# Patient Record
Sex: FEMALE | Race: WHITE | Employment: OTHER | URBAN - NONMETROPOLITAN AREA
[De-identification: names, ages, dates, MRNs, and addresses within clinical notes are randomized per-mention and may not be internally consistent; named-entity substitution may affect disease eponyms.]

---

## 2017-11-07 ENCOUNTER — HOSPITAL ENCOUNTER (OUTPATIENT)
Age: 67
Discharge: HOME | End: 2017-11-07
Payer: MEDICARE

## 2017-11-07 LAB
ALBUMIN SERPL-MCNC: 4.2 G/DL (ref 3.5–5.1)
ALP BLD-CCNC: 64 U/L (ref 38–126)
ALT SERPL-CCNC: 29 U/L (ref 11–66)
ANION GAP SERPL CALCULATED.3IONS-SCNC: 13 MEQ/L (ref 8–16)
AST SERPL-CCNC: 21 U/L (ref 5–40)
BILIRUB SERPL-MCNC: 0.9 MG/DL (ref 0.3–1.2)
BILIRUBIN DIRECT: < 0.2 MG/DL (ref 0–0.3)
BUN BLDV-MCNC: 21 MG/DL (ref 7–22)
CALCIUM SERPL-MCNC: 10.3 MG/DL (ref 8.5–10.5)
CHLORIDE BLD-SCNC: 101 MEQ/L (ref 98–111)
CHOLESTEROL, TOTAL: 203 MG/DL (ref 100–199)
CO2: 28 MEQ/L (ref 23–33)
CREAT SERPL-MCNC: 0.7 MG/DL (ref 0.4–1.2)
GFR SERPL CREATININE-BSD FRML MDRD: 83 ML/MIN/1.73M2
GLUCOSE BLD-MCNC: 119 MG/DL (ref 70–108)
HDLC SERPL-MCNC: 52 MG/DL
LDL CHOLESTEROL CALCULATED: 119 MG/DL
POTASSIUM SERPL-SCNC: 4.5 MEQ/L (ref 3.5–5.2)
SODIUM BLD-SCNC: 142 MEQ/L (ref 135–145)
TOTAL PROTEIN: 7 G/DL (ref 6.1–8)
TRIGL SERPL-MCNC: 159 MG/DL (ref 0–199)
TSH SERPL DL<=0.05 MIU/L-ACNC: 2.21 UIU/ML (ref 0.4–4.2)

## 2017-11-07 PROCEDURE — 80061 LIPID PANEL: CPT

## 2017-11-07 PROCEDURE — 36415 COLL VENOUS BLD VENIPUNCTURE: CPT

## 2017-11-07 PROCEDURE — 82248 BILIRUBIN DIRECT: CPT

## 2017-11-07 PROCEDURE — 80053 COMPREHEN METABOLIC PANEL: CPT

## 2017-11-07 PROCEDURE — 84443 ASSAY THYROID STIM HORMONE: CPT

## 2017-12-01 ENCOUNTER — HOSPITAL ENCOUNTER (OUTPATIENT)
Dept: PULMONOLOGY | Age: 67
Discharge: HOME | End: 2017-12-01
Payer: MEDICARE

## 2017-12-01 ENCOUNTER — HOSPITAL ENCOUNTER (OUTPATIENT)
Dept: GENERAL RADIOLOGY | Age: 67
Discharge: HOME | End: 2017-12-01
Payer: MEDICARE

## 2017-12-01 ENCOUNTER — HOSPITAL ENCOUNTER (OUTPATIENT)
Age: 67
Discharge: HOME | End: 2017-12-01
Payer: COMMERCIAL

## 2017-12-01 DIAGNOSIS — R06.02 BREATH SHORTNESS: ICD-10-CM

## 2017-12-01 PROCEDURE — 71020 XR CHEST STANDARD TWO VW: CPT

## 2017-12-01 PROCEDURE — 94060 EVALUATION OF WHEEZING: CPT

## 2017-12-01 PROCEDURE — 94726 PLETHYSMOGRAPHY LUNG VOLUMES: CPT

## 2017-12-01 PROCEDURE — 94729 DIFFUSING CAPACITY: CPT

## 2017-12-08 ENCOUNTER — HOSPITAL ENCOUNTER (OUTPATIENT)
Dept: INPATIENT UNIT | Age: 67
Setting detail: OBSERVATION
Discharge: HOME | End: 2017-12-09
Attending: INTERNAL MEDICINE | Admitting: INTERNAL MEDICINE
Payer: MEDICARE

## 2017-12-08 PROBLEM — I25.10 CAD IN NATIVE ARTERY: Status: ACTIVE | Noted: 2017-12-08

## 2017-12-08 LAB
ABO: NORMAL
ALBUMIN SERPL-MCNC: 4.3 G/DL (ref 3.5–5.1)
ALP BLD-CCNC: 54 U/L (ref 38–126)
ALT SERPL-CCNC: 26 U/L (ref 11–66)
ANION GAP SERPL CALCULATED.3IONS-SCNC: 14 MEQ/L (ref 8–16)
ANTIBODY SCREEN: NORMAL
AST SERPL-CCNC: 23 U/L (ref 5–40)
BILIRUB SERPL-MCNC: 0.9 MG/DL (ref 0.3–1.2)
BUN BLDV-MCNC: 24 MG/DL (ref 7–22)
CALCIUM SERPL-MCNC: 10.3 MG/DL (ref 8.5–10.5)
CHLORIDE BLD-SCNC: 99 MEQ/L (ref 98–111)
CHOLESTEROL, TOTAL: 193 MG/DL (ref 100–199)
CO2: 28 MEQ/L (ref 23–33)
CREAT SERPL-MCNC: 0.7 MG/DL (ref 0.4–1.2)
EKG ATRIAL RATE: 86 BPM
EKG P AXIS: 53 DEGREES
EKG P-R INTERVAL: 186 MS
EKG Q-T INTERVAL: 372 MS
EKG QRS DURATION: 72 MS
EKG QTC CALCULATION (BAZETT): 445 MS
EKG R AXIS: 25 DEGREES
EKG T AXIS: 42 DEGREES
EKG VENTRICULAR RATE: 86 BPM
GFR SERPL CREATININE-BSD FRML MDRD: 83 ML/MIN/1.73M2
GLUCOSE BLD-MCNC: 105 MG/DL (ref 70–108)
HCT VFR BLD CALC: 45.6 % (ref 37–47)
HDLC SERPL-MCNC: 57 MG/DL
HEMOGLOBIN: 15.6 GM/DL (ref 12–16)
INR BLD: 1.08 (ref 0.85–1.13)
LDL CHOLESTEROL CALCULATED: 102 MG/DL
MCH RBC QN AUTO: 30.9 PG (ref 27–31)
MCHC RBC AUTO-ENTMCNC: 34.3 GM/DL (ref 33–37)
MCV RBC AUTO: 90 FL (ref 81–99)
PDW BLD-RTO: 13.5 % (ref 11.5–14.5)
PLATELET # BLD: 231 THOU/MM3 (ref 130–400)
PMV BLD AUTO: 8.3 MCM (ref 7.4–10.4)
POTASSIUM SERPL-SCNC: 4 MEQ/L (ref 3.5–5.2)
RBC # BLD: 5.06 MILL/MM3 (ref 4.2–5.4)
RH FACTOR: NORMAL
SODIUM BLD-SCNC: 141 MEQ/L (ref 135–145)
TOTAL PROTEIN: 7.2 G/DL (ref 6.1–8)
TRIGL SERPL-MCNC: 171 MG/DL (ref 0–199)
WBC # BLD: 6.3 THOU/MM3 (ref 4.8–10.8)

## 2017-12-08 PROCEDURE — 93005 ELECTROCARDIOGRAM TRACING: CPT

## 2017-12-08 PROCEDURE — 36415 COLL VENOUS BLD VENIPUNCTURE: CPT

## 2017-12-08 PROCEDURE — 6370000000 HC RX 637 (ALT 250 FOR IP): Performed by: INTERNAL MEDICINE

## 2017-12-08 PROCEDURE — G0378 HOSPITAL OBSERVATION PER HR: HCPCS

## 2017-12-08 PROCEDURE — 85027 COMPLETE CBC AUTOMATED: CPT

## 2017-12-08 PROCEDURE — 86901 BLOOD TYPING SEROLOGIC RH(D): CPT

## 2017-12-08 PROCEDURE — 85610 PROTHROMBIN TIME: CPT

## 2017-12-08 PROCEDURE — 80053 COMPREHEN METABOLIC PANEL: CPT

## 2017-12-08 PROCEDURE — 2580000003 HC RX 258: Performed by: INTERNAL MEDICINE

## 2017-12-08 PROCEDURE — 86850 RBC ANTIBODY SCREEN: CPT

## 2017-12-08 PROCEDURE — 86900 BLOOD TYPING SEROLOGIC ABO: CPT

## 2017-12-08 PROCEDURE — 80061 LIPID PANEL: CPT

## 2017-12-08 RX ORDER — PRAVASTATIN SODIUM 80 MG/1
80 TABLET ORAL NIGHTLY
Status: DISCONTINUED | OUTPATIENT
Start: 2017-12-08 | End: 2017-12-09 | Stop reason: HOSPADM

## 2017-12-08 RX ORDER — MELOXICAM 7.5 MG/1
15 TABLET ORAL DAILY
Status: DISCONTINUED | OUTPATIENT
Start: 2017-12-09 | End: 2017-12-09 | Stop reason: HOSPADM

## 2017-12-08 RX ORDER — HYDROCHLOROTHIAZIDE 25 MG/1
25 TABLET ORAL DAILY
COMMUNITY
End: 2022-06-16 | Stop reason: ALTCHOICE

## 2017-12-08 RX ORDER — RAMIPRIL 10 MG/1
10 CAPSULE ORAL DAILY
Status: DISCONTINUED | OUTPATIENT
Start: 2017-12-09 | End: 2017-12-09 | Stop reason: HOSPADM

## 2017-12-08 RX ORDER — SODIUM CHLORIDE 9 MG/ML
INJECTION, SOLUTION INTRAVENOUS CONTINUOUS
Status: DISCONTINUED | OUTPATIENT
Start: 2017-12-08 | End: 2017-12-08 | Stop reason: SDUPTHER

## 2017-12-08 RX ORDER — ALENDRONATE SODIUM 35 MG/1
35 TABLET ORAL
Status: DISCONTINUED | OUTPATIENT
Start: 2017-12-08 | End: 2017-12-08 | Stop reason: SDUPTHER

## 2017-12-08 RX ORDER — ASPIRIN 325 MG
325 TABLET ORAL ONCE
Status: DISCONTINUED | OUTPATIENT
Start: 2017-12-08 | End: 2017-12-09 | Stop reason: HOSPADM

## 2017-12-08 RX ORDER — SODIUM CHLORIDE 0.9 % (FLUSH) 0.9 %
10 SYRINGE (ML) INJECTION PRN
Status: DISCONTINUED | OUTPATIENT
Start: 2017-12-08 | End: 2017-12-09

## 2017-12-08 RX ORDER — ASPIRIN 81 MG/1
81 TABLET ORAL DAILY
Status: DISCONTINUED | OUTPATIENT
Start: 2017-12-09 | End: 2017-12-09 | Stop reason: HOSPADM

## 2017-12-08 RX ORDER — METOPROLOL TARTRATE 50 MG/1
50 TABLET, FILM COATED ORAL 2 TIMES DAILY
Status: DISCONTINUED | OUTPATIENT
Start: 2017-12-08 | End: 2017-12-09 | Stop reason: HOSPADM

## 2017-12-08 RX ORDER — SODIUM CHLORIDE 9 MG/ML
INJECTION, SOLUTION INTRAVENOUS CONTINUOUS
Status: DISCONTINUED | OUTPATIENT
Start: 2017-12-09 | End: 2017-12-09

## 2017-12-08 RX ORDER — MELOXICAM 15 MG/1
15 TABLET ORAL DAILY
COMMUNITY
End: 2018-04-05

## 2017-12-08 RX ORDER — HYDROCHLOROTHIAZIDE 25 MG/1
25 TABLET ORAL DAILY
Status: DISCONTINUED | OUTPATIENT
Start: 2017-12-09 | End: 2017-12-09 | Stop reason: HOSPADM

## 2017-12-08 RX ORDER — ASPIRIN 81 MG/1
81 TABLET ORAL DAILY
COMMUNITY
End: 2018-04-05

## 2017-12-08 RX ORDER — METOPROLOL TARTRATE 50 MG/1
50 TABLET, FILM COATED ORAL 2 TIMES DAILY
COMMUNITY

## 2017-12-08 RX ADMIN — PRAVASTATIN SODIUM 80 MG: 80 TABLET ORAL at 23:47

## 2017-12-08 RX ADMIN — METOPROLOL TARTRATE 50 MG: 50 TABLET, FILM COATED ORAL at 23:47

## 2017-12-08 RX ADMIN — SODIUM CHLORIDE: 9 INJECTION, SOLUTION INTRAVENOUS at 15:08

## 2017-12-08 NOTE — FLOWSHEET NOTE
Pt admitted to  [address] [place] [address] for cardiac cath. Pt NPO. Vital signs obtained. Assessment and data collection initiated. Oriented to room. Policies and procedures for 2E explained   All questions answered with no further questions at this time. Fall prevention and safety precautions discussed with patient.

## 2017-12-09 ENCOUNTER — APPOINTMENT (OUTPATIENT)
Dept: CARDIAC CATH/INVASIVE PROCEDURES | Age: 67
End: 2017-12-09
Attending: INTERNAL MEDICINE
Payer: MEDICARE

## 2017-12-09 VITALS
RESPIRATION RATE: 18 BRPM | HEART RATE: 62 BPM | OXYGEN SATURATION: 92 % | TEMPERATURE: 97.6 F | WEIGHT: 275 LBS | HEIGHT: 65 IN | BODY MASS INDEX: 45.82 KG/M2 | DIASTOLIC BLOOD PRESSURE: 73 MMHG | SYSTOLIC BLOOD PRESSURE: 132 MMHG

## 2017-12-09 PROCEDURE — 2580000003 HC RX 258: Performed by: INTERNAL MEDICINE

## 2017-12-09 PROCEDURE — 2500000003 HC RX 250 WO HCPCS

## 2017-12-09 PROCEDURE — A6258 TRANSPARENT FILM >16<=48 IN: HCPCS

## 2017-12-09 PROCEDURE — 93458 L HRT ARTERY/VENTRICLE ANGIO: CPT | Performed by: INTERNAL MEDICINE

## 2017-12-09 PROCEDURE — C1894 INTRO/SHEATH, NON-LASER: HCPCS

## 2017-12-09 PROCEDURE — G0378 HOSPITAL OBSERVATION PER HR: HCPCS

## 2017-12-09 PROCEDURE — C1769 GUIDE WIRE: HCPCS

## 2017-12-09 PROCEDURE — 6370000000 HC RX 637 (ALT 250 FOR IP): Performed by: INTERNAL MEDICINE

## 2017-12-09 PROCEDURE — 2780000010 HC IMPLANT OTHER

## 2017-12-09 PROCEDURE — 6360000002 HC RX W HCPCS

## 2017-12-09 RX ORDER — ACETAMINOPHEN 325 MG/1
650 TABLET ORAL EVERY 4 HOURS PRN
Status: DISCONTINUED | OUTPATIENT
Start: 2017-12-09 | End: 2017-12-09 | Stop reason: HOSPADM

## 2017-12-09 RX ORDER — SODIUM CHLORIDE 0.9 % (FLUSH) 0.9 %
10 SYRINGE (ML) INJECTION PRN
Status: DISCONTINUED | OUTPATIENT
Start: 2017-12-09 | End: 2017-12-09 | Stop reason: HOSPADM

## 2017-12-09 RX ORDER — ATROPINE SULFATE 0.4 MG/ML
0.5 AMPUL (ML) INJECTION
Status: DISCONTINUED | OUTPATIENT
Start: 2017-12-09 | End: 2017-12-09 | Stop reason: HOSPADM

## 2017-12-09 RX ORDER — ONDANSETRON 2 MG/ML
4 INJECTION INTRAMUSCULAR; INTRAVENOUS EVERY 6 HOURS PRN
Status: DISCONTINUED | OUTPATIENT
Start: 2017-12-09 | End: 2017-12-09 | Stop reason: HOSPADM

## 2017-12-09 RX ORDER — SODIUM CHLORIDE 9 MG/ML
100 INJECTION, SOLUTION INTRAVENOUS CONTINUOUS
Status: DISCONTINUED | OUTPATIENT
Start: 2017-12-09 | End: 2017-12-09 | Stop reason: HOSPADM

## 2017-12-09 RX ORDER — SODIUM CHLORIDE 0.9 % (FLUSH) 0.9 %
10 SYRINGE (ML) INJECTION EVERY 12 HOURS SCHEDULED
Status: DISCONTINUED | OUTPATIENT
Start: 2017-12-09 | End: 2017-12-09 | Stop reason: HOSPADM

## 2017-12-09 RX ADMIN — RAMIPRIL 10 MG: 10 CAPSULE ORAL at 09:25

## 2017-12-09 RX ADMIN — METOPROLOL TARTRATE 50 MG: 50 TABLET, FILM COATED ORAL at 09:25

## 2017-12-09 RX ADMIN — HYDROCHLOROTHIAZIDE 25 MG: 25 TABLET ORAL at 09:24

## 2017-12-09 RX ADMIN — SODIUM CHLORIDE: 9 INJECTION, SOLUTION INTRAVENOUS at 02:00

## 2017-12-09 RX ADMIN — MELOXICAM 15 MG: 7.5 TABLET ORAL at 09:24

## 2017-12-09 RX ADMIN — ASPIRIN 81 MG: 81 TABLET, COATED ORAL at 07:42

## 2017-12-09 ASSESSMENT — PAIN SCALES - GENERAL: PAINLEVEL_OUTOF10: 0

## 2017-12-09 NOTE — H&P
[facility]   Sedation/Analgesia History and Physical    Pt Name: Jennifer Mi  MRN: [id number]  YOB: 1950  Provider Performing Procedure: Robbie Kelly MD  Primary Care Physician: Morena Arroyo MD      Pre-Procedure: Chest pain, possible coronary artery disease, abnormal stress test    Consent: I have discussed with the patient and/or the patient representative the indication, alternatives, and the possible risks and/or complications of the planned procedure and the anesthesia methods. The patient and/or representative appear to understand and agree to proceed. Medical History:   has a past medical history of Arthritis; History of kidney stones; Hyperlipidemia; and Hypertension. .    Surgical History:     has a past surgical history that includes Colonoscopy (1-2012); Dilation and curettage of uterus (1996); Lithotripsy (3-9-11); Foot surgery (Left, 2011); Cystoscopy (2011); and Throat surgery (1-2014). Allergies: Allergies as of 12/08/2017 - Review Complete 12/08/2017   Allergen Reaction Noted    Dilaudid [hydromorphone hcl] Nausea And Vomiting 08/24/2011    Toradol [ketorolac tromethamine] Other (See Comments) 08/14/2015       Medications:   Coumadin use last 5 days:  No  Antiplatelet drug therapy use last 5 days:  Yes  Other anticoagulant use last 5 days:  No   aspirin  325 mg Oral Once    aspirin  81 mg Oral Daily    hydrochlorothiazide  25 mg Oral Daily    meloxicam  15 mg Oral Daily    metoprolol tartrate  50 mg Oral BID    ramipril  10 mg Oral Daily    pravastatin  80 mg Oral Nightly     Prior to Admission medications    Medication Sig Start Date End Date Taking?  Authorizing Provider   hydrochlorothiazide (HYDRODIURIL) 25 MG tablet Take 25 mg by mouth daily   Yes Historical Provider, MD   aspirin 81 MG EC tablet Take 81 mg by mouth daily   Yes Historical Provider, MD   metoprolol tartrate (LOPRESSOR) 50 MG tablet Take 50 mg by mouth 2 times daily   Yes

## 2017-12-09 NOTE — FLOWSHEET NOTE
Pt and visitors updated on cancelling of heart cath for today. Pt aggreeable to spend the night and have procedure done in am. Pt taking food and fluids.

## 2017-12-09 NOTE — PROGRESS NOTES
Patient arrived per cart to 3B. Heart monitor applied and vitals taken. Admission paperwork completed. Explained to patient that [facility] is not responsible for any lost or stolen items. Patient verbalized understanding. Oriented to room and use of call light and bed controls. Patient denies pain or needs. No signs of distress noted. Bed locked & in low position, side-rails up x2. Call light in reach. Reminded patient to call nurse if any needs arise.

## 2017-12-09 NOTE — PROGRESS NOTES
PHARMACY NOTE  Dara Sanz was ordered Fosamax. Per the [address], this medication is non-formulary and not stocked by pharmacy. The medication can be reordered at discharge.     Roe Bills, [facility] 12/8/2017 11:13 PM

## 2017-12-09 NOTE — PROGRESS NOTES
Patient and daughter given discharge instructions via teach back method. Patient verbalizes understanding of care of her cath site. Patient taken to the discharge lobby in stable condition.

## 2017-12-09 NOTE — PROCEDURES
[address] [place] [address]                              CARDIAC CATHETERIZATION    PATIENT NAME: Swapna Gerardo                    :        1950  MED REC NO:   [id number]                           ROOM:       7654  ACCOUNT NO:   [de-identified]                           ADMIT DATE: 2017  PROVIDER:     Jarrell Wells. STEPHANIE Kiran [place]:  2017    INDICATIONS:  This is a patient who is a 69-year-old patient with a history  of chest pain and shortness of breath. The patient is morbidly obese,  hypertension, hypercholesteremia, possible sleep apnea, dyspnea on  exertion. She is having arthritis. She has a history of chest pain. She  did have a stress Cardiolite test, was positive. She was treated with  beta-blockers and nitroglycerin. She continued to have the chest pain,  admitted for a heart cath, possible intervention. The patient understands  the procedure, the benefits, the risks, the alternative methods of  treatment, possible complications, and she wants it to be done. PROCEDURES PERFORMED:  1.  IV conscious sedation. The patient was given IV conscious sedation in  increment dosages by the circulating cath lab RN, monitored by the cath lab  monitor tech under my supervision. Hemodynamically, oxygen saturations  were all monitored and were normal.    This patient has no acute complication from the procedure. Procedure  started at 08:10 and finished at 08:20 a.m. No acute complications. 2.  Left heart cath. The right radial artery was cannulated in the usual  fashion utilizing radial sheath. The patient did receive the  nitroglycerin, verapamil, and heparin through the sheath per protocol. The coronary angiogram showed the RCA is a dominant artery. RCA has mild  atheromatous, diffuse, nonobstructive disease about 20% to 30% in its mid  course. Distally, it was patent.     The left main was

## 2017-12-28 ENCOUNTER — HOSPITAL ENCOUNTER (OUTPATIENT)
Dept: WOMENS IMAGING | Age: 67
Discharge: HOME | End: 2017-12-28
Payer: MEDICARE

## 2017-12-28 DIAGNOSIS — Z13.9 VISIT FOR SCREENING: ICD-10-CM

## 2017-12-28 PROCEDURE — 77063 BREAST TOMOSYNTHESIS BI: CPT

## 2018-01-16 NOTE — DISCHARGE SUMMARY
[address] [place] [address]                                 DISCHARGE SUMMARY    PATIENT NAME: Kamran Kenny                    :        1950  MED REC NO:   [id number]                           ROOM:       1016  ACCOUNT NO:   [de-identified]                           ADMIT DATE: 2017  PROVIDER:     Kassandra Stanley. Timoteo Villanueva M.D.               [place]: 2017    FINAL DIAGNOSES:  1. Atypical chest pain. 2.  Hypertensive cardiovascular disease. 3.  Dyslipidemia. 4.  Hypertension. 5.  Morbid obesity. 6.  Abnormal stress test.    PROCEDURE PERFORMED DURING THIS HOSPITALIZATION:  Left heart cath, left  ventriculogram, selective coronary angiogram.    HOSPITAL COURSE:  This is a patient who is a 50-year-old lady, admitted  initially for a heart cath; however, there was some delay in the cath lab  procedures. On emergency case, the procedure had to be postponed. The  next day, the patient underwent the procedure. She had nonobstructive  coronary artery disease. She tolerated the procedure. Continue home  medications. Discharged in a stable condition to be seen periodically. Seek medical attention if she has any change in clinical condition.         Xochitl Saldana M.D.    D: 01/15/2018 4:39:54       T: 01/15/2018 9:17:25     [email or web address]  Job#: [id number]     Doc#: [id number]    CC:

## 2018-02-05 ENCOUNTER — OFFICE VISIT (OUTPATIENT)
Dept: BARIATRICS/WEIGHT MGMT | Age: 68
End: 2018-02-05
Payer: MEDICARE

## 2018-02-05 VITALS
HEART RATE: 72 BPM | BODY MASS INDEX: 45.9 KG/M2 | SYSTOLIC BLOOD PRESSURE: 124 MMHG | DIASTOLIC BLOOD PRESSURE: 84 MMHG | HEIGHT: 65 IN | RESPIRATION RATE: 18 BRPM | TEMPERATURE: 97.4 F | WEIGHT: 275.5 LBS

## 2018-02-05 DIAGNOSIS — E66.01 MORBID OBESITY WITH BMI OF 45.0-49.9, ADULT (HCC): Primary | ICD-10-CM

## 2018-02-05 DIAGNOSIS — E78.5 HYPERLIPIDEMIA, UNSPECIFIED HYPERLIPIDEMIA TYPE: ICD-10-CM

## 2018-02-05 DIAGNOSIS — I10 ESSENTIAL HYPERTENSION: ICD-10-CM

## 2018-02-05 DIAGNOSIS — Z91.89 AT RISK FOR SLEEP APNEA: ICD-10-CM

## 2018-02-05 PROCEDURE — G8400 PT W/DXA NO RESULTS DOC: HCPCS | Performed by: PHYSICIAN ASSISTANT

## 2018-02-05 PROCEDURE — 3014F SCREEN MAMMO DOC REV: CPT | Performed by: PHYSICIAN ASSISTANT

## 2018-02-05 PROCEDURE — G8417 CALC BMI ABV UP PARAM F/U: HCPCS | Performed by: PHYSICIAN ASSISTANT

## 2018-02-05 PROCEDURE — 1090F PRES/ABSN URINE INCON ASSESS: CPT | Performed by: PHYSICIAN ASSISTANT

## 2018-02-05 PROCEDURE — G8427 DOCREV CUR MEDS BY ELIG CLIN: HCPCS | Performed by: PHYSICIAN ASSISTANT

## 2018-02-05 PROCEDURE — 1036F TOBACCO NON-USER: CPT | Performed by: PHYSICIAN ASSISTANT

## 2018-02-05 PROCEDURE — 1123F ACP DISCUSS/DSCN MKR DOCD: CPT | Performed by: PHYSICIAN ASSISTANT

## 2018-02-05 PROCEDURE — G8484 FLU IMMUNIZE NO ADMIN: HCPCS | Performed by: PHYSICIAN ASSISTANT

## 2018-02-05 PROCEDURE — 4040F PNEUMOC VAC/ADMIN/RCVD: CPT | Performed by: PHYSICIAN ASSISTANT

## 2018-02-05 PROCEDURE — 3017F COLORECTAL CA SCREEN DOC REV: CPT | Performed by: PHYSICIAN ASSISTANT

## 2018-02-05 PROCEDURE — G8598 ASA/ANTIPLAT THER USED: HCPCS | Performed by: PHYSICIAN ASSISTANT

## 2018-02-05 PROCEDURE — 99204 OFFICE O/P NEW MOD 45 MIN: CPT | Performed by: PHYSICIAN ASSISTANT

## 2018-02-05 NOTE — PROGRESS NOTES
my energy and to allow me to live a more active and social lifestyle      Scheduled for Sleep study on February 20th, 2018. Weight Related Comorbid Conditions:   Significant weight related diseases affecting this patient are Hypertension, Hyperlipidemia, at risk for JENNY, Osteoarthritis, nephrolithiasis and anxiety     Allergies: Allergies   Allergen Reactions    Dilaudid [Hydromorphone Hcl] Nausea And Vomiting    Toradol [Ketorolac Tromethamine] Other (See Comments)     Lethargic, felt \"weird\"       Past Medical History:     Past Medical History:   Diagnosis Date    Anxiety     Arthritis     History of kidney stones     Hyperlipidemia     Hypertension     Osteoarthritis     Sleep apnea    . Past Surgical History:  Past Surgical History:   Procedure Laterality Date    CARDIAC CATHETERIZATION      COLONOSCOPY  1-2012    CYSTOSCOPY  2011    kidney stone retrieval    DILATION AND CURETTAGE OF UTERUS  1996    FOOT SURGERY Left 2011    realigned toes, and fused ankle    LITHOTRIPSY  3-9-11    THROAT SURGERY  1-2014    Stretched throat       Family History:  Family History   Problem Relation Age of Onset    Heart Attack Mother     Depression Mother     Obesity Mother     Arthritis Mother     Cancer Mother     Heart Disease Father     High Blood Pressure Father     High Cholesterol Father     Obesity Father     Depression Sister     Obesity Sister     Depression Brother     Obesity Brother        Social History:  Social History     Social History    Marital status:      Spouse name: N/A    Number of children: N/A    Years of education: N/A     Occupational History    Not on file.      Social History Main Topics    Smoking status: Never Smoker    Smokeless tobacco: Never Used    Alcohol use No    Drug use: No    Sexual activity: Not on file     Other Topics Concern    Not on file     Social History Narrative    No narrative on file       Current Medications:  Outpatient Breath sounds clear and equal bilaterally. Prescriptions Marked as Taking for the 2/5/18 encounter (Office Visit) with KAMI Martinez   Medication Sig Dispense Refill    hydrochlorothiazide (HYDRODIURIL) 25 MG tablet Take 25 mg by mouth daily      aspirin 81 MG EC tablet Take 81 mg by mouth daily      metoprolol tartrate (LOPRESSOR) 50 MG tablet Take 50 mg by mouth 2 times daily      meloxicam (MOBIC) 15 MG tablet Take 15 mg by mouth daily      alendronate (FOSAMAX) 35 MG tablet Take 35 mg by mouth every 7 days      NONFORMULARY Take 1 tablet by mouth daily Indications: [employer] Restore      RAMIPRIL PO Take 10 mg by mouth daily       PRAVASTATIN SODIUM PO Take 80 mg by mouth nightly       MULTIPLE VITAMIN PO Take  by mouth. 24 hour diet recall and physical activity reviewed. Review of Systems:   Constitutional: Denies any fever, chills (+)fatigue. Wound: Denies any rash, skin color changes or wound problems. Resp: Denies any cough, (+) shortness of breath with exertion. CV: Denies any chest pain, orthopnea or syncope. Endocrine: Denies heat intolerance, cold intolerance, polydipsia, polyuria  MS: (+) myalgias,(+) arthralgias  GI: Denies any nausea, vomiting, diarrhea, constipation. (+) reflux  : Denies any hematuria, hesitancy or dysuria. Neuro: Denies dizziness, syncope, headaches. Objective:    /84 (Site: Left Arm, Position: Sitting, Cuff Size: Large Adult)   Pulse 72   Temp 97.4 °F (36.3 °C) (Oral)   Resp 18   Ht 5' 5\" (1.651 m)   Wt 275 lb 8 oz (125 kg)   BMI 45.85 kg/m²   Body mass index is 45.85 kg/m². Physical Exam:  CONSTITUTIONAL: alert, cooperative, no apparent distress. Alert and oriented x 3. SKIN:  No visible rashes or lesions. HEENT: Head is normocephalic, atraumatic. EOMI b/l  NECK: Supple  CHEST/LUNGS: respirations unlabored. CARDIOVASCULAR:Regular rate and rhythm. NEUROLOGIC: There are no focalizing motor or sensory deficits. CN II-XII are grossly intact. Breeding Rolling    EXTREMITIES: no

## 2018-02-15 ENCOUNTER — OFFICE VISIT (OUTPATIENT)
Dept: BARIATRICS/WEIGHT MGMT | Age: 68
End: 2018-02-15

## 2018-02-15 DIAGNOSIS — E66.01 MORBID OBESITY WITH BMI OF 45.0-49.9, ADULT (HCC): Primary | ICD-10-CM

## 2018-02-15 NOTE — PROGRESS NOTES
Patient attended week two of Rev It Up class. Weekly content was reviewed in class setting. The following challenges were set in class:  1. Foundation Challenge: Eat breakfast within 1- 1.5 hours of getting out of bed and do not go longer than 4 hours between each meal or snack. 2. Food Challenge: Eat 3-4 fuel groups at meals and 1-2 fuels groups at snacks, using portion guidelines. 3. Fluid Challenge: Drink EXTRA fluid for exercise along with the 64 oz currently drinking. 4. Fitness Challenge: Check your heart rate once before, during and after exercise on at least 2 of the 4 days of exercise.

## 2018-03-01 ENCOUNTER — TELEPHONE (OUTPATIENT)
Dept: BARIATRICS/WEIGHT MGMT | Age: 68
End: 2018-03-01

## 2018-03-01 NOTE — TELEPHONE ENCOUNTER
Pt called office stating unable to come to Week 4 Rev It Up Class this evening after throwing back out and immobility. Pt given instructions to read pages 119-146 in book. Updated weekly class scheduled relayed to patient for the next four weeks over phone. Pt voiced understanding.

## 2018-04-05 ENCOUNTER — HOSPITAL ENCOUNTER (INPATIENT)
Age: 68
LOS: 4 days | Discharge: NURSING, REHAB OR LONG-TERM CARE | DRG: 193 | End: 2018-04-09
Attending: EMERGENCY MEDICINE | Admitting: INTERNAL MEDICINE
Payer: MEDICARE

## 2018-04-05 ENCOUNTER — APPOINTMENT (OUTPATIENT)
Dept: GENERAL RADIOLOGY | Age: 68
DRG: 193 | End: 2018-04-05
Payer: MEDICARE

## 2018-04-05 ENCOUNTER — APPOINTMENT (OUTPATIENT)
Dept: MRI IMAGING | Age: 68
DRG: 193 | End: 2018-04-05
Payer: MEDICARE

## 2018-04-05 ENCOUNTER — APPOINTMENT (OUTPATIENT)
Dept: CT IMAGING | Age: 68
DRG: 193 | End: 2018-04-05
Payer: MEDICARE

## 2018-04-05 DIAGNOSIS — R09.02 HYPOXIA: ICD-10-CM

## 2018-04-05 DIAGNOSIS — S39.012A LUMBOSACRAL STRAIN, INITIAL ENCOUNTER: Primary | ICD-10-CM

## 2018-04-05 LAB
ALLEN TEST: POSITIVE
ANION GAP SERPL CALCULATED.3IONS-SCNC: 9 MEQ/L (ref 8–16)
BASE EXCESS (CALCULATED): 4 MMOL/L (ref -2.5–2.5)
BASOPHILS # BLD: 0.3 %
BASOPHILS ABSOLUTE: 0 THOU/MM3 (ref 0–0.1)
BUN BLDV-MCNC: 14 MG/DL (ref 7–22)
CALCIUM SERPL-MCNC: 9.7 MG/DL (ref 8.5–10.5)
CHLORIDE BLD-SCNC: 97 MEQ/L (ref 98–111)
CO2: 29 MEQ/L (ref 23–33)
COLLECTED BY:: ABNORMAL
CREAT SERPL-MCNC: 0.5 MG/DL (ref 0.4–1.2)
DEVICE: ABNORMAL
EOSINOPHIL # BLD: 0.2 %
EOSINOPHILS ABSOLUTE: 0 THOU/MM3 (ref 0–0.4)
GFR SERPL CREATININE-BSD FRML MDRD: > 90 ML/MIN/1.73M2
GLUCOSE BLD-MCNC: 141 MG/DL (ref 70–108)
HCO3: 31 MMOL/L (ref 23–28)
HCT VFR BLD CALC: 35.1 % (ref 37–47)
HEMOGLOBIN: 12 GM/DL (ref 12–16)
IFIO2: 3
LACTIC ACID: 0.8 MMOL/L (ref 0.5–2.2)
LYMPHOCYTES # BLD: 11.7 %
LYMPHOCYTES ABSOLUTE: 0.7 THOU/MM3 (ref 1–4.8)
MCH RBC QN AUTO: 31.4 PG (ref 27–31)
MCHC RBC AUTO-ENTMCNC: 34.1 GM/DL (ref 33–37)
MCV RBC AUTO: 92.2 FL (ref 81–99)
MONOCYTES # BLD: 11.6 %
MONOCYTES ABSOLUTE: 0.7 THOU/MM3 (ref 0.4–1.3)
NUCLEATED RED BLOOD CELLS: 0 /100 WBC
O2 SATURATION: 93 %
OSMOLALITY CALCULATION: 272.9 MOSMOL/KG (ref 275–300)
PCO2: 55 MMHG (ref 35–45)
PDW BLD-RTO: 13.9 % (ref 11.5–14.5)
PH BLOOD GAS: 7.36 (ref 7.35–7.45)
PLATELET # BLD: 161 THOU/MM3 (ref 130–400)
PMV BLD AUTO: 8 FL (ref 7.4–10.4)
PO2: 71 MMHG (ref 71–104)
POTASSIUM SERPL-SCNC: 4.1 MEQ/L (ref 3.5–5.2)
PROCALCITONIN: 0.07 NG/ML (ref 0.01–0.09)
RBC # BLD: 3.81 MILL/MM3 (ref 4.2–5.4)
SEG NEUTROPHILS: 76.2 %
SEGMENTED NEUTROPHILS ABSOLUTE COUNT: 4.5 THOU/MM3 (ref 1.8–7.7)
SODIUM BLD-SCNC: 135 MEQ/L (ref 135–145)
SOURCE, BLOOD GAS: ABNORMAL
WBC # BLD: 5.9 THOU/MM3 (ref 4.8–10.8)

## 2018-04-05 PROCEDURE — 6370000000 HC RX 637 (ALT 250 FOR IP): Performed by: INTERNAL MEDICINE

## 2018-04-05 PROCEDURE — 94640 AIRWAY INHALATION TREATMENT: CPT

## 2018-04-05 PROCEDURE — 96375 TX/PRO/DX INJ NEW DRUG ADDON: CPT

## 2018-04-05 PROCEDURE — 6370000000 HC RX 637 (ALT 250 FOR IP): Performed by: EMERGENCY MEDICINE

## 2018-04-05 PROCEDURE — 96374 THER/PROPH/DIAG INJ IV PUSH: CPT

## 2018-04-05 PROCEDURE — 85025 COMPLETE CBC W/AUTO DIFF WBC: CPT

## 2018-04-05 PROCEDURE — B32T1ZZ COMPUTERIZED TOMOGRAPHY (CT SCAN) OF LEFT PULMONARY ARTERY USING LOW OSMOLAR CONTRAST: ICD-10-PCS | Performed by: RADIOLOGY

## 2018-04-05 PROCEDURE — 6360000002 HC RX W HCPCS: Performed by: INTERNAL MEDICINE

## 2018-04-05 PROCEDURE — B32S1ZZ COMPUTERIZED TOMOGRAPHY (CT SCAN) OF RIGHT PULMONARY ARTERY USING LOW OSMOLAR CONTRAST: ICD-10-PCS | Performed by: RADIOLOGY

## 2018-04-05 PROCEDURE — APPSS60 APP SPLIT SHARED TIME 46-60 MINUTES: Performed by: NURSE PRACTITIONER

## 2018-04-05 PROCEDURE — 72158 MRI LUMBAR SPINE W/O & W/DYE: CPT

## 2018-04-05 PROCEDURE — 2580000003 HC RX 258: Performed by: INTERNAL MEDICINE

## 2018-04-05 PROCEDURE — A9579 GAD-BASE MR CONTRAST NOS,1ML: HCPCS | Performed by: EMERGENCY MEDICINE

## 2018-04-05 PROCEDURE — 71275 CT ANGIOGRAPHY CHEST: CPT

## 2018-04-05 PROCEDURE — 84145 PROCALCITONIN (PCT): CPT

## 2018-04-05 PROCEDURE — 72100 X-RAY EXAM L-S SPINE 2/3 VWS: CPT

## 2018-04-05 PROCEDURE — 36415 COLL VENOUS BLD VENIPUNCTURE: CPT

## 2018-04-05 PROCEDURE — 6360000004 HC RX CONTRAST MEDICATION: Performed by: INTERNAL MEDICINE

## 2018-04-05 PROCEDURE — 1200000000 HC SEMI PRIVATE

## 2018-04-05 PROCEDURE — 83605 ASSAY OF LACTIC ACID: CPT

## 2018-04-05 PROCEDURE — 99285 EMERGENCY DEPT VISIT HI MDM: CPT

## 2018-04-05 PROCEDURE — 99223 1ST HOSP IP/OBS HIGH 75: CPT | Performed by: INTERNAL MEDICINE

## 2018-04-05 PROCEDURE — 82803 BLOOD GASES ANY COMBINATION: CPT

## 2018-04-05 PROCEDURE — 6360000002 HC RX W HCPCS: Performed by: EMERGENCY MEDICINE

## 2018-04-05 PROCEDURE — 6360000002 HC RX W HCPCS

## 2018-04-05 PROCEDURE — 6360000004 HC RX CONTRAST MEDICATION: Performed by: EMERGENCY MEDICINE

## 2018-04-05 PROCEDURE — 36600 WITHDRAWAL OF ARTERIAL BLOOD: CPT

## 2018-04-05 PROCEDURE — 87040 BLOOD CULTURE FOR BACTERIA: CPT

## 2018-04-05 PROCEDURE — 80048 BASIC METABOLIC PNL TOTAL CA: CPT

## 2018-04-05 PROCEDURE — 71045 X-RAY EXAM CHEST 1 VIEW: CPT

## 2018-04-05 RX ORDER — ONDANSETRON 2 MG/ML
INJECTION INTRAMUSCULAR; INTRAVENOUS
Status: COMPLETED
Start: 2018-04-05 | End: 2018-04-05

## 2018-04-05 RX ORDER — DOCUSATE SODIUM 100 MG/1
100 CAPSULE, LIQUID FILLED ORAL DAILY
Status: DISCONTINUED | OUTPATIENT
Start: 2018-04-05 | End: 2018-04-09 | Stop reason: HOSPADM

## 2018-04-05 RX ORDER — GABAPENTIN 300 MG/1
300 CAPSULE ORAL 3 TIMES DAILY
COMMUNITY
End: 2020-07-13 | Stop reason: ALTCHOICE

## 2018-04-05 RX ORDER — IPRATROPIUM BROMIDE AND ALBUTEROL SULFATE 2.5; .5 MG/3ML; MG/3ML
1 SOLUTION RESPIRATORY (INHALATION)
Status: DISCONTINUED | OUTPATIENT
Start: 2018-04-05 | End: 2018-04-05

## 2018-04-05 RX ORDER — SODIUM CHLORIDE 0.9 % (FLUSH) 0.9 %
10 SYRINGE (ML) INJECTION PRN
Status: DISCONTINUED | OUTPATIENT
Start: 2018-04-05 | End: 2018-04-09 | Stop reason: HOSPADM

## 2018-04-05 RX ORDER — MORPHINE SULFATE 4 MG/ML
4 INJECTION, SOLUTION INTRAMUSCULAR; INTRAVENOUS ONCE
Status: COMPLETED | OUTPATIENT
Start: 2018-04-05 | End: 2018-04-05

## 2018-04-05 RX ORDER — ACETAMINOPHEN 325 MG/1
650 TABLET ORAL EVERY 4 HOURS PRN
Status: DISCONTINUED | OUTPATIENT
Start: 2018-04-05 | End: 2018-04-09 | Stop reason: HOSPADM

## 2018-04-05 RX ORDER — OXYCODONE HYDROCHLORIDE AND ACETAMINOPHEN 5; 325 MG/1; MG/1
1 TABLET ORAL EVERY 6 HOURS PRN
COMMUNITY
End: 2020-07-13 | Stop reason: ALTCHOICE

## 2018-04-05 RX ORDER — LEVOFLOXACIN 5 MG/ML
500 INJECTION, SOLUTION INTRAVENOUS EVERY 24 HOURS
Status: DISCONTINUED | OUTPATIENT
Start: 2018-04-06 | End: 2018-04-06

## 2018-04-05 RX ORDER — LEVOFLOXACIN 5 MG/ML
500 INJECTION, SOLUTION INTRAVENOUS ONCE
Status: COMPLETED | OUTPATIENT
Start: 2018-04-05 | End: 2018-04-05

## 2018-04-05 RX ORDER — METOPROLOL TARTRATE 50 MG/1
50 TABLET, FILM COATED ORAL 2 TIMES DAILY
Status: DISCONTINUED | OUTPATIENT
Start: 2018-04-05 | End: 2018-04-09 | Stop reason: HOSPADM

## 2018-04-05 RX ORDER — ONDANSETRON 2 MG/ML
4 INJECTION INTRAMUSCULAR; INTRAVENOUS EVERY 6 HOURS PRN
Status: DISCONTINUED | OUTPATIENT
Start: 2018-04-05 | End: 2018-04-09 | Stop reason: HOSPADM

## 2018-04-05 RX ORDER — PRAVASTATIN SODIUM 10 MG
10 TABLET ORAL NIGHTLY
Status: DISCONTINUED | OUTPATIENT
Start: 2018-04-05 | End: 2018-04-09 | Stop reason: HOSPADM

## 2018-04-05 RX ORDER — OXYCODONE HYDROCHLORIDE AND ACETAMINOPHEN 5; 325 MG/1; MG/1
1 TABLET ORAL EVERY 6 HOURS PRN
Status: DISCONTINUED | OUTPATIENT
Start: 2018-04-05 | End: 2018-04-09 | Stop reason: HOSPADM

## 2018-04-05 RX ORDER — GABAPENTIN 300 MG/1
300 CAPSULE ORAL 3 TIMES DAILY
Status: DISCONTINUED | OUTPATIENT
Start: 2018-04-05 | End: 2018-04-09 | Stop reason: HOSPADM

## 2018-04-05 RX ORDER — POLYETHYLENE GLYCOL 3350 17 G/17G
17 POWDER, FOR SOLUTION ORAL DAILY
Status: DISCONTINUED | OUTPATIENT
Start: 2018-04-05 | End: 2018-04-09 | Stop reason: HOSPADM

## 2018-04-05 RX ORDER — MORPHINE SULFATE 4 MG/ML
INJECTION, SOLUTION INTRAMUSCULAR; INTRAVENOUS
Status: COMPLETED
Start: 2018-04-05 | End: 2018-04-05

## 2018-04-05 RX ORDER — IPRATROPIUM BROMIDE AND ALBUTEROL SULFATE 2.5; .5 MG/3ML; MG/3ML
1 SOLUTION RESPIRATORY (INHALATION) 4 TIMES DAILY
Status: DISCONTINUED | OUTPATIENT
Start: 2018-04-05 | End: 2018-04-09 | Stop reason: HOSPADM

## 2018-04-05 RX ORDER — SODIUM CHLORIDE 0.9 % (FLUSH) 0.9 %
10 SYRINGE (ML) INJECTION EVERY 12 HOURS SCHEDULED
Status: DISCONTINUED | OUTPATIENT
Start: 2018-04-05 | End: 2018-04-09 | Stop reason: HOSPADM

## 2018-04-05 RX ORDER — ONDANSETRON 2 MG/ML
4 INJECTION INTRAMUSCULAR; INTRAVENOUS ONCE
Status: COMPLETED | OUTPATIENT
Start: 2018-04-05 | End: 2018-04-05

## 2018-04-05 RX ORDER — IPRATROPIUM BROMIDE AND ALBUTEROL SULFATE 2.5; .5 MG/3ML; MG/3ML
1 SOLUTION RESPIRATORY (INHALATION) ONCE
Status: COMPLETED | OUTPATIENT
Start: 2018-04-05 | End: 2018-04-05

## 2018-04-05 RX ORDER — HYDROCHLOROTHIAZIDE 25 MG/1
25 TABLET ORAL DAILY
Status: DISCONTINUED | OUTPATIENT
Start: 2018-04-05 | End: 2018-04-09 | Stop reason: HOSPADM

## 2018-04-05 RX ORDER — RAMIPRIL 10 MG/1
10 CAPSULE ORAL DAILY
Status: DISCONTINUED | OUTPATIENT
Start: 2018-04-05 | End: 2018-04-09 | Stop reason: HOSPADM

## 2018-04-05 RX ORDER — LEVOFLOXACIN 5 MG/ML
500 INJECTION, SOLUTION INTRAVENOUS EVERY 24 HOURS
Status: DISCONTINUED | OUTPATIENT
Start: 2018-04-05 | End: 2018-04-05

## 2018-04-05 RX ADMIN — RAMIPRIL 10 MG: 10 CAPSULE ORAL at 20:22

## 2018-04-05 RX ADMIN — GABAPENTIN 300 MG: 300 CAPSULE ORAL at 20:19

## 2018-04-05 RX ADMIN — PRAVASTATIN SODIUM 10 MG: 10 TABLET ORAL at 20:19

## 2018-04-05 RX ADMIN — ONDANSETRON 4 MG: 2 INJECTION INTRAMUSCULAR; INTRAVENOUS at 14:35

## 2018-04-05 RX ADMIN — POLYETHYLENE GLYCOL 3350 17 G: 17 POWDER, FOR SOLUTION ORAL at 20:18

## 2018-04-05 RX ADMIN — MORPHINE SULFATE 4 MG: 4 INJECTION, SOLUTION INTRAMUSCULAR; INTRAVENOUS at 14:27

## 2018-04-05 RX ADMIN — LEVOFLOXACIN 500 MG: 5 INJECTION, SOLUTION INTRAVENOUS at 14:36

## 2018-04-05 RX ADMIN — METOPROLOL TARTRATE 50 MG: 50 TABLET, FILM COATED ORAL at 20:19

## 2018-04-05 RX ADMIN — IOPAMIDOL 80 ML: 755 INJECTION, SOLUTION INTRAVENOUS at 18:43

## 2018-04-05 RX ADMIN — GADOTERIDOL 20 ML: 279.3 INJECTION, SOLUTION INTRAVENOUS at 10:41

## 2018-04-05 RX ADMIN — Medication 10 ML: at 20:19

## 2018-04-05 RX ADMIN — IPRATROPIUM BROMIDE AND ALBUTEROL SULFATE 1 AMPULE: .5; 3 SOLUTION RESPIRATORY (INHALATION) at 12:41

## 2018-04-05 RX ADMIN — ONDANSETRON 4 MG: 2 INJECTION INTRAMUSCULAR; INTRAVENOUS at 18:57

## 2018-04-05 RX ADMIN — HYDROMORPHONE HYDROCHLORIDE 1 MG: 1 INJECTION, SOLUTION INTRAMUSCULAR; INTRAVENOUS; SUBCUTANEOUS at 07:43

## 2018-04-05 RX ADMIN — Medication 10 ML: at 18:57

## 2018-04-05 RX ADMIN — ENOXAPARIN SODIUM 40 MG: 40 INJECTION SUBCUTANEOUS at 20:19

## 2018-04-05 RX ADMIN — DOCUSATE SODIUM 100 MG: 100 CAPSULE ORAL at 20:19

## 2018-04-05 RX ADMIN — ONDANSETRON 4 MG: 2 INJECTION INTRAMUSCULAR; INTRAVENOUS at 07:44

## 2018-04-05 RX ADMIN — HYDROCHLOROTHIAZIDE 25 MG: 25 TABLET ORAL at 20:18

## 2018-04-05 ASSESSMENT — PAIN DESCRIPTION - ONSET
ONSET: ON-GOING
ONSET: ON-GOING

## 2018-04-05 ASSESSMENT — PAIN SCALES - GENERAL
PAINLEVEL_OUTOF10: 5
PAINLEVEL_OUTOF10: 8
PAINLEVEL_OUTOF10: 0
PAINLEVEL_OUTOF10: 4
PAINLEVEL_OUTOF10: 4

## 2018-04-05 ASSESSMENT — PAIN DESCRIPTION - LOCATION
LOCATION: BACK;LEG
LOCATION: BACK;LEG

## 2018-04-05 ASSESSMENT — PAIN DESCRIPTION - ORIENTATION
ORIENTATION: LOWER;RIGHT
ORIENTATION: LOWER;RIGHT

## 2018-04-05 ASSESSMENT — PAIN DESCRIPTION - DESCRIPTORS
DESCRIPTORS: SHARP;SHOOTING
DESCRIPTORS: SPASM

## 2018-04-05 ASSESSMENT — PAIN DESCRIPTION - FREQUENCY
FREQUENCY: CONTINUOUS
FREQUENCY: INTERMITTENT

## 2018-04-05 ASSESSMENT — PAIN DESCRIPTION - PAIN TYPE
TYPE: ACUTE PAIN
TYPE: ACUTE PAIN

## 2018-04-06 LAB
ALLEN TEST: POSITIVE
ANION GAP SERPL CALCULATED.3IONS-SCNC: 9 MEQ/L (ref 8–16)
BASE EXCESS (CALCULATED): 4.7 MMOL/L (ref -2.5–2.5)
BASOPHILS # BLD: 0.4 %
BASOPHILS ABSOLUTE: 0 THOU/MM3 (ref 0–0.1)
BILIRUBIN URINE: NEGATIVE
BLOOD, URINE: NEGATIVE
BUN BLDV-MCNC: 13 MG/DL (ref 7–22)
CALCIUM SERPL-MCNC: 10 MG/DL (ref 8.5–10.5)
CHARACTER, URINE: CLEAR
CHLORIDE BLD-SCNC: 99 MEQ/L (ref 98–111)
CO2: 27 MEQ/L (ref 23–33)
COLLECTED BY:: ABNORMAL
COLOR: YELLOW
CREAT SERPL-MCNC: 0.4 MG/DL (ref 0.4–1.2)
DEVICE: ABNORMAL
EOSINOPHIL # BLD: 3.9 %
EOSINOPHILS ABSOLUTE: 0.2 THOU/MM3 (ref 0–0.4)
GFR SERPL CREATININE-BSD FRML MDRD: > 90 ML/MIN/1.73M2
GLUCOSE BLD-MCNC: 119 MG/DL (ref 70–108)
GLUCOSE URINE: NEGATIVE MG/DL
HCO3: 31 MMOL/L (ref 23–28)
HCT VFR BLD CALC: 36 % (ref 37–47)
HEMOGLOBIN: 12.3 GM/DL (ref 12–16)
IFIO2: 3
KETONES, URINE: NEGATIVE
LEUKOCYTE ESTERASE, URINE: NEGATIVE
LYMPHOCYTES # BLD: 15 %
LYMPHOCYTES ABSOLUTE: 0.8 THOU/MM3 (ref 1–4.8)
MCH RBC QN AUTO: 31.8 PG (ref 27–31)
MCHC RBC AUTO-ENTMCNC: 34.1 GM/DL (ref 33–37)
MCV RBC AUTO: 93.2 FL (ref 81–99)
MONOCYTES # BLD: 13.1 %
MONOCYTES ABSOLUTE: 0.7 THOU/MM3 (ref 0.4–1.3)
NITRITE, URINE: NEGATIVE
NUCLEATED RED BLOOD CELLS: 0 /100 WBC
O2 SATURATION: 96 %
PCO2: 50 MMHG (ref 35–45)
PDW BLD-RTO: 13.5 % (ref 11.5–14.5)
PH BLOOD GAS: 7.4 (ref 7.35–7.45)
PH UA: 7
PLATELET # BLD: 167 THOU/MM3 (ref 130–400)
PMV BLD AUTO: 7.9 FL (ref 7.4–10.4)
PO2: 83 MMHG (ref 71–104)
POTASSIUM SERPL-SCNC: 4.1 MEQ/L (ref 3.5–5.2)
PROTEIN UA: NEGATIVE
RBC # BLD: 3.86 MILL/MM3 (ref 4.2–5.4)
SEG NEUTROPHILS: 67.6 %
SEGMENTED NEUTROPHILS ABSOLUTE COUNT: 3.4 THOU/MM3 (ref 1.8–7.7)
SODIUM BLD-SCNC: 135 MEQ/L (ref 135–145)
SOURCE, BLOOD GAS: ABNORMAL
SPECIFIC GRAVITY, URINE: > 1.03 (ref 1–1.03)
UROBILINOGEN, URINE: 1 EU/DL
WBC # BLD: 5.1 THOU/MM3 (ref 4.8–10.8)

## 2018-04-06 PROCEDURE — G8987 SELF CARE CURRENT STATUS: HCPCS

## 2018-04-06 PROCEDURE — 97110 THERAPEUTIC EXERCISES: CPT

## 2018-04-06 PROCEDURE — 96361 HYDRATE IV INFUSION ADD-ON: CPT

## 2018-04-06 PROCEDURE — G8988 SELF CARE GOAL STATUS: HCPCS

## 2018-04-06 PROCEDURE — G8979 MOBILITY GOAL STATUS: HCPCS

## 2018-04-06 PROCEDURE — 85025 COMPLETE CBC W/AUTO DIFF WBC: CPT

## 2018-04-06 PROCEDURE — G8978 MOBILITY CURRENT STATUS: HCPCS

## 2018-04-06 PROCEDURE — 6370000000 HC RX 637 (ALT 250 FOR IP): Performed by: INTERNAL MEDICINE

## 2018-04-06 PROCEDURE — 36600 WITHDRAWAL OF ARTERIAL BLOOD: CPT

## 2018-04-06 PROCEDURE — 80048 BASIC METABOLIC PNL TOTAL CA: CPT

## 2018-04-06 PROCEDURE — 99232 SBSQ HOSP IP/OBS MODERATE 35: CPT | Performed by: INTERNAL MEDICINE

## 2018-04-06 PROCEDURE — 2580000003 HC RX 258: Performed by: INTERNAL MEDICINE

## 2018-04-06 PROCEDURE — 94660 CPAP INITIATION&MGMT: CPT

## 2018-04-06 PROCEDURE — 2700000000 HC OXYGEN THERAPY PER DAY

## 2018-04-06 PROCEDURE — 97161 PT EVAL LOW COMPLEX 20 MIN: CPT

## 2018-04-06 PROCEDURE — 36415 COLL VENOUS BLD VENIPUNCTURE: CPT

## 2018-04-06 PROCEDURE — 94640 AIRWAY INHALATION TREATMENT: CPT

## 2018-04-06 PROCEDURE — 82803 BLOOD GASES ANY COMBINATION: CPT

## 2018-04-06 PROCEDURE — 97166 OT EVAL MOD COMPLEX 45 MIN: CPT

## 2018-04-06 PROCEDURE — 6360000002 HC RX W HCPCS: Performed by: INTERNAL MEDICINE

## 2018-04-06 PROCEDURE — 81003 URINALYSIS AUTO W/O SCOPE: CPT

## 2018-04-06 PROCEDURE — 97535 SELF CARE MNGMENT TRAINING: CPT

## 2018-04-06 PROCEDURE — 1200000000 HC SEMI PRIVATE

## 2018-04-06 PROCEDURE — APPSS45 APP SPLIT SHARED TIME 31-45 MINUTES: Performed by: NURSE PRACTITIONER

## 2018-04-06 RX ADMIN — GABAPENTIN 300 MG: 300 CAPSULE ORAL at 10:20

## 2018-04-06 RX ADMIN — Medication 10 ML: at 21:33

## 2018-04-06 RX ADMIN — ONDANSETRON 4 MG: 2 INJECTION INTRAMUSCULAR; INTRAVENOUS at 21:27

## 2018-04-06 RX ADMIN — IPRATROPIUM BROMIDE AND ALBUTEROL SULFATE 1 AMPULE: .5; 3 SOLUTION RESPIRATORY (INHALATION) at 08:14

## 2018-04-06 RX ADMIN — RAMIPRIL 10 MG: 10 CAPSULE ORAL at 10:20

## 2018-04-06 RX ADMIN — METOPROLOL TARTRATE 50 MG: 50 TABLET, FILM COATED ORAL at 10:20

## 2018-04-06 RX ADMIN — HYDROCHLOROTHIAZIDE 25 MG: 25 TABLET ORAL at 10:20

## 2018-04-06 RX ADMIN — POLYETHYLENE GLYCOL 3350 17 G: 17 POWDER, FOR SOLUTION ORAL at 10:20

## 2018-04-06 RX ADMIN — Medication 10 ML: at 08:36

## 2018-04-06 RX ADMIN — GABAPENTIN 300 MG: 300 CAPSULE ORAL at 15:56

## 2018-04-06 RX ADMIN — ACETAMINOPHEN 650 MG: 325 TABLET ORAL at 21:44

## 2018-04-06 RX ADMIN — PRAVASTATIN SODIUM 10 MG: 10 TABLET ORAL at 21:44

## 2018-04-06 RX ADMIN — IPRATROPIUM BROMIDE AND ALBUTEROL SULFATE 1 AMPULE: .5; 3 SOLUTION RESPIRATORY (INHALATION) at 21:04

## 2018-04-06 RX ADMIN — GABAPENTIN 300 MG: 300 CAPSULE ORAL at 21:44

## 2018-04-06 RX ADMIN — METOPROLOL TARTRATE 50 MG: 50 TABLET, FILM COATED ORAL at 21:44

## 2018-04-06 RX ADMIN — ONDANSETRON 4 MG: 2 INJECTION INTRAMUSCULAR; INTRAVENOUS at 08:36

## 2018-04-06 RX ADMIN — ENOXAPARIN SODIUM 40 MG: 40 INJECTION SUBCUTANEOUS at 21:36

## 2018-04-06 RX ADMIN — IPRATROPIUM BROMIDE AND ALBUTEROL SULFATE 1 AMPULE: .5; 3 SOLUTION RESPIRATORY (INHALATION) at 16:23

## 2018-04-06 RX ADMIN — ACETAMINOPHEN 650 MG: 325 TABLET ORAL at 07:03

## 2018-04-06 RX ADMIN — DOCUSATE SODIUM 100 MG: 100 CAPSULE ORAL at 10:20

## 2018-04-06 RX ADMIN — IPRATROPIUM BROMIDE AND ALBUTEROL SULFATE 1 AMPULE: .5; 3 SOLUTION RESPIRATORY (INHALATION) at 11:35

## 2018-04-06 ASSESSMENT — PAIN DESCRIPTION - PAIN TYPE
TYPE: ACUTE PAIN

## 2018-04-06 ASSESSMENT — PAIN DESCRIPTION - LOCATION
LOCATION: HEAD

## 2018-04-06 ASSESSMENT — PAIN DESCRIPTION - DESCRIPTORS
DESCRIPTORS: HEADACHE

## 2018-04-06 ASSESSMENT — PAIN DESCRIPTION - ONSET
ONSET: ON-GOING

## 2018-04-06 ASSESSMENT — PAIN DESCRIPTION - FREQUENCY
FREQUENCY: INTERMITTENT

## 2018-04-06 ASSESSMENT — PAIN SCALES - GENERAL
PAINLEVEL_OUTOF10: 0
PAINLEVEL_OUTOF10: 0
PAINLEVEL_OUTOF10: 9
PAINLEVEL_OUTOF10: 2
PAINLEVEL_OUTOF10: 0
PAINLEVEL_OUTOF10: 6
PAINLEVEL_OUTOF10: 3
PAINLEVEL_OUTOF10: 0

## 2018-04-07 PROCEDURE — 6370000000 HC RX 637 (ALT 250 FOR IP): Performed by: INTERNAL MEDICINE

## 2018-04-07 PROCEDURE — 94660 CPAP INITIATION&MGMT: CPT

## 2018-04-07 PROCEDURE — 2580000003 HC RX 258: Performed by: INTERNAL MEDICINE

## 2018-04-07 PROCEDURE — 6360000002 HC RX W HCPCS: Performed by: INTERNAL MEDICINE

## 2018-04-07 PROCEDURE — 2700000000 HC OXYGEN THERAPY PER DAY

## 2018-04-07 PROCEDURE — 1200000003 HC TELEMETRY R&B

## 2018-04-07 PROCEDURE — 94640 AIRWAY INHALATION TREATMENT: CPT

## 2018-04-07 RX ADMIN — ENOXAPARIN SODIUM 40 MG: 40 INJECTION SUBCUTANEOUS at 20:03

## 2018-04-07 RX ADMIN — METOPROLOL TARTRATE 50 MG: 50 TABLET, FILM COATED ORAL at 21:27

## 2018-04-07 RX ADMIN — PRAVASTATIN SODIUM 10 MG: 10 TABLET ORAL at 21:27

## 2018-04-07 RX ADMIN — METOPROLOL TARTRATE 50 MG: 50 TABLET, FILM COATED ORAL at 08:14

## 2018-04-07 RX ADMIN — Medication 10 ML: at 08:19

## 2018-04-07 RX ADMIN — GABAPENTIN 300 MG: 300 CAPSULE ORAL at 13:50

## 2018-04-07 RX ADMIN — RAMIPRIL 10 MG: 10 CAPSULE ORAL at 08:14

## 2018-04-07 RX ADMIN — IPRATROPIUM BROMIDE AND ALBUTEROL SULFATE 1 AMPULE: .5; 3 SOLUTION RESPIRATORY (INHALATION) at 21:09

## 2018-04-07 RX ADMIN — IPRATROPIUM BROMIDE AND ALBUTEROL SULFATE 1 AMPULE: .5; 3 SOLUTION RESPIRATORY (INHALATION) at 09:07

## 2018-04-07 RX ADMIN — POLYETHYLENE GLYCOL 3350 17 G: 17 POWDER, FOR SOLUTION ORAL at 08:14

## 2018-04-07 RX ADMIN — ACETAMINOPHEN 650 MG: 325 TABLET ORAL at 12:12

## 2018-04-07 RX ADMIN — HYDROCHLOROTHIAZIDE 25 MG: 25 TABLET ORAL at 08:14

## 2018-04-07 RX ADMIN — ACETAMINOPHEN 650 MG: 325 TABLET ORAL at 08:14

## 2018-04-07 RX ADMIN — Medication 10 ML: at 21:00

## 2018-04-07 RX ADMIN — GABAPENTIN 300 MG: 300 CAPSULE ORAL at 21:27

## 2018-04-07 RX ADMIN — IPRATROPIUM BROMIDE AND ALBUTEROL SULFATE 1 AMPULE: .5; 3 SOLUTION RESPIRATORY (INHALATION) at 13:29

## 2018-04-07 RX ADMIN — IPRATROPIUM BROMIDE AND ALBUTEROL SULFATE 1 AMPULE: .5; 3 SOLUTION RESPIRATORY (INHALATION) at 17:11

## 2018-04-07 RX ADMIN — GABAPENTIN 300 MG: 300 CAPSULE ORAL at 08:14

## 2018-04-07 RX ADMIN — DOCUSATE SODIUM 100 MG: 100 CAPSULE ORAL at 08:14

## 2018-04-07 ASSESSMENT — PAIN SCALES - GENERAL
PAINLEVEL_OUTOF10: 9
PAINLEVEL_OUTOF10: 0
PAINLEVEL_OUTOF10: 3
PAINLEVEL_OUTOF10: 6
PAINLEVEL_OUTOF10: 0
PAINLEVEL_OUTOF10: 10
PAINLEVEL_OUTOF10: 0
PAINLEVEL_OUTOF10: 10
PAINLEVEL_OUTOF10: 6

## 2018-04-07 ASSESSMENT — PAIN DESCRIPTION - DESCRIPTORS
DESCRIPTORS: ACHING
DESCRIPTORS: ACHING

## 2018-04-07 ASSESSMENT — PAIN DESCRIPTION - PAIN TYPE
TYPE: ACUTE PAIN
TYPE: ACUTE PAIN

## 2018-04-07 ASSESSMENT — PAIN DESCRIPTION - ORIENTATION: ORIENTATION: LOWER

## 2018-04-07 ASSESSMENT — PAIN DESCRIPTION - LOCATION
LOCATION: BACK;HEAD
LOCATION: BACK

## 2018-04-08 PROCEDURE — 2580000003 HC RX 258: Performed by: INTERNAL MEDICINE

## 2018-04-08 PROCEDURE — 6360000002 HC RX W HCPCS: Performed by: INTERNAL MEDICINE

## 2018-04-08 PROCEDURE — 6370000000 HC RX 637 (ALT 250 FOR IP): Performed by: INTERNAL MEDICINE

## 2018-04-08 PROCEDURE — 94640 AIRWAY INHALATION TREATMENT: CPT

## 2018-04-08 PROCEDURE — 2700000000 HC OXYGEN THERAPY PER DAY

## 2018-04-08 PROCEDURE — 1200000003 HC TELEMETRY R&B

## 2018-04-08 PROCEDURE — 94660 CPAP INITIATION&MGMT: CPT

## 2018-04-08 RX ADMIN — IPRATROPIUM BROMIDE AND ALBUTEROL SULFATE 1 AMPULE: .5; 3 SOLUTION RESPIRATORY (INHALATION) at 08:36

## 2018-04-08 RX ADMIN — GABAPENTIN 300 MG: 300 CAPSULE ORAL at 08:14

## 2018-04-08 RX ADMIN — ACETAMINOPHEN 650 MG: 325 TABLET ORAL at 11:59

## 2018-04-08 RX ADMIN — GABAPENTIN 300 MG: 300 CAPSULE ORAL at 13:39

## 2018-04-08 RX ADMIN — ENOXAPARIN SODIUM 40 MG: 40 INJECTION SUBCUTANEOUS at 21:12

## 2018-04-08 RX ADMIN — IPRATROPIUM BROMIDE AND ALBUTEROL SULFATE 1 AMPULE: .5; 3 SOLUTION RESPIRATORY (INHALATION) at 11:48

## 2018-04-08 RX ADMIN — HYDROCHLOROTHIAZIDE 25 MG: 25 TABLET ORAL at 08:14

## 2018-04-08 RX ADMIN — METOPROLOL TARTRATE 50 MG: 50 TABLET, FILM COATED ORAL at 21:00

## 2018-04-08 RX ADMIN — IPRATROPIUM BROMIDE AND ALBUTEROL SULFATE 1 AMPULE: .5; 3 SOLUTION RESPIRATORY (INHALATION) at 16:06

## 2018-04-08 RX ADMIN — POLYETHYLENE GLYCOL 3350 17 G: 17 POWDER, FOR SOLUTION ORAL at 08:14

## 2018-04-08 RX ADMIN — GABAPENTIN 300 MG: 300 CAPSULE ORAL at 21:00

## 2018-04-08 RX ADMIN — DOCUSATE SODIUM 100 MG: 100 CAPSULE ORAL at 08:14

## 2018-04-08 RX ADMIN — METOPROLOL TARTRATE 50 MG: 50 TABLET, FILM COATED ORAL at 08:14

## 2018-04-08 RX ADMIN — Medication 10 ML: at 21:01

## 2018-04-08 RX ADMIN — IPRATROPIUM BROMIDE AND ALBUTEROL SULFATE 1 AMPULE: .5; 3 SOLUTION RESPIRATORY (INHALATION) at 21:16

## 2018-04-08 RX ADMIN — PRAVASTATIN SODIUM 10 MG: 10 TABLET ORAL at 21:00

## 2018-04-08 RX ADMIN — Medication 10 ML: at 08:15

## 2018-04-08 RX ADMIN — RAMIPRIL 10 MG: 10 CAPSULE ORAL at 08:14

## 2018-04-08 ASSESSMENT — PAIN SCALES - GENERAL: PAINLEVEL_OUTOF10: 3

## 2018-04-09 ENCOUNTER — HOSPITAL ENCOUNTER (INPATIENT)
Age: 68
LOS: 4 days | Discharge: HOME | DRG: 560 | End: 2018-04-13
Attending: FAMILY MEDICINE | Admitting: FAMILY MEDICINE
Payer: MEDICARE

## 2018-04-09 VITALS
HEART RATE: 75 BPM | WEIGHT: 274.4 LBS | SYSTOLIC BLOOD PRESSURE: 118 MMHG | DIASTOLIC BLOOD PRESSURE: 68 MMHG | TEMPERATURE: 97.9 F | HEIGHT: 65 IN | OXYGEN SATURATION: 94 % | RESPIRATION RATE: 18 BRPM | BODY MASS INDEX: 45.72 KG/M2

## 2018-04-09 PROBLEM — J98.11 ATELECTASIS OF BOTH LUNGS: Status: ACTIVE | Noted: 2018-04-09

## 2018-04-09 PROBLEM — I27.20 PULMONARY HYPERTENSION (HCC): Status: ACTIVE | Noted: 2018-04-09

## 2018-04-09 PROBLEM — J84.10 PULMONARY INTERSTITIAL FIBROSIS (HCC): Status: ACTIVE | Noted: 2018-04-09

## 2018-04-09 PROBLEM — R53.81 PHYSICAL DECONDITIONING: Status: ACTIVE | Noted: 2018-04-09

## 2018-04-09 PROBLEM — J98.4 RESTRICTIVE LUNG DISEASE: Status: ACTIVE | Noted: 2018-04-09

## 2018-04-09 PROCEDURE — 97110 THERAPEUTIC EXERCISES: CPT

## 2018-04-09 PROCEDURE — 94640 AIRWAY INHALATION TREATMENT: CPT

## 2018-04-09 PROCEDURE — 0220000000 HC SKILLED NURSING FACILITY

## 2018-04-09 PROCEDURE — 94660 CPAP INITIATION&MGMT: CPT

## 2018-04-09 PROCEDURE — 2700000000 HC OXYGEN THERAPY PER DAY

## 2018-04-09 PROCEDURE — 2500000003 HC RX 250 WO HCPCS: Performed by: INTERNAL MEDICINE

## 2018-04-09 PROCEDURE — 1290000000 HC SEMI PRIVATE OTHER R&B

## 2018-04-09 PROCEDURE — 6360000002 HC RX W HCPCS: Performed by: INTERNAL MEDICINE

## 2018-04-09 PROCEDURE — 6370000000 HC RX 637 (ALT 250 FOR IP): Performed by: INTERNAL MEDICINE

## 2018-04-09 PROCEDURE — 2580000003 HC RX 258: Performed by: INTERNAL MEDICINE

## 2018-04-09 PROCEDURE — 97116 GAIT TRAINING THERAPY: CPT

## 2018-04-09 PROCEDURE — 99232 SBSQ HOSP IP/OBS MODERATE 35: CPT | Performed by: INTERNAL MEDICINE

## 2018-04-09 PROCEDURE — APPSS30 APP SPLIT SHARED TIME 16-30 MINUTES: Performed by: NURSE PRACTITIONER

## 2018-04-09 RX ORDER — ACETAMINOPHEN 325 MG/1
650 TABLET ORAL EVERY 4 HOURS PRN
Status: CANCELLED | OUTPATIENT
Start: 2018-04-09

## 2018-04-09 RX ORDER — METOPROLOL TARTRATE 50 MG/1
50 TABLET, FILM COATED ORAL 2 TIMES DAILY
Status: DISCONTINUED | OUTPATIENT
Start: 2018-04-09 | End: 2018-04-13 | Stop reason: HOSPADM

## 2018-04-09 RX ORDER — HYDROCHLOROTHIAZIDE 25 MG/1
25 TABLET ORAL DAILY
Status: CANCELLED | OUTPATIENT
Start: 2018-04-10

## 2018-04-09 RX ORDER — GABAPENTIN 300 MG/1
300 CAPSULE ORAL 3 TIMES DAILY
Status: DISCONTINUED | OUTPATIENT
Start: 2018-04-09 | End: 2018-04-13 | Stop reason: HOSPADM

## 2018-04-09 RX ORDER — SODIUM CHLORIDE 0.9 % (FLUSH) 0.9 %
10 SYRINGE (ML) INJECTION EVERY 12 HOURS SCHEDULED
Status: CANCELLED | OUTPATIENT
Start: 2018-04-09

## 2018-04-09 RX ORDER — ONDANSETRON 2 MG/ML
4 INJECTION INTRAMUSCULAR; INTRAVENOUS EVERY 6 HOURS PRN
Status: DISCONTINUED | OUTPATIENT
Start: 2018-04-09 | End: 2018-04-10

## 2018-04-09 RX ORDER — OXYCODONE HYDROCHLORIDE AND ACETAMINOPHEN 5; 325 MG/1; MG/1
1 TABLET ORAL EVERY 6 HOURS PRN
Status: DISCONTINUED | OUTPATIENT
Start: 2018-04-09 | End: 2018-04-13 | Stop reason: HOSPADM

## 2018-04-09 RX ORDER — DOCUSATE SODIUM 100 MG/1
100 CAPSULE, LIQUID FILLED ORAL DAILY
Status: DISCONTINUED | OUTPATIENT
Start: 2018-04-10 | End: 2018-04-13 | Stop reason: HOSPADM

## 2018-04-09 RX ORDER — IPRATROPIUM BROMIDE AND ALBUTEROL SULFATE 2.5; .5 MG/3ML; MG/3ML
1 SOLUTION RESPIRATORY (INHALATION) 4 TIMES DAILY
Status: DISCONTINUED | OUTPATIENT
Start: 2018-04-09 | End: 2018-04-13 | Stop reason: HOSPADM

## 2018-04-09 RX ORDER — GABAPENTIN 300 MG/1
300 CAPSULE ORAL 3 TIMES DAILY
Status: CANCELLED | OUTPATIENT
Start: 2018-04-09

## 2018-04-09 RX ORDER — PRAVASTATIN SODIUM 10 MG
10 TABLET ORAL NIGHTLY
Status: CANCELLED | OUTPATIENT
Start: 2018-04-09

## 2018-04-09 RX ORDER — RAMIPRIL 10 MG/1
10 CAPSULE ORAL DAILY
Status: DISCONTINUED | OUTPATIENT
Start: 2018-04-10 | End: 2018-04-13 | Stop reason: HOSPADM

## 2018-04-09 RX ORDER — ONDANSETRON 2 MG/ML
4 INJECTION INTRAMUSCULAR; INTRAVENOUS EVERY 6 HOURS PRN
Status: CANCELLED | OUTPATIENT
Start: 2018-04-09

## 2018-04-09 RX ORDER — HYDROCHLOROTHIAZIDE 25 MG/1
25 TABLET ORAL DAILY
Status: DISCONTINUED | OUTPATIENT
Start: 2018-04-10 | End: 2018-04-13 | Stop reason: HOSPADM

## 2018-04-09 RX ORDER — SODIUM CHLORIDE 0.9 % (FLUSH) 0.9 %
10 SYRINGE (ML) INJECTION EVERY 12 HOURS SCHEDULED
Status: DISCONTINUED | OUTPATIENT
Start: 2018-04-09 | End: 2018-04-13 | Stop reason: HOSPADM

## 2018-04-09 RX ORDER — SODIUM CHLORIDE 0.9 % (FLUSH) 0.9 %
10 SYRINGE (ML) INJECTION PRN
Status: DISCONTINUED | OUTPATIENT
Start: 2018-04-09 | End: 2018-04-13 | Stop reason: HOSPADM

## 2018-04-09 RX ORDER — IPRATROPIUM BROMIDE AND ALBUTEROL SULFATE 2.5; .5 MG/3ML; MG/3ML
1 SOLUTION RESPIRATORY (INHALATION) 4 TIMES DAILY
Status: CANCELLED | OUTPATIENT
Start: 2018-04-09

## 2018-04-09 RX ORDER — OXYCODONE HYDROCHLORIDE AND ACETAMINOPHEN 5; 325 MG/1; MG/1
1 TABLET ORAL EVERY 6 HOURS PRN
Status: CANCELLED | OUTPATIENT
Start: 2018-04-09

## 2018-04-09 RX ORDER — POLYETHYLENE GLYCOL 3350 17 G/17G
17 POWDER, FOR SOLUTION ORAL DAILY
Status: DISCONTINUED | OUTPATIENT
Start: 2018-04-10 | End: 2018-04-13 | Stop reason: HOSPADM

## 2018-04-09 RX ORDER — RAMIPRIL 10 MG/1
10 CAPSULE ORAL DAILY
Status: CANCELLED | OUTPATIENT
Start: 2018-04-10

## 2018-04-09 RX ORDER — SODIUM CHLORIDE 0.9 % (FLUSH) 0.9 %
10 SYRINGE (ML) INJECTION PRN
Status: CANCELLED | OUTPATIENT
Start: 2018-04-09

## 2018-04-09 RX ORDER — POLYETHYLENE GLYCOL 3350 17 G/17G
17 POWDER, FOR SOLUTION ORAL DAILY
Status: CANCELLED | OUTPATIENT
Start: 2018-04-10

## 2018-04-09 RX ORDER — ACETAMINOPHEN 325 MG/1
650 TABLET ORAL EVERY 4 HOURS PRN
Status: DISCONTINUED | OUTPATIENT
Start: 2018-04-09 | End: 2018-04-13 | Stop reason: HOSPADM

## 2018-04-09 RX ORDER — PRAVASTATIN SODIUM 10 MG
10 TABLET ORAL NIGHTLY
Status: DISCONTINUED | OUTPATIENT
Start: 2018-04-09 | End: 2018-04-13 | Stop reason: HOSPADM

## 2018-04-09 RX ORDER — METOPROLOL TARTRATE 50 MG/1
50 TABLET, FILM COATED ORAL 2 TIMES DAILY
Status: CANCELLED | OUTPATIENT
Start: 2018-04-09

## 2018-04-09 RX ORDER — DOCUSATE SODIUM 100 MG/1
100 CAPSULE, LIQUID FILLED ORAL DAILY
Status: CANCELLED | OUTPATIENT
Start: 2018-04-10

## 2018-04-09 RX ADMIN — IPRATROPIUM BROMIDE AND ALBUTEROL SULFATE 1 AMPULE: .5; 3 SOLUTION RESPIRATORY (INHALATION) at 07:54

## 2018-04-09 RX ADMIN — RAMIPRIL 10 MG: 10 CAPSULE ORAL at 09:05

## 2018-04-09 RX ADMIN — GABAPENTIN 300 MG: 300 CAPSULE ORAL at 14:03

## 2018-04-09 RX ADMIN — DOCUSATE SODIUM 100 MG: 100 CAPSULE ORAL at 09:05

## 2018-04-09 RX ADMIN — Medication 5 UNITS: at 17:32

## 2018-04-09 RX ADMIN — ACETAMINOPHEN 650 MG: 325 TABLET ORAL at 20:20

## 2018-04-09 RX ADMIN — Medication 10 ML: at 09:05

## 2018-04-09 RX ADMIN — IPRATROPIUM BROMIDE AND ALBUTEROL SULFATE 1 AMPULE: .5; 3 SOLUTION RESPIRATORY (INHALATION) at 12:40

## 2018-04-09 RX ADMIN — ACETAMINOPHEN 650 MG: 325 TABLET ORAL at 09:05

## 2018-04-09 RX ADMIN — POLYETHYLENE GLYCOL 3350 17 G: 17 POWDER, FOR SOLUTION ORAL at 09:04

## 2018-04-09 RX ADMIN — GABAPENTIN 300 MG: 300 CAPSULE ORAL at 09:05

## 2018-04-09 RX ADMIN — PRAVASTATIN SODIUM 10 MG: 10 TABLET ORAL at 20:19

## 2018-04-09 RX ADMIN — HYDROCHLOROTHIAZIDE 25 MG: 25 TABLET ORAL at 09:05

## 2018-04-09 RX ADMIN — ENOXAPARIN SODIUM 40 MG: 40 INJECTION SUBCUTANEOUS at 20:19

## 2018-04-09 RX ADMIN — GABAPENTIN 300 MG: 300 CAPSULE ORAL at 20:20

## 2018-04-09 RX ADMIN — IPRATROPIUM BROMIDE AND ALBUTEROL SULFATE 1 AMPULE: .5; 3 SOLUTION RESPIRATORY (INHALATION) at 21:31

## 2018-04-09 RX ADMIN — METOPROLOL TARTRATE 50 MG: 50 TABLET, FILM COATED ORAL at 20:20

## 2018-04-09 RX ADMIN — METOPROLOL TARTRATE 50 MG: 50 TABLET, FILM COATED ORAL at 09:05

## 2018-04-09 ASSESSMENT — PAIN SCALES - GENERAL
PAINLEVEL_OUTOF10: 5
PAINLEVEL_OUTOF10: 6
PAINLEVEL_OUTOF10: 4

## 2018-04-09 ASSESSMENT — PAIN DESCRIPTION - LOCATION
LOCATION: BACK
LOCATION: BACK

## 2018-04-09 ASSESSMENT — PAIN DESCRIPTION - PAIN TYPE
TYPE: ACUTE PAIN
TYPE: ACUTE PAIN

## 2018-04-09 ASSESSMENT — PAIN DESCRIPTION - ORIENTATION
ORIENTATION: LOWER
ORIENTATION: LOWER

## 2018-04-09 ASSESSMENT — PAIN DESCRIPTION - DESCRIPTORS: DESCRIPTORS: ACHING

## 2018-04-09 ASSESSMENT — PAIN DESCRIPTION - FREQUENCY: FREQUENCY: CONTINUOUS

## 2018-04-09 ASSESSMENT — PAIN DESCRIPTION - ONSET: ONSET: ON-GOING

## 2018-04-10 PROCEDURE — 97162 PT EVAL MOD COMPLEX 30 MIN: CPT

## 2018-04-10 PROCEDURE — 6360000002 HC RX W HCPCS: Performed by: INTERNAL MEDICINE

## 2018-04-10 PROCEDURE — 94640 AIRWAY INHALATION TREATMENT: CPT

## 2018-04-10 PROCEDURE — 1290000000 HC SEMI PRIVATE OTHER R&B

## 2018-04-10 PROCEDURE — 97110 THERAPEUTIC EXERCISES: CPT

## 2018-04-10 PROCEDURE — 97535 SELF CARE MNGMENT TRAINING: CPT

## 2018-04-10 PROCEDURE — 97530 THERAPEUTIC ACTIVITIES: CPT

## 2018-04-10 PROCEDURE — 6370000000 HC RX 637 (ALT 250 FOR IP): Performed by: INTERNAL MEDICINE

## 2018-04-10 PROCEDURE — 97166 OT EVAL MOD COMPLEX 45 MIN: CPT

## 2018-04-10 RX ORDER — ONDANSETRON 4 MG/1
4 TABLET, FILM COATED ORAL EVERY 8 HOURS PRN
Status: DISCONTINUED | OUTPATIENT
Start: 2018-04-10 | End: 2018-04-13 | Stop reason: HOSPADM

## 2018-04-10 RX ORDER — SENNA PLUS 8.6 MG/1
1 TABLET ORAL NIGHTLY PRN
Status: DISCONTINUED | OUTPATIENT
Start: 2018-04-10 | End: 2018-04-13 | Stop reason: HOSPADM

## 2018-04-10 RX ADMIN — METOPROLOL TARTRATE 50 MG: 50 TABLET, FILM COATED ORAL at 20:31

## 2018-04-10 RX ADMIN — ACETAMINOPHEN 650 MG: 325 TABLET ORAL at 11:54

## 2018-04-10 RX ADMIN — IPRATROPIUM BROMIDE AND ALBUTEROL SULFATE 1 AMPULE: .5; 3 SOLUTION RESPIRATORY (INHALATION) at 08:27

## 2018-04-10 RX ADMIN — IPRATROPIUM BROMIDE AND ALBUTEROL SULFATE 1 AMPULE: .5; 3 SOLUTION RESPIRATORY (INHALATION) at 13:11

## 2018-04-10 RX ADMIN — ACETAMINOPHEN 650 MG: 325 TABLET ORAL at 15:45

## 2018-04-10 RX ADMIN — GABAPENTIN 300 MG: 300 CAPSULE ORAL at 14:40

## 2018-04-10 RX ADMIN — ENOXAPARIN SODIUM 40 MG: 40 INJECTION SUBCUTANEOUS at 20:32

## 2018-04-10 RX ADMIN — ACETAMINOPHEN 650 MG: 325 TABLET ORAL at 06:46

## 2018-04-10 RX ADMIN — IPRATROPIUM BROMIDE AND ALBUTEROL SULFATE 1 AMPULE: .5; 3 SOLUTION RESPIRATORY (INHALATION) at 16:49

## 2018-04-10 RX ADMIN — GABAPENTIN 300 MG: 300 CAPSULE ORAL at 20:31

## 2018-04-10 RX ADMIN — PRAVASTATIN SODIUM 10 MG: 10 TABLET ORAL at 20:31

## 2018-04-10 RX ADMIN — GABAPENTIN 300 MG: 300 CAPSULE ORAL at 09:58

## 2018-04-10 RX ADMIN — METOPROLOL TARTRATE 50 MG: 50 TABLET, FILM COATED ORAL at 09:58

## 2018-04-10 RX ADMIN — RAMIPRIL 10 MG: 10 CAPSULE ORAL at 09:58

## 2018-04-10 RX ADMIN — HYDROCHLOROTHIAZIDE 25 MG: 25 TABLET ORAL at 09:58

## 2018-04-10 RX ADMIN — ACETAMINOPHEN 650 MG: 325 TABLET ORAL at 20:32

## 2018-04-10 ASSESSMENT — PAIN SCALES - GENERAL
PAINLEVEL_OUTOF10: 0
PAINLEVEL_OUTOF10: 5
PAINLEVEL_OUTOF10: 3
PAINLEVEL_OUTOF10: 4
PAINLEVEL_OUTOF10: 0
PAINLEVEL_OUTOF10: 6
PAINLEVEL_OUTOF10: 5
PAINLEVEL_OUTOF10: 7
PAINLEVEL_OUTOF10: 5

## 2018-04-10 ASSESSMENT — PAIN DESCRIPTION - PAIN TYPE: TYPE: SURGICAL PAIN

## 2018-04-10 ASSESSMENT — PAIN DESCRIPTION - LOCATION: LOCATION: BACK

## 2018-04-11 LAB
BLOOD CULTURE, ROUTINE: NORMAL
BLOOD CULTURE, ROUTINE: NORMAL

## 2018-04-11 PROCEDURE — 6370000000 HC RX 637 (ALT 250 FOR IP): Performed by: INTERNAL MEDICINE

## 2018-04-11 PROCEDURE — 97530 THERAPEUTIC ACTIVITIES: CPT

## 2018-04-11 PROCEDURE — 1290000000 HC SEMI PRIVATE OTHER R&B

## 2018-04-11 PROCEDURE — 97116 GAIT TRAINING THERAPY: CPT

## 2018-04-11 PROCEDURE — 94640 AIRWAY INHALATION TREATMENT: CPT

## 2018-04-11 PROCEDURE — 97110 THERAPEUTIC EXERCISES: CPT

## 2018-04-11 PROCEDURE — 97535 SELF CARE MNGMENT TRAINING: CPT

## 2018-04-11 PROCEDURE — 6360000002 HC RX W HCPCS: Performed by: INTERNAL MEDICINE

## 2018-04-11 RX ADMIN — IPRATROPIUM BROMIDE AND ALBUTEROL SULFATE 1 AMPULE: .5; 3 SOLUTION RESPIRATORY (INHALATION) at 22:16

## 2018-04-11 RX ADMIN — IPRATROPIUM BROMIDE AND ALBUTEROL SULFATE 1 AMPULE: .5; 3 SOLUTION RESPIRATORY (INHALATION) at 12:52

## 2018-04-11 RX ADMIN — PRAVASTATIN SODIUM 10 MG: 10 TABLET ORAL at 20:09

## 2018-04-11 RX ADMIN — GABAPENTIN 300 MG: 300 CAPSULE ORAL at 11:00

## 2018-04-11 RX ADMIN — RAMIPRIL 10 MG: 10 CAPSULE ORAL at 11:02

## 2018-04-11 RX ADMIN — ACETAMINOPHEN 650 MG: 325 TABLET ORAL at 10:14

## 2018-04-11 RX ADMIN — GABAPENTIN 300 MG: 300 CAPSULE ORAL at 16:04

## 2018-04-11 RX ADMIN — METOPROLOL TARTRATE 50 MG: 50 TABLET, FILM COATED ORAL at 11:00

## 2018-04-11 RX ADMIN — ACETAMINOPHEN 650 MG: 325 TABLET ORAL at 20:09

## 2018-04-11 RX ADMIN — IPRATROPIUM BROMIDE AND ALBUTEROL SULFATE 1 AMPULE: .5; 3 SOLUTION RESPIRATORY (INHALATION) at 08:04

## 2018-04-11 RX ADMIN — METOPROLOL TARTRATE 50 MG: 50 TABLET, FILM COATED ORAL at 21:21

## 2018-04-11 RX ADMIN — HYDROCHLOROTHIAZIDE 25 MG: 25 TABLET ORAL at 11:00

## 2018-04-11 RX ADMIN — IPRATROPIUM BROMIDE AND ALBUTEROL SULFATE 1 AMPULE: .5; 3 SOLUTION RESPIRATORY (INHALATION) at 17:30

## 2018-04-11 RX ADMIN — ACETAMINOPHEN 650 MG: 325 TABLET ORAL at 16:04

## 2018-04-11 RX ADMIN — ACETAMINOPHEN 650 MG: 325 TABLET ORAL at 04:30

## 2018-04-11 RX ADMIN — GABAPENTIN 300 MG: 300 CAPSULE ORAL at 20:09

## 2018-04-11 RX ADMIN — ENOXAPARIN SODIUM 40 MG: 40 INJECTION SUBCUTANEOUS at 20:11

## 2018-04-11 ASSESSMENT — PAIN SCALES - GENERAL
PAINLEVEL_OUTOF10: 3
PAINLEVEL_OUTOF10: 4
PAINLEVEL_OUTOF10: 3
PAINLEVEL_OUTOF10: 5
PAINLEVEL_OUTOF10: 3
PAINLEVEL_OUTOF10: 5
PAINLEVEL_OUTOF10: 5
PAINLEVEL_OUTOF10: 3
PAINLEVEL_OUTOF10: 3

## 2018-04-11 ASSESSMENT — PAIN DESCRIPTION - ORIENTATION: ORIENTATION: LOWER

## 2018-04-11 ASSESSMENT — PAIN DESCRIPTION - PAIN TYPE: TYPE: SURGICAL PAIN

## 2018-04-11 ASSESSMENT — PAIN DESCRIPTION - LOCATION: LOCATION: BACK

## 2018-04-12 PROCEDURE — 6360000002 HC RX W HCPCS: Performed by: INTERNAL MEDICINE

## 2018-04-12 PROCEDURE — 97110 THERAPEUTIC EXERCISES: CPT

## 2018-04-12 PROCEDURE — 6370000000 HC RX 637 (ALT 250 FOR IP): Performed by: INTERNAL MEDICINE

## 2018-04-12 PROCEDURE — 97116 GAIT TRAINING THERAPY: CPT

## 2018-04-12 PROCEDURE — 97530 THERAPEUTIC ACTIVITIES: CPT

## 2018-04-12 PROCEDURE — 1290000000 HC SEMI PRIVATE OTHER R&B

## 2018-04-12 PROCEDURE — 94640 AIRWAY INHALATION TREATMENT: CPT

## 2018-04-12 RX ORDER — ACETAMINOPHEN 325 MG/1
650 TABLET ORAL EVERY 4 HOURS PRN
COMMUNITY
Start: 2018-04-12 | End: 2022-01-25 | Stop reason: SINTOL

## 2018-04-12 RX ADMIN — METOPROLOL TARTRATE 50 MG: 50 TABLET, FILM COATED ORAL at 21:09

## 2018-04-12 RX ADMIN — GABAPENTIN 300 MG: 300 CAPSULE ORAL at 21:10

## 2018-04-12 RX ADMIN — IPRATROPIUM BROMIDE AND ALBUTEROL SULFATE 1 AMPULE: .5; 3 SOLUTION RESPIRATORY (INHALATION) at 22:28

## 2018-04-12 RX ADMIN — ENOXAPARIN SODIUM 40 MG: 40 INJECTION SUBCUTANEOUS at 21:09

## 2018-04-12 RX ADMIN — IPRATROPIUM BROMIDE AND ALBUTEROL SULFATE 1 AMPULE: .5; 3 SOLUTION RESPIRATORY (INHALATION) at 13:12

## 2018-04-12 RX ADMIN — HYDROCHLOROTHIAZIDE 25 MG: 25 TABLET ORAL at 08:27

## 2018-04-12 RX ADMIN — GABAPENTIN 300 MG: 300 CAPSULE ORAL at 13:08

## 2018-04-12 RX ADMIN — PRAVASTATIN SODIUM 10 MG: 10 TABLET ORAL at 21:09

## 2018-04-12 RX ADMIN — GABAPENTIN 300 MG: 300 CAPSULE ORAL at 08:26

## 2018-04-12 RX ADMIN — IPRATROPIUM BROMIDE AND ALBUTEROL SULFATE 1 AMPULE: .5; 3 SOLUTION RESPIRATORY (INHALATION) at 08:08

## 2018-04-12 RX ADMIN — RAMIPRIL 10 MG: 10 CAPSULE ORAL at 08:26

## 2018-04-12 RX ADMIN — ACETAMINOPHEN 650 MG: 325 TABLET ORAL at 21:10

## 2018-04-12 RX ADMIN — IPRATROPIUM BROMIDE AND ALBUTEROL SULFATE 1 AMPULE: .5; 3 SOLUTION RESPIRATORY (INHALATION) at 17:39

## 2018-04-12 ASSESSMENT — PAIN SCALES - GENERAL
PAINLEVEL_OUTOF10: 1
PAINLEVEL_OUTOF10: 0
PAINLEVEL_OUTOF10: 0

## 2018-04-13 VITALS
HEART RATE: 93 BPM | DIASTOLIC BLOOD PRESSURE: 72 MMHG | HEIGHT: 65 IN | WEIGHT: 271.5 LBS | BODY MASS INDEX: 45.23 KG/M2 | TEMPERATURE: 96.5 F | OXYGEN SATURATION: 92 % | SYSTOLIC BLOOD PRESSURE: 134 MMHG | RESPIRATION RATE: 16 BRPM

## 2018-04-13 PROCEDURE — 97116 GAIT TRAINING THERAPY: CPT

## 2018-04-13 PROCEDURE — 6370000000 HC RX 637 (ALT 250 FOR IP): Performed by: INTERNAL MEDICINE

## 2018-04-13 PROCEDURE — 97530 THERAPEUTIC ACTIVITIES: CPT

## 2018-04-13 PROCEDURE — 94640 AIRWAY INHALATION TREATMENT: CPT

## 2018-04-13 PROCEDURE — 97110 THERAPEUTIC EXERCISES: CPT

## 2018-04-13 PROCEDURE — 94761 N-INVAS EAR/PLS OXIMETRY MLT: CPT

## 2018-04-13 PROCEDURE — 0220000000 HC SKILLED NURSING FACILITY

## 2018-04-13 RX ADMIN — METOPROLOL TARTRATE 50 MG: 50 TABLET, FILM COATED ORAL at 10:23

## 2018-04-13 RX ADMIN — RAMIPRIL 10 MG: 10 CAPSULE ORAL at 10:23

## 2018-04-13 RX ADMIN — IPRATROPIUM BROMIDE AND ALBUTEROL SULFATE 1 AMPULE: .5; 3 SOLUTION RESPIRATORY (INHALATION) at 07:54

## 2018-04-13 RX ADMIN — HYDROCHLOROTHIAZIDE 25 MG: 25 TABLET ORAL at 10:23

## 2018-04-13 RX ADMIN — GABAPENTIN 300 MG: 300 CAPSULE ORAL at 10:23

## 2018-04-13 ASSESSMENT — PAIN SCALES - GENERAL: PAINLEVEL_OUTOF10: 0

## 2018-04-16 ENCOUNTER — HOSPITAL ENCOUNTER (OUTPATIENT)
Dept: PHYSICAL THERAPY | Age: 68
Setting detail: THERAPIES SERIES
Discharge: HOME | End: 2018-04-16
Payer: MEDICARE

## 2018-04-16 PROCEDURE — 97110 THERAPEUTIC EXERCISES: CPT

## 2018-04-16 PROCEDURE — 97162 PT EVAL MOD COMPLEX 30 MIN: CPT

## 2018-04-16 PROCEDURE — G8991 OTHER PT/OT GOAL STATUS: HCPCS

## 2018-04-16 PROCEDURE — G8990 OTHER PT/OT CURRENT STATUS: HCPCS

## 2018-04-16 ASSESSMENT — PAIN DESCRIPTION - ORIENTATION: ORIENTATION: RIGHT

## 2018-04-16 ASSESSMENT — PAIN DESCRIPTION - PAIN TYPE: TYPE: ACUTE PAIN

## 2018-04-16 ASSESSMENT — PAIN SCALES - GENERAL: PAINLEVEL_OUTOF10: 3

## 2018-04-16 ASSESSMENT — PAIN DESCRIPTION - LOCATION: LOCATION: BACK

## 2018-04-18 ENCOUNTER — HOSPITAL ENCOUNTER (OUTPATIENT)
Dept: PHYSICAL THERAPY | Age: 68
Setting detail: THERAPIES SERIES
Discharge: HOME | End: 2018-04-18
Payer: MEDICARE

## 2018-04-18 PROCEDURE — 97110 THERAPEUTIC EXERCISES: CPT

## 2018-04-18 ASSESSMENT — PAIN SCALES - GENERAL: PAINLEVEL_OUTOF10: 3

## 2018-04-23 ENCOUNTER — HOSPITAL ENCOUNTER (OUTPATIENT)
Dept: PHYSICAL THERAPY | Age: 68
Setting detail: THERAPIES SERIES
Discharge: HOME | End: 2018-04-23
Payer: MEDICARE

## 2018-04-23 PROCEDURE — 97110 THERAPEUTIC EXERCISES: CPT

## 2018-04-25 ENCOUNTER — HOSPITAL ENCOUNTER (OUTPATIENT)
Dept: PHYSICAL THERAPY | Age: 68
Setting detail: THERAPIES SERIES
Discharge: HOME | End: 2018-04-25
Payer: MEDICARE

## 2018-04-25 PROCEDURE — 97110 THERAPEUTIC EXERCISES: CPT

## 2018-04-30 ENCOUNTER — HOSPITAL ENCOUNTER (OUTPATIENT)
Dept: PHYSICAL THERAPY | Age: 68
Setting detail: THERAPIES SERIES
Discharge: HOME | End: 2018-04-30
Payer: MEDICARE

## 2018-04-30 PROCEDURE — 97110 THERAPEUTIC EXERCISES: CPT

## 2018-05-02 ENCOUNTER — HOSPITAL ENCOUNTER (OUTPATIENT)
Dept: PHYSICAL THERAPY | Age: 68
Setting detail: THERAPIES SERIES
Discharge: HOME | End: 2018-05-02
Payer: MEDICARE

## 2018-05-02 PROCEDURE — 97110 THERAPEUTIC EXERCISES: CPT

## 2018-05-07 ENCOUNTER — HOSPITAL ENCOUNTER (OUTPATIENT)
Dept: PHYSICAL THERAPY | Age: 68
Setting detail: THERAPIES SERIES
Discharge: HOME | End: 2018-05-07
Payer: MEDICARE

## 2018-05-07 PROCEDURE — 97110 THERAPEUTIC EXERCISES: CPT

## 2018-05-09 ENCOUNTER — HOSPITAL ENCOUNTER (OUTPATIENT)
Dept: PHYSICAL THERAPY | Age: 68
Setting detail: THERAPIES SERIES
Discharge: HOME | End: 2018-05-09
Payer: MEDICARE

## 2018-05-09 PROCEDURE — G8992 OTHER PT/OT  D/C STATUS: HCPCS

## 2018-05-09 PROCEDURE — 97110 THERAPEUTIC EXERCISES: CPT

## 2018-05-09 PROCEDURE — G8991 OTHER PT/OT GOAL STATUS: HCPCS

## 2018-09-04 ENCOUNTER — HOSPITAL ENCOUNTER (OUTPATIENT)
Age: 68
Discharge: HOME | End: 2018-09-04
Payer: MEDICARE

## 2018-09-04 LAB
ALBUMIN SERPL-MCNC: 4 G/DL (ref 3.5–5.1)
ALP BLD-CCNC: 58 U/L (ref 38–126)
ALT SERPL-CCNC: 24 U/L (ref 11–66)
ANION GAP SERPL CALCULATED.3IONS-SCNC: 11 MEQ/L (ref 8–16)
AST SERPL-CCNC: 23 U/L (ref 5–40)
BASOPHILS # BLD: 0.7 %
BASOPHILS ABSOLUTE: 0 THOU/MM3 (ref 0–0.1)
BILIRUB SERPL-MCNC: 1.1 MG/DL (ref 0.3–1.2)
BILIRUBIN DIRECT: < 0.2 MG/DL (ref 0–0.3)
BUN BLDV-MCNC: 18 MG/DL (ref 7–22)
CALCIUM SERPL-MCNC: 10.2 MG/DL (ref 8.5–10.5)
CHLORIDE BLD-SCNC: 102 MEQ/L (ref 98–111)
CHOLESTEROL, TOTAL: 195 MG/DL (ref 100–199)
CO2: 27 MEQ/L (ref 23–33)
CREAT SERPL-MCNC: 0.7 MG/DL (ref 0.4–1.2)
EOSINOPHIL # BLD: 2.6 %
EOSINOPHILS ABSOLUTE: 0.1 THOU/MM3 (ref 0–0.4)
ERYTHROCYTE [DISTWIDTH] IN BLOOD BY AUTOMATED COUNT: 13.1 % (ref 11.5–14.5)
ERYTHROCYTE [DISTWIDTH] IN BLOOD BY AUTOMATED COUNT: 42.4 FL (ref 35–45)
GFR SERPL CREATININE-BSD FRML MDRD: 83 ML/MIN/1.73M2
GLUCOSE BLD-MCNC: 111 MG/DL (ref 70–108)
HCT VFR BLD CALC: 44.3 % (ref 37–47)
HDLC SERPL-MCNC: 48 MG/DL
HEMOGLOBIN: 15.5 GM/DL (ref 12–16)
IMMATURE GRANS (ABS): 0.04 THOU/MM3 (ref 0–0.07)
IMMATURE GRANULOCYTES: 0.9 %
LDL CHOLESTEROL CALCULATED: 112 MG/DL
LYMPHOCYTES # BLD: 33.3 %
LYMPHOCYTES ABSOLUTE: 1.5 THOU/MM3 (ref 1–4.8)
MCH RBC QN AUTO: 31.2 PG (ref 26–33)
MCHC RBC AUTO-ENTMCNC: 35 GM/DL (ref 32.2–35.5)
MCV RBC AUTO: 89.1 FL (ref 81–99)
MONOCYTES # BLD: 9 %
MONOCYTES ABSOLUTE: 0.4 THOU/MM3 (ref 0.4–1.3)
NUCLEATED RED BLOOD CELLS: 0 /100 WBC
PLATELET # BLD: 189 THOU/MM3 (ref 130–400)
PMV BLD AUTO: 9.8 FL (ref 9.4–12.4)
POTASSIUM SERPL-SCNC: 4 MEQ/L (ref 3.5–5.2)
RBC # BLD: 4.97 MILL/MM3 (ref 4.2–5.4)
SEG NEUTROPHILS: 53.5 %
SEGMENTED NEUTROPHILS ABSOLUTE COUNT: 2.5 THOU/MM3 (ref 1.8–7.7)
SODIUM BLD-SCNC: 140 MEQ/L (ref 135–145)
TOTAL PROTEIN: 6.7 G/DL (ref 6.1–8)
TRIGL SERPL-MCNC: 175 MG/DL (ref 0–199)
WBC # BLD: 4.6 THOU/MM3 (ref 4.8–10.8)

## 2018-09-04 PROCEDURE — 80053 COMPREHEN METABOLIC PANEL: CPT

## 2018-09-04 PROCEDURE — 82248 BILIRUBIN DIRECT: CPT

## 2018-09-04 PROCEDURE — 85027 COMPLETE CBC AUTOMATED: CPT

## 2018-09-04 PROCEDURE — 80061 LIPID PANEL: CPT

## 2018-09-04 PROCEDURE — 36415 COLL VENOUS BLD VENIPUNCTURE: CPT

## 2018-09-04 PROCEDURE — 85025 COMPLETE CBC W/AUTO DIFF WBC: CPT

## 2019-01-02 ENCOUNTER — HOSPITAL ENCOUNTER (OUTPATIENT)
Dept: WOMENS IMAGING | Age: 69
Discharge: HOME | End: 2019-01-02
Payer: MEDICARE

## 2019-01-02 DIAGNOSIS — Z12.31 VISIT FOR SCREENING MAMMOGRAM: ICD-10-CM

## 2019-01-02 PROCEDURE — 77063 BREAST TOMOSYNTHESIS BI: CPT

## 2019-09-09 ENCOUNTER — HOSPITAL ENCOUNTER (OUTPATIENT)
Age: 69
Discharge: HOME | End: 2019-09-09
Payer: MEDICARE

## 2019-09-09 LAB
ALBUMIN SERPL-MCNC: 4.2 G/DL (ref 3.5–5.1)
ALBUMIN SERPL-MCNC: 4.3 G/DL (ref 3.5–5.1)
ALP BLD-CCNC: 80 U/L (ref 38–126)
ALP BLD-CCNC: 84 U/L (ref 38–126)
ALT SERPL-CCNC: 29 U/L (ref 11–66)
ALT SERPL-CCNC: 29 U/L (ref 11–66)
ANION GAP SERPL CALCULATED.3IONS-SCNC: 12 MEQ/L (ref 8–16)
AST SERPL-CCNC: 23 U/L (ref 5–40)
AST SERPL-CCNC: 24 U/L (ref 5–40)
BACTERIA: ABNORMAL
BASOPHILS # BLD: 1 %
BASOPHILS ABSOLUTE: 0.1 THOU/MM3 (ref 0–0.1)
BILIRUB SERPL-MCNC: 1.2 MG/DL (ref 0.3–1.2)
BILIRUB SERPL-MCNC: 1.2 MG/DL (ref 0.3–1.2)
BILIRUBIN DIRECT: < 0.2 MG/DL (ref 0–0.3)
BILIRUBIN URINE: NEGATIVE
BLOOD, URINE: NEGATIVE
BUN BLDV-MCNC: 13 MG/DL (ref 7–22)
CALCIUM SERPL-MCNC: 10.5 MG/DL (ref 8.5–10.5)
CASTS: ABNORMAL /LPF
CASTS: ABNORMAL /LPF
CHARACTER, URINE: ABNORMAL
CHLORIDE BLD-SCNC: 101 MEQ/L (ref 98–111)
CHOLESTEROL, TOTAL: 164 MG/DL (ref 100–199)
CO2: 28 MEQ/L (ref 23–33)
COLOR: YELLOW
CREAT SERPL-MCNC: 0.8 MG/DL (ref 0.4–1.2)
CRYSTALS: ABNORMAL
EOSINOPHIL # BLD: 2.8 %
EOSINOPHILS ABSOLUTE: 0.1 THOU/MM3 (ref 0–0.4)
EPITHELIAL CELLS, UA: ABNORMAL /HPF
ERYTHROCYTE [DISTWIDTH] IN BLOOD BY AUTOMATED COUNT: 12.7 % (ref 11.5–14.5)
ERYTHROCYTE [DISTWIDTH] IN BLOOD BY AUTOMATED COUNT: 42.5 FL (ref 35–45)
GFR SERPL CREATININE-BSD FRML MDRD: 71 ML/MIN/1.73M2
GLUCOSE BLD-MCNC: 120 MG/DL (ref 70–108)
GLUCOSE, URINE: NEGATIVE MG/DL
HCT VFR BLD CALC: 46.5 % (ref 37–47)
HDLC SERPL-MCNC: 55 MG/DL
HEMOGLOBIN: 15.7 GM/DL (ref 12–16)
IMMATURE GRANS (ABS): 0.04 THOU/MM3 (ref 0–0.07)
IMMATURE GRANULOCYTES: 1 %
KETONES, URINE: NEGATIVE
LDL CHOLESTEROL CALCULATED: 82 MG/DL
LEUKOCYTE EST, POC: ABNORMAL
LYMPHOCYTES # BLD: 24.4 %
LYMPHOCYTES ABSOLUTE: 1.2 THOU/MM3 (ref 1–4.8)
MCH RBC QN AUTO: 31.2 PG (ref 26–33)
MCHC RBC AUTO-ENTMCNC: 33.8 GM/DL (ref 32.2–35.5)
MCV RBC AUTO: 92.4 FL (ref 81–99)
MISCELLANEOUS LAB TEST RESULT: ABNORMAL
MONOCYTES # BLD: 10.6 %
MONOCYTES ABSOLUTE: 0.5 THOU/MM3 (ref 0.4–1.3)
NITRITE, URINE: NEGATIVE
NUCLEATED RED BLOOD CELLS: 0 /100 WBC
PH UA: 7 (ref 5–9)
PLATELET # BLD: 201 THOU/MM3 (ref 130–400)
PMV BLD AUTO: 10 FL (ref 9.4–12.4)
POTASSIUM SERPL-SCNC: 4 MEQ/L (ref 3.5–5.2)
PROTEIN UA: NEGATIVE MG/DL
RBC # BLD: 5.03 MILL/MM3 (ref 4.2–5.4)
RBC URINE: ABNORMAL /HPF
RENAL EPITHELIAL, UA: ABNORMAL
SEG NEUTROPHILS: 60.4 %
SEGMENTED NEUTROPHILS ABSOLUTE COUNT: 3 THOU/MM3 (ref 1.8–7.7)
SODIUM BLD-SCNC: 141 MEQ/L (ref 135–145)
SPECIFIC GRAVITY UA: 1.02 (ref 1–1.03)
TOTAL PROTEIN: 7.1 G/DL (ref 6.1–8)
TOTAL PROTEIN: 7.3 G/DL (ref 6.1–8)
TRIGL SERPL-MCNC: 136 MG/DL (ref 0–199)
UROBILINOGEN, URINE: 1 EU/DL (ref 0–1)
WBC # BLD: 5 THOU/MM3 (ref 4.8–10.8)
WBC UA: ABNORMAL /HPF
YEAST: ABNORMAL

## 2019-09-09 PROCEDURE — 80076 HEPATIC FUNCTION PANEL: CPT

## 2019-09-09 PROCEDURE — 85025 COMPLETE CBC W/AUTO DIFF WBC: CPT

## 2019-09-09 PROCEDURE — 36415 COLL VENOUS BLD VENIPUNCTURE: CPT

## 2019-09-09 PROCEDURE — 80061 LIPID PANEL: CPT

## 2019-09-09 PROCEDURE — 81001 URINALYSIS AUTO W/SCOPE: CPT

## 2019-09-09 PROCEDURE — 80053 COMPREHEN METABOLIC PANEL: CPT

## 2020-01-28 ENCOUNTER — HOSPITAL ENCOUNTER (OUTPATIENT)
Dept: WOMENS IMAGING | Age: 70
Discharge: HOME | End: 2020-01-28
Payer: MEDICARE

## 2020-01-28 PROCEDURE — 77063 BREAST TOMOSYNTHESIS BI: CPT

## 2020-02-13 ENCOUNTER — HOSPITAL ENCOUNTER (EMERGENCY)
Age: 70
Discharge: HOME | End: 2020-02-13
Payer: MEDICARE

## 2020-02-13 ENCOUNTER — APPOINTMENT (OUTPATIENT)
Dept: GENERAL RADIOLOGY | Age: 70
End: 2020-02-13
Payer: MEDICARE

## 2020-02-13 ENCOUNTER — APPOINTMENT (OUTPATIENT)
Dept: CT IMAGING | Age: 70
End: 2020-02-13
Payer: MEDICARE

## 2020-02-13 VITALS
HEART RATE: 91 BPM | WEIGHT: 280 LBS | HEIGHT: 65 IN | OXYGEN SATURATION: 93 % | SYSTOLIC BLOOD PRESSURE: 138 MMHG | TEMPERATURE: 98.5 F | DIASTOLIC BLOOD PRESSURE: 70 MMHG | RESPIRATION RATE: 17 BRPM | BODY MASS INDEX: 46.65 KG/M2

## 2020-02-13 PROCEDURE — 73610 X-RAY EXAM OF ANKLE: CPT

## 2020-02-13 PROCEDURE — 73700 CT LOWER EXTREMITY W/O DYE: CPT

## 2020-02-13 PROCEDURE — 6370000000 HC RX 637 (ALT 250 FOR IP): Performed by: PHYSICIAN ASSISTANT

## 2020-02-13 PROCEDURE — 2709999900 HC NON-CHARGEABLE SUPPLY

## 2020-02-13 PROCEDURE — 99284 EMERGENCY DEPT VISIT MOD MDM: CPT

## 2020-02-13 RX ORDER — TRAMADOL HYDROCHLORIDE 50 MG/1
50 TABLET ORAL ONCE
Status: DISCONTINUED | OUTPATIENT
Start: 2020-02-13 | End: 2020-02-13

## 2020-02-13 RX ORDER — ACETAMINOPHEN 325 MG/1
650 TABLET ORAL ONCE
Status: COMPLETED | OUTPATIENT
Start: 2020-02-13 | End: 2020-02-13

## 2020-02-13 RX ORDER — HYDROCODONE BITARTRATE AND ACETAMINOPHEN 5; 325 MG/1; MG/1
1 TABLET ORAL EVERY 6 HOURS PRN
Qty: 10 TABLET | Refills: 0 | Status: SHIPPED | OUTPATIENT
Start: 2020-02-13 | End: 2020-02-16

## 2020-02-13 RX ORDER — HYDROCODONE BITARTRATE AND ACETAMINOPHEN 5; 325 MG/1; MG/1
1 TABLET ORAL ONCE
Status: COMPLETED | OUTPATIENT
Start: 2020-02-13 | End: 2020-02-13

## 2020-02-13 RX ORDER — TRAMADOL HYDROCHLORIDE 50 MG/1
50 TABLET ORAL EVERY 6 HOURS PRN
Qty: 12 TABLET | Refills: 0 | Status: SHIPPED | OUTPATIENT
Start: 2020-02-13 | End: 2020-02-13

## 2020-02-13 RX ADMIN — HYDROCODONE BITARTRATE AND ACETAMINOPHEN 1 TABLET: 5; 325 TABLET ORAL at 22:11

## 2020-02-13 RX ADMIN — ACETAMINOPHEN 650 MG: 325 TABLET ORAL at 20:07

## 2020-02-13 ASSESSMENT — ENCOUNTER SYMPTOMS
VOMITING: 0
SHORTNESS OF BREATH: 0
COLOR CHANGE: 0
NAUSEA: 0
ABDOMINAL PAIN: 0
BACK PAIN: 0

## 2020-02-13 ASSESSMENT — PAIN DESCRIPTION - ONSET: ONSET: ON-GOING

## 2020-02-13 ASSESSMENT — PAIN DESCRIPTION - PAIN TYPE: TYPE: ACUTE PAIN

## 2020-02-13 ASSESSMENT — PAIN SCALES - GENERAL
PAINLEVEL_OUTOF10: 7
PAINLEVEL_OUTOF10: 5
PAINLEVEL_OUTOF10: 6

## 2020-02-13 ASSESSMENT — PAIN DESCRIPTION - PROGRESSION: CLINICAL_PROGRESSION: NOT CHANGED

## 2020-02-13 ASSESSMENT — PAIN DESCRIPTION - DESCRIPTORS: DESCRIPTORS: STABBING

## 2020-02-13 ASSESSMENT — PAIN DESCRIPTION - LOCATION: LOCATION: ANKLE

## 2020-02-13 ASSESSMENT — PAIN DESCRIPTION - FREQUENCY: FREQUENCY: CONTINUOUS

## 2020-02-13 ASSESSMENT — PAIN DESCRIPTION - ORIENTATION: ORIENTATION: LEFT

## 2020-02-14 NOTE — ED TRIAGE NOTES
Pt to ED today via triage desk with c/o left ankle pain after she had injured it while shoveling snow at 1600 today. Pt rates current pain at 5/10 while sitting and 10/10 while putting weight on it. Pt with some swelling noted in comparison to right ankle. Pt states previous sx on affected ankle.

## 2020-02-14 NOTE — ED PROVIDER NOTES
[facility] Emergency Department    CHIEF COMPLAINT       Chief Complaint   Patient presents with    Ankle Pain     left       Nurses Notes reviewed and I agree except as noted in the HPI. HISTORY OF PRESENT ILLNESS    Lucian Lawrence is a 78 y.o. female who presents to the ED for evaluation of left ankle pain. The patient states that she rolled her left ankle while shoveling snow in her driveway earlier today. The patient denies falling to the ground after this injury. She denies any head injury, LOC, or use of anticoagulants. The patient states that she was initially able to bear weight on her left foot. However, she states that her left ankle pain has gradually worsened since its onset, and she states that she can no longer bear weight on her left ankle due to her pain. She reports mild swelling of the left ankle but denies noted bruising. The patient reports a \"pins and needles\" sensation in the left toes but denies any weakness, numbness, or tingling. She denies any pain of her left hip or left knee. The patient reports a history of a left foot and ankle surgery and states that she had 9 screws placed in her left ankle. The patient has medical history of osteoarthritis and arthritis. The patient denies any additional injuries or areas of pain. The patient has no further acute complaints at this time. HPI was provided by the patient. REVIEW OF SYSTEMS     Review of Systems   Constitutional: Negative for fatigue. Respiratory: Negative for shortness of breath. Cardiovascular: Negative for chest pain. Gastrointestinal: Negative for abdominal pain, nausea and vomiting. Genitourinary: Negative for difficulty urinating and dysuria. Musculoskeletal: Positive for arthralgias (left ankle), gait problem (pain with weight bearing on LLE) and joint swelling (left ankle). Negative for back pain, myalgias, neck pain and neck stiffness. Skin: Negative for color change, pallor and wound.    Neurological: Negative for dizziness, syncope, weakness, light-headedness, numbness and headaches. Reports \"pins and needles\" sensation in left toes   Hematological: Does not bruise/bleed easily. PAST MEDICAL HISTORY     Past Medical History:   Diagnosis Date    Anxiety     Arthritis     GERD (gastroesophageal reflux disease)     History of kidney stones     Hyperlipidemia     Hypertension     Kidney stone     Osteoarthritis     Sleep apnea        SURGICALHISTORY      has a past surgical history that includes Colonoscopy (-); Dilation and curettage of uterus (); Lithotripsy (3-9-11); Foot surgery (Left, ); Cystoscopy (); Throat surgery (-); and Cardiac catheterization. CURRENT MEDICATIONS       Discharge Medication List as of 2020 10:03 PM      CONTINUE these medications which have NOT CHANGED    Details   acetaminophen (TYLENOL) 325 MG tablet Take 2 tablets by mouth every 4 hours as needed for Pain or FeverOTC      gabapentin (NEURONTIN) 300 MG capsule Take 300 mg by mouth 3 times daily. Historical Med      oxyCODONE-acetaminophen (PERCOCET) 5-325 MG per tablet Take 1 tablet by mouth every 6 hours as needed for Pain. Historical Med      hydrochlorothiazide (HYDRODIURIL) 25 MG tablet Take 25 mg by mouth dailyHistorical Med      metoprolol tartrate (LOPRESSOR) 50 MG tablet Take 50 mg by mouth 2 times dailyHistorical Med      alendronate (FOSAMAX) 35 MG tablet Take 35 mg by mouth every 7 daysHistorical Med      RAMIPRIL PO Take 10 mg by mouth daily Historical Med      PRAVASTATIN SODIUM PO Take 80 mg by mouth nightly Historical Med             ALLERGIES     is allergic to dilaudid [hydromorphone hcl]; toradol [ketorolac tromethamine]; and tramadol. FAMILY HISTORY     She indicated that her mother is . She indicated that her father is . She indicated that her sister is alive. She indicated that her brother is .  She indicated that her maternal grandmother is . She indicated that her maternal grandfather is . She indicated that her paternal grandmother is . She indicated that her paternal grandfather is . family history includes Arthritis in her mother; Cancer in her mother; Depression in her brother, mother, and sister; Heart Attack in her mother; Heart Disease in her father; High Blood Pressure in her father; High Cholesterol in her father; Obesity in her brother, father, mother, and sister.     SOCIAL HISTORY       Social History     Socioeconomic History    Marital status:      Spouse name: Not on file    Number of children: Not on file    Years of education: Not on file    Highest education level: Not on file   Occupational History    Not on file   Social Needs    Financial resource strain: Not on file    Food insecurity:     Worry: Not on file     Inability: Not on file    Transportation needs:     Medical: Not on file     Non-medical: Not on file   Tobacco Use    Smoking status: Never Smoker    Smokeless tobacco: Never Used   Substance and Sexual Activity    Alcohol use: No    Drug use: No    Sexual activity: Not Currently   Lifestyle    Physical activity:     Days per week: Not on file     Minutes per session: Not on file    Stress: Not on file   Relationships    Social connections:     Talks on phone: Not on file     Gets together: Not on file     Attends Denominational service: Not on file     Active member of club or organization: Not on file     Attends meetings of clubs or organizations: Not on file     Relationship status: Not on file    Intimate partner violence:     Fear of current or ex partner: Not on file     Emotionally abused: Not on file     Physically abused: Not on file     Forced sexual activity: Not on file   Other Topics Concern    Not on file   Social History Narrative    Not on file       PHYSICAL EXAM     INITIAL VITALS:  height is 5' 5\" (1.651 m) and weight is 280 lb (127 kg). Her oral temperature is 98.5 °F (36.9 °C). Her blood pressure is 138/70 and her pulse is 91. Her respiration is 17 and oxygen saturation is 93%. Physical Exam  Vitals signs and nursing note reviewed. Constitutional:       General: She is not in acute distress. Appearance: Normal appearance. She is well-developed. She is not ill-appearing, toxic-appearing or diaphoretic. HENT:      Head: Normocephalic and atraumatic. Right Ear: External ear normal.      Left Ear: External ear normal.      Nose: Nose normal.      Mouth/Throat:      Mouth: Mucous membranes are moist.      Pharynx: Oropharynx is clear. Eyes:      General: No scleral icterus. Right eye: No discharge. Left eye: No discharge. Conjunctiva/sclera: Conjunctivae normal.      Pupils: Pupils are equal, round, and reactive to light. Neck:      Musculoskeletal: Normal range of motion. Cardiovascular:      Rate and Rhythm: Normal rate. Pulses: Normal pulses. Dorsalis pedis pulses are 2+ on the left side. Posterior tibial pulses are 2+ on the left side. Comments: Capillary refill is less than 3 seconds. Pulmonary:      Effort: Pulmonary effort is normal. No respiratory distress. Abdominal:      General: There is no distension. Palpations: Abdomen is soft. Musculoskeletal:         General: Swelling (mild) and tenderness present. No deformity. Left knee: Normal. She exhibits normal range of motion, no swelling, no effusion, no ecchymosis, no deformity, no laceration, no erythema, normal alignment, no LCL laxity, normal patellar mobility, normal meniscus and no MCL laxity. No tenderness found. Left ankle: She exhibits decreased range of motion and swelling. She exhibits no ecchymosis, no deformity, no laceration and normal pulse. Tenderness. Lateral malleolus tenderness found.  Achilles tendon normal.      Left lower leg: Normal. She exhibits no tenderness, no swelling, no spurring noted. Final report electronically signed by Dr. Ronald Baker on 2/13/2020 9:29 PM      XR ANKLE LEFT (MIN 3 VIEWS)   Final Result   Prior tibiotalar fusion and postsurgical changes. No definite acute fracture. Correlate for point tenderness. **This report has been created using voice recognition software. It may contain minor errors which are inherent in voice recognition technology. **      Final report electronically signed by Dr. Ariana Monroe on 2/13/2020 8:07 PM            LABS:   Labs Reviewed - No data to display    EMERGENCY DEPARTMENT COURSE:   Vitals:    Vitals:    02/13/20 1942   BP: 138/70   Pulse: 91   Resp: 17   Temp: 98.5 °F (36.9 °C)   TempSrc: Oral   SpO2: 93%   Weight: 280 lb (127 kg)   Height: 5' 5\" (1.651 m)     MDM  The patient was seen and evaluated within the ED today with left ankle pain after rolling it. Within the department, I observed the patient's vital signs to be within acceptable range. On exam, I appreciated mild tenderness and edema of the anterior aspect of the left ankle as well as around the left lateral malleolus. The patient has reduced ROM and strength with dorsiflexion and plantar flexion of the left ankle secondary to her pain. There is no noted bruising of the left ankle. There is no tenderness, edema, or bruising of the left foot or left knee. There is no tenderness at the left fibular head. There is intact sensation of soft touch in the LLE. The LLE appears to be neurovascularly intact. Radiologic studies within the department revealed no acute fracture, dislocation, or soft tissue abnormality. There were no signs of postoperative hardware damage on XR. Due to the patient's reported continued pain, and left ankle CT was ordered. CT was negative for acute dislocation or fracture of the left ankle, and her postoperative hardware was intact. I suspect that the patient has sprained her left ankle.     Within the department, the patient was treated with Tylenol followed by [address] [place] [address],[address]. The patient brought a walking boot with her from home, but she declined having it placed on the LLE in this department and asked for an Ace bandage to be placed instead. I observed the patient's condition to improve during the duration of the stay. I explained my proposed course of treatment to the patient, who was amenable to my treatment and discharge decisions. The patient was discharged home in stable condition with prescriptions for Norco, and the patient will return to the ED if the symptoms become more severe in nature or otherwise change. She will otherwise follow up with [facility] for further evaluation and management as needed if her symptoms persist or worsen. CRITICAL CARE:   None    CONSULTS:  None    PROCEDURES:  None    FINAL IMPRESSION     1. Sprain of left ankle, unspecified ligament, initial encounter          DISPOSITION/PLAN   I have given the patient strict written and verbal instructions about care at home, follow-up, and signs and symptoms of worsening of condition, and the patient did verbalize understanding of these instructions. Patient was discharged in stable condition. Will return if symptoms change or worsen, or for any sign or symptom deemed emergent by the patient or family members. Follow up as an outpatient or sooner if symptoms warrant. PATIENT REFERREDTO:  Michel Queen [address]  [address] [facility] [phone]  Call in 3 days  As needed, If symptoms worsen, For re-check      DISCHARGE MEDICATIONS:  Discharge Medication List as of 2/13/2020 10:03 PM      START taking these medications    Details   HYDROcodone-acetaminophen (NORCO) 5-325 MG per tablet Take 1 tablet by mouth every 6 hours as needed for Pain for up to 3 days. Intended supply: 3 days.  Take lowest dose possible to manage pain, Disp-10 tablet, R-0Print             (Please note that portions of this note were completed with a voice recognition program. Efforts were made to edit the dictations but occasionally words are mis-transcribed.)    Scribe:  Kelli Lopez 2/13/20 7:53 PM Scribing for and in the presence of TED King. Signed by: Wayne Vera, 02/15/20 3:50 PM    Provider:  I personally performed the services described in the documentation,reviewed and edited the documentation which was dictated to the scribe in my presence, and it accurately records my words and actions.     Shawn Dean, [facility]  02/15/20 3:50 PM    DANIELE King PA-C  02/15/20 [id number]

## 2020-02-14 NOTE — ED NOTES
ACE wrap applied to pt's affected ankle. Pt intolerable of provided walking boot.       [facility]  02/13/20 [id number]

## 2020-02-28 ENCOUNTER — HOSPITAL ENCOUNTER (OUTPATIENT)
Age: 70
Discharge: HOME | End: 2020-02-28
Payer: MEDICARE

## 2020-02-28 LAB
AVERAGE GLUCOSE: 120 MG/DL (ref 70–126)
HBA1C MFR BLD: 6 % (ref 4.4–6.4)

## 2020-02-28 PROCEDURE — 83036 HEMOGLOBIN GLYCOSYLATED A1C: CPT

## 2020-02-28 PROCEDURE — 36415 COLL VENOUS BLD VENIPUNCTURE: CPT

## 2020-06-04 LAB
AVERAGE GLUCOSE: NORMAL
HBA1C MFR BLD: 5.7 %

## 2020-07-13 ENCOUNTER — OFFICE VISIT (OUTPATIENT)
Dept: INTERNAL MEDICINE CLINIC | Age: 70
End: 2020-07-13
Payer: MEDICARE

## 2020-07-13 PROCEDURE — G0108 DIAB MANAGE TRN  PER INDIV: HCPCS | Performed by: INTERNAL MEDICINE

## 2020-07-13 RX ORDER — BIOTIN 10 MG
2 TABLET ORAL DAILY
COMMUNITY
End: 2022-06-16

## 2020-07-13 RX ORDER — ASPIRIN 81 MG/1
81 TABLET ORAL DAILY
COMMUNITY

## 2020-07-13 RX ORDER — ROSUVASTATIN CALCIUM 20 MG/1
20 TABLET, COATED ORAL DAILY
COMMUNITY
Start: 2020-06-26 | End: 2021-09-27 | Stop reason: ALTCHOICE

## 2020-07-13 NOTE — PROGRESS NOTES
Diabetes Mellitus Type II, Initial Visit:  Dawn Matos [place]   Patient here for an initial evaluation of Type 2 diabetes mellitus. Current symptoms/problems include none. The patient was initially diagnosed with Type 2 diabetes mellitus 2/2020. Known diabetic complications: cardiovascular disease and peripheral vascular disease  Cardiovascular risk factors: advanced age (older than 54 for men, 72 for women), diabetes mellitus, hypertension, obesity (BMI >= 30 kg/m2) and sedentary lifestyle  Current diabetic medications include none. Eye exam current (within one year): yes 6/2020 DAVID SHELTON Dr.  Weight trend: increasing steadily. Has gained 40# since 2104  Prior visit with dietician: no  Current diet: B coffee/ turkey sausage/ english muffin/ occas egg                L sandwich  --often skips               D sims sandwich               Snacking -random               Drinks - water/ coffee/ rare diet soda  Current exercise: taking 8,000 steps per day    Current monitoring regimen: none  Home blood sugar records: none  Any episodes of hypoglycemia? no    Is She on ACE inhibitor or angiotensin II receptor blocker? Yes   ramipril (Altace)    Focus:  Initial visit for diabetes education. Batsheva Oakes is recently diagnosed with Diabetes. She is diet only and is motivated to lose weight and make dietary changes. She has increased exercise efforts. Began instruction on Diabetes Self management. Plan:  Reviewed physiology of Diabetes; BG goals; SGM and timing; carbs; exercise; weight loss  1. Check blood sugars 3 times per week            1 days fasting (waking up) and the other 2 days before a meal --evening meal -- or             Bedtime          Goal   (normal )  2. Keep getting 8,000 steps per day!! Think about a separate strength training               Get your walking done in the morning and afternoon so you can sit do stretching/                  Weight training  3.  Find things you like to do--to avoid going into the kitchen for snacking  4. Don't buy groceries that you know you shouldn't eat  5. The foods with carbohydrates need to be limited                  Try to get 2-3 servings of carbohydrate per day  Dara voiced understanding of above instructions via teach back and willingness to participate in the above plan of care. Time spent in direct contact with patient. Kellie Lao was given a One Touch Verio Reflect glucose meter. She will need a script for glucose test strips to test once daily. One Touch Verio test strips. #100/ 3 months.     [facility]

## 2020-07-13 NOTE — PATIENT INSTRUCTIONS
1. Check blood sugars 3 times per week            1 days fasting (waking up) and the other 2 days before a meal --evening meal -- or             Bedtime          Goal   (normal )  2. Keep getting 8,000 steps per day!! Think about a separate strength training               Get your walking done in the morning and afternoon so you can sit do stretching/                  Weight training  3. Find things you like to do--to avoid going into the kitchen for snacking  4. Don't buy groceries that you know you shouldn't eat  5.  The foods with carbohydrates need to be limited                  Try to get 2-3 servings of carbohydrate per day

## 2020-07-14 VITALS
BODY MASS INDEX: 47.32 KG/M2 | WEIGHT: 284 LBS | SYSTOLIC BLOOD PRESSURE: 138 MMHG | HEART RATE: 68 BPM | HEIGHT: 65 IN | DIASTOLIC BLOOD PRESSURE: 70 MMHG | TEMPERATURE: 97.3 F

## 2020-07-22 ENCOUNTER — OFFICE VISIT (OUTPATIENT)
Dept: INTERNAL MEDICINE CLINIC | Age: 70
End: 2020-07-22
Payer: MEDICARE

## 2020-07-22 ENCOUNTER — TELEPHONE (OUTPATIENT)
Dept: INTERNAL MEDICINE CLINIC | Age: 70
End: 2020-07-22

## 2020-07-22 VITALS — TEMPERATURE: 98.2 F | BODY MASS INDEX: 46.62 KG/M2 | HEIGHT: 65 IN | WEIGHT: 279.8 LBS

## 2020-07-22 PROCEDURE — 97802 MEDICAL NUTRITION INDIV IN: CPT | Performed by: DIETITIAN, REGISTERED

## 2020-07-22 NOTE — TELEPHONE ENCOUNTER
Left msge with provider office to fax most current office visit OR H&P and pertinent lab information - Hgb AIC and lipid profile to the DB center. Fax # provided.

## 2020-07-22 NOTE — PATIENT INSTRUCTIONS
1.)  Read the nutrition facts label for Total carbohydrates and serving size. - Without a food label refer to your carb counting booklet. - Your meal plan is on the 1st page of your Carb counting booklet. We can adjust the meal plan as needed in the future. 2.)  Bring a food log and your meter to next dietitian appt. 3.)  Continue to check your BS 2-3x/week at varying times of the day.   Fasting BS or before a meal, BS goal: 90 - 130  2 hours after the start of a meal, BS goal:  100 - 150

## 2020-07-22 NOTE — PROGRESS NOTES
[address] [place] [address]. [address] [place] [address] Marco., RaduJaye Cruz, [address]  [phone] (phone)  [phone] (fax)    Patient Name: Dionte Patel. Date of Birth: [birth year]. MRN: [id number]      Assessment: Patient is a 78 y.o. female seen for Initial MNT visit for Type II DB.     -Nutritionally relevant labs:   Lab Results   Component Value Date/Time    LABA1C 6.0 02/28/2020 12:31 PM    GLUCOSE 120 (H) 09/09/2019 12:31 PM    GLUCOSE 111 (H) 09/04/2018 10:55 AM    CHOL 164 09/09/2019 12:31 PM    HDL 55 09/09/2019 12:31 PM    LDLCALC 82 09/09/2019 12:31 PM    TRIG 136 09/09/2019 12:31 PM     -Blood sugar trends: New diagnosis of diabetes. AIC 6%. Pt checked her BS 3x since last week since initial DB center appt with Denise Mendoza. - RN-CDE. FBS:  114  Dinner:  107  Bedtime: 78  Currently no DB medication is prescribed. Pt is retired and lives alone. Pt speaks of her 5 Grandchildren often. 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 But now that she is retired she sleeps in until late morning. Pt has wears a cpap but does not like the noise it makes and it wakes her up often and then she takes it off at 730 am and then sleeps until 11 am.  Pt states she sleeps the best during 730 - 11 am.    -Food recall: Food log scanned into EMR  Drinks 16 oz bottled water with pills and will drink 1-2 more bottles of water the rest of the day. Breakfast: (10 - 11 am) Instant plain oatmeal (or flavored and sometimes adds cinnamon), vanilla yogurt, banana. OR 1 whole grain English muffin with butter and 2 sausage patties OR just the English muffin with butter  Lunch: skips. OR rotisserie chicken with cheese, grapes, [place] sweet tea (Pure Leaf brand) OR [employer] meal - Chicken Katerin, [address]  Dinner: (2-3 pm) Eats out almost every day - Ronalee Brash meal or [employer] OR McDonalds  Snacks: not usually.   Pt has followed weight loss programs in the past but then gets too expensive and after losing the weight will gain the weight back. Pt documented steps on her food log. She has a watch the measures her steps each day. Pt finds it difficult to reach 04021 steps/day. Pt is doing arm exercises using an arm apparatus. She tries to do these each day. -Main Beverages: water, coffee in the morning, reg sweetened tea. -Impression of Dietary Intake: on average, 2-3 meals per day, on average, 7-9 dining out or fast food meals per week, high fat/ cholesterol, high sodium, lacking routine intake of fresh fruits and vegetable . Current Outpatient Medications on File Prior to Visit   Medication Sig Dispense Refill    NONFORMULARY Take 1 capsule by mouth daily [employer]      Multiple Vitamins-Minerals (MULTIVITAMIN ADULT) CHEW Take 2 each by mouth daily      rosuvastatin (CRESTOR) 20 MG tablet Take 20 mg by mouth daily      aspirin 81 MG EC tablet Take 81 mg by mouth daily      acetaminophen (TYLENOL) 325 MG tablet Take 2 tablets by mouth every 4 hours as needed for Pain or Fever      hydrochlorothiazide (HYDRODIURIL) 25 MG tablet Take 25 mg by mouth daily      metoprolol tartrate (LOPRESSOR) 50 MG tablet Take 50 mg by mouth 2 times daily      alendronate (FOSAMAX) 35 MG tablet Take 35 mg by mouth every 7 days      RAMIPRIL PO Take 10 mg by mouth daily        No current facility-administered medications on file prior to visit. Vitals from current and previous visits:  Temp 98.2 °F (36.8 °C)   Ht 5' 5\" (1.651 m)   Wt 279 lb 12.8 oz (126.9 kg)   BMI 46.56 kg/m²     -Body mass index is 46.56 kg/m². Greater than 40 - Morbid Obesity / Extreme Obesity / Grade III. -Weight goal: lose weight. Nutrition Diagnosis:   Food and nutrition-related knowledge deficit related to Lack of previous MNT/currently undergoing MNT as evidenced by New diagnosis of Type II DB. Intervention:  -Impression: Pt is not sure about having to check her BS. Explained to get a prescription for strips from her PCP office.    -Instructed the patient on: Carbohydrate Counting, Consistent Carbohydrate Intake, Food Label Reading, Meal Planning for Regular, Balanced Meals & Snacks and The Importance of Regular Physical Activity. Check the nutrition facts label of her vanilla yogurt and to make sure added sugars are <8 gm. How to look up the nutrition facts information of restaurant menu items.    -Handouts given for: carbohydrate counting bookelt, food logging, healthy snacks, fancy plate pictures, 277 gm sample meal plans/menus and weight management tips, How to test your BS. Patient Instructions   1.)  Read the nutrition facts label for Total carbohydrates and serving size. - Without a food label refer to your carb counting booklet. - Your meal plan is on the 1st page of your Carb counting booklet. We can adjust the meal plan as needed in the future. 2.)  Bring a food log and your meter to next dietitian appt. 3.)  Continue to check your BS 2-3x/week at varying times of the day. Fasting BS or before a meal, BS goal: 90 - 130  2 hours after the start of a meal, BS goal:  100 - 150    -Nutrition prescription: 1600 calories/day, 180 g carbs/day. <50 gms fat/day  163# (74 kg)  Comprehension verified using teachback method. Monitoring/Evaluation:   -Followup visit: 8 weeks with dietitian.   -Receptiveness to education/goals: Agreeable.  -Evaluation of education: Indicates understanding.  -Readiness to change: contemplation - ambivalent about change identifying foods containing carbohydrates, and limiting them to ~45 gms/meal.  -Expected compliance: good. Thank you for your referral of this patient. Total time involved in direct patient education: 60 minutes for initial MNT visit.

## 2020-09-07 ENCOUNTER — NURSE ONLY (OUTPATIENT)
Dept: LAB | Age: 70
End: 2020-09-07

## 2020-09-07 LAB
ALBUMIN SERPL-MCNC: 3.8 G/DL (ref 3.5–5.1)
ALP BLD-CCNC: 58 U/L (ref 38–126)
ALT SERPL-CCNC: 20 U/L (ref 11–66)
ANION GAP SERPL CALCULATED.3IONS-SCNC: 9 MEQ/L (ref 8–16)
AST SERPL-CCNC: 20 U/L (ref 5–40)
BACTERIA: ABNORMAL
BASOPHILS # BLD: 0.6 %
BASOPHILS ABSOLUTE: 0 THOU/MM3 (ref 0–0.1)
BILIRUB SERPL-MCNC: 1.2 MG/DL (ref 0.3–1.2)
BILIRUBIN URINE: NEGATIVE
BLOOD, URINE: NEGATIVE
BUN BLDV-MCNC: 12 MG/DL (ref 7–22)
CALCIUM SERPL-MCNC: 10.3 MG/DL (ref 8.5–10.5)
CASTS: ABNORMAL /LPF
CASTS: ABNORMAL /LPF
CHARACTER, URINE: CLEAR
CHLORIDE BLD-SCNC: 103 MEQ/L (ref 98–111)
CHOLESTEROL, TOTAL: 148 MG/DL (ref 100–199)
CO2: 26 MEQ/L (ref 23–33)
COLOR: YELLOW
CREAT SERPL-MCNC: 0.7 MG/DL (ref 0.4–1.2)
CREATININE, URINE: 135.4 MG/DL
CRYSTALS: ABNORMAL
EOSINOPHIL # BLD: 1.3 %
EOSINOPHILS ABSOLUTE: 0.1 THOU/MM3 (ref 0–0.4)
EPITHELIAL CELLS, UA: ABNORMAL /HPF
ERYTHROCYTE [DISTWIDTH] IN BLOOD BY AUTOMATED COUNT: 12.7 % (ref 11.5–14.5)
ERYTHROCYTE [DISTWIDTH] IN BLOOD BY AUTOMATED COUNT: 43.3 FL (ref 35–45)
GFR SERPL CREATININE-BSD FRML MDRD: 83 ML/MIN/1.73M2
GLUCOSE BLD-MCNC: 113 MG/DL (ref 70–108)
GLUCOSE, URINE: NEGATIVE MG/DL
HCT VFR BLD CALC: 46.7 % (ref 37–47)
HDLC SERPL-MCNC: 51 MG/DL
HEMOGLOBIN: 15.6 GM/DL (ref 12–16)
IMMATURE GRANS (ABS): 0.02 THOU/MM3 (ref 0–0.07)
IMMATURE GRANULOCYTES: 0.4 %
KETONES, URINE: NEGATIVE
LDL CHOLESTEROL CALCULATED: 66 MG/DL
LEUKOCYTE EST, POC: ABNORMAL
LYMPHOCYTES # BLD: 28.8 %
LYMPHOCYTES ABSOLUTE: 1.5 THOU/MM3 (ref 1–4.8)
MCH RBC QN AUTO: 31.3 PG (ref 26–33)
MCHC RBC AUTO-ENTMCNC: 33.4 GM/DL (ref 32.2–35.5)
MCV RBC AUTO: 93.6 FL (ref 81–99)
MICROALBUMIN UR-MCNC: < 1.2 MG/DL
MICROALBUMIN/CREAT UR-RTO: 9 MG/G (ref 0–30)
MISCELLANEOUS LAB TEST RESULT: ABNORMAL
MONOCYTES # BLD: 9.3 %
MONOCYTES ABSOLUTE: 0.5 THOU/MM3 (ref 0.4–1.3)
NITRITE, URINE: NEGATIVE
NUCLEATED RED BLOOD CELLS: 0 /100 WBC
PH UA: 6.5 (ref 5–9)
PLATELET # BLD: 189 THOU/MM3 (ref 130–400)
PMV BLD AUTO: 10.4 FL (ref 9.4–12.4)
POTASSIUM SERPL-SCNC: 3.8 MEQ/L (ref 3.5–5.2)
PROTEIN UA: NEGATIVE MG/DL
RBC # BLD: 4.99 MILL/MM3 (ref 4.2–5.4)
RBC URINE: ABNORMAL /HPF
RENAL EPITHELIAL, UA: ABNORMAL
SEG NEUTROPHILS: 59.6 %
SEGMENTED NEUTROPHILS ABSOLUTE COUNT: 3.2 THOU/MM3 (ref 1.8–7.7)
SODIUM BLD-SCNC: 138 MEQ/L (ref 135–145)
SPECIFIC GRAVITY UA: 1.01 (ref 1–1.03)
TOTAL PROTEIN: 6.6 G/DL (ref 6.1–8)
TRIGL SERPL-MCNC: 157 MG/DL (ref 0–199)
UROBILINOGEN, URINE: 1 EU/DL (ref 0–1)
VITAMIN D 25-HYDROXY: 30 NG/ML (ref 30–100)
WBC # BLD: 5.3 THOU/MM3 (ref 4.8–10.8)
WBC UA: ABNORMAL /HPF
YEAST: ABNORMAL

## 2020-09-10 LAB
AVERAGE GLUCOSE: NORMAL
HBA1C MFR BLD: 5.7 %

## 2020-09-23 ENCOUNTER — OFFICE VISIT (OUTPATIENT)
Dept: INTERNAL MEDICINE CLINIC | Age: 70
End: 2020-09-23
Payer: MEDICARE

## 2020-09-23 VITALS — TEMPERATURE: 97.9 F | WEIGHT: 276 LBS | BODY MASS INDEX: 45.98 KG/M2 | HEIGHT: 65 IN

## 2020-09-23 PROCEDURE — 97803 MED NUTRITION INDIV SUBSEQ: CPT | Performed by: DIETITIAN, REGISTERED

## 2020-09-23 NOTE — PROGRESS NOTES
[address] [place] [address]. [address] [place] [address] Marco., [facility], [address]  [phone] (phone)  [phone] (fax)    Patient Name: Chay Bearden. Date of Birth: [birth year]. MRN: [id number]      Assessment: Patient is a 78 y.o. 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     -Nutritionally relevant labs:   Lab Results   Component Value Date/Time    LABA1C 5.7 06/04/2020    LABA1C 6.0 02/28/2020 12:31 PM    GLUCOSE 113 (H) 09/07/2020 10:50 AM    GLUCOSE 120 (H) 09/09/2019 12:31 PM    CHOL 148 09/07/2020 10:50 AM    HDL 51 09/07/2020 10:50 AM    LDLCALC 66 09/07/2020 10:50 AM    TRIG 157 09/07/2020 10:50 AM     -Blood sugar trends: Checking  Once daily at varying times of the day. FBS:  76 - 123, highest 143  Lunch:  109, 138. Pp lunch:  85  Dinner:  102, 135, 107   Pp dinner:  113, 120, 138, 104  Bedtime:  113, 139, 145. Pt lives alone. Tries to go to bed by 1215 am, but often times later 1-2 am    -Food recall: Up 1030 - 11 am  Breakfast: skips OR sometimes has oatmeal and yogurt. Lunch: [place]ian [place] ([facility]) sims branden, cheese sandwich & donut OR cheeseburger with onion OR grilled white fish, hash-browns, broccoli, banana bread (from [employer]) OR 1 hard boiled egg, english muffin with cinn. & honey OR pumpkin spice latte, cheeseburger with onion and pickle OR grilled chicken tenders, spinach and pasta and 1 roll. Dinner: Chicken [place] and french fries OR Captain D's Grilled lemon whitefish, broccoli, green beans OR Chicken quesidilla and Nachoes Mancini Maxx OR chicken noodle soup   Snacks: Usually no evening snack OR one day she had popcorn for evening snack. Other food items listed on food logging - sweets such as Pecan pie, banana nut bread, pbutter pie & donuts. Per pt - has a hx of  Diverticulosis and kidney stones.  But she will still eat nuts in foods and popcorn    Pt relies on eating out or bringing home food since she lives close to eating places here in [facility] [place]. Pt documents her steps - she generally gets in her walking after 7 pm at night.    -Main Beverages: water, coffee, diet coke     -Impression of Dietary Intake: on average, 2 meals per day, on average, 7 dining out or fast food meals per week, low fiber, high fat/ cholesterol, high salt, lacking fresh fruit and veggies in meal and snack planning. Current Outpatient Medications on File Prior to Visit   Medication Sig Dispense Refill    NONFORMULARY Take 1 capsule by mouth daily [facility]      Multiple Vitamins-Minerals (MULTIVITAMIN ADULT) CHEW Take 2 each by mouth daily      rosuvastatin (CRESTOR) 20 MG tablet Take 20 mg by mouth daily      aspirin 81 MG EC tablet Take 81 mg by mouth daily      acetaminophen (TYLENOL) 325 MG tablet Take 2 tablets by mouth every 4 hours as needed for Pain or Fever      hydrochlorothiazide (HYDRODIURIL) 25 MG tablet Take 25 mg by mouth daily      metoprolol tartrate (LOPRESSOR) 50 MG tablet Take 50 mg by mouth 2 times daily      alendronate (FOSAMAX) 35 MG tablet Take 35 mg by mouth every 7 days      RAMIPRIL PO Take 10 mg by mouth daily        No current facility-administered medications on file prior to visit. Vitals from current and previous visits:  Temp 97.9 °F (36.6 °C)   Ht 5' 5\" (1.651 m)   Wt 276 lb (125.2 kg)   BMI 45.93 kg/m²     -Body mass index is 45.93 kg/m². Greater than 40 - Morbid Obesity / Extreme Obesity / Grade III. - Patient lost and 4 pounds over the last 8 weeks. .  -Weight goal: lose weight. Nutrition Diagnosis:   Food and nutrition-related knowledge deficit & Obesity related to currently undergoing MNT as evidenced by Conditions associated with a diagnosis or treatment: Type 2 DB and BMI 45.9. 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 Pt has been retired x 6 years from [employer].   Pt lives alone and has grandchildren who call her late at night knowing she is usually up.      -Instructed the patient on: Healthy Choices When Benigno Fink, Meal Planning for Regular, Balanced Meals & Snacks and looking up the nutrition facts info on dining out foods - Demonstrated how to do this on the computer.     -Handouts given for: roasted vegetable guidelines, Healthier captain D's meal options, Raj Pier PDF of the nutrition facts on their menu options, written nutrition facts for hushpuppies, banana nut bread and sourdough toast.    Patient Instructions   1.)  Meal & Snack times:   1st meal/Brkf:  1030 - 11 am   2nd meal/Lunch:  130 - 2 pm   3rd meal/dinner:  6-7 pm  Snack:  9-10 pm    2.)  Your carb budget/meal - 45-60 gms    Eat 3 meals/day  Your sodium budget/meal = 500 - 600 mg/meal    3.)  Your Fat budget for the Day = <60 gms fat/day    3.)  Balance your meals with salad/cooked veggies or raw veggies    4.)  Opt for a piece of fresh fruit or fruited yogurt as a dessert in place or pie or cookies. 1 cup cut up fresh fruit = 15 gms carbohydrates  Read the label on your yogurt for the total carbohydrates (keep added sugars < 8 gms added sugars)    5.)  Refer to your healthy snack handout given at your last nutritional counseling session. Thanks for the food logging - following the new daily food logging sheets given. Bring again to next dietitian appt. Also bring your meter again too. -Nutrition prescription: 1600 calories/day, 180 g carbs/day. 50 gms fat/day  Adj wt. 163# (74 kg)    Comprehension verified using teachback method. Monitoring/Evaluation:   -Followup visit: 8 weeks with dietitian.   -Receptiveness to education/goals: Agreeable.  -Evaluation of education: Indicates understanding.  -Readiness to change: contemplation - ambivalent about change balancing meals and eating more fresh fruits and vegetables. -Expected compliance: good. Thank you for your referral of this patient. Total time involved in direct patient education: 45 minutes for follow-up MNT visit.

## 2020-09-23 NOTE — PATIENT INSTRUCTIONS
1.)  Meal & Snack times:   1st meal/Brkf:  1030 - 11 am   2nd meal/Lunch:  130 - 2 pm   3rd meal/dinner:  6-7 pm  Snack:  9-10 pm    2.)  Your carb budget/meal - 45-60 gms    Eat 3 meals/day  Your sodium budget/meal = 500 - 600 mg/meal    3.)  Your Fat budget for the Day = <60 gms fat/day    3.)  Balance your meals with salad/cooked veggies or raw veggies    4.)  Opt for a piece of fresh fruit or fruited yogurt as a dessert in place or pie or cookies. 1 cup cut up fresh fruit = 15 gms carbohydrates  Read the label on your yogurt for the total carbohydrates (keep added sugars < 8 gms added sugars)    5.)  Refer to your healthy snack handout given at your last nutritional counseling session. Thanks for the food logging - following the new daily food logging sheets given. Bring again to next dietitian appt. Also bring your meter again too.

## 2020-11-24 ENCOUNTER — OFFICE VISIT (OUTPATIENT)
Dept: INTERNAL MEDICINE CLINIC | Age: 70
End: 2020-11-24
Payer: MEDICARE

## 2020-11-24 VITALS — TEMPERATURE: 97.8 F | WEIGHT: 280.2 LBS | HEIGHT: 65 IN | BODY MASS INDEX: 46.69 KG/M2

## 2020-11-24 PROCEDURE — 97803 MED NUTRITION INDIV SUBSEQ: CPT | Performed by: DIETITIAN, REGISTERED

## 2020-11-24 NOTE — PROGRESS NOTES
Vitals from current and previous visits:  Temp 97.8 °F (36.6 °C)   Ht 5' 5\" (1.651 m)   Wt 280 lb 3.2 oz (127.1 kg)   BMI 46.63 kg/m²     -Body mass index is 46.63 kg/m². Greater than 40 - Morbid Obesity / Extreme Obesity / Grade III. - Patient gained and 4 pounds over the last 3 mo. .  -Weight goal: lose weight. Nutrition Diagnosis:   Obesity related to excessive energy intake & sedentary lifestyle & Currently undergoing MNT as evidenced by Weight gain of 3# and patient report of not exercising as planned and still making high calorie menu choices when eating out         Intervention:  -Impression: Was doing the fat/calorie food logging for a while but got tired of doing this. Pt talks about her grandchildren often and most of them are grown young adults.    -Instructed the patient on: (see below patient instructions) & Meal Planning for Regular, Balanced Meals & Snacks and The Importance of Regular Physical Activity. Not to drink Bel-Nor sweet tea - choose sugar free drink alternatives, lower fat menu options when eating out, how to use the [employer] for food logging, how to lookup the nutrition facts info of fast foods,     -Handouts given for: fast food dining. Sit less for your healthy, your home gym is open, stretches, on the move for older adults. Patient Instructions   1.)  Use the [email or web address] and set up an account. Bring your username and password to next dietitian appt. So dietitian can print your food logging.    2.)  Your meal plan:  1500 calories/day  <55 gms fat/day  45-60 gms carbohydrates/meal.    3.)  Include veggies with each meal and for snacks  - pre-prep veggies for the week. 4.)  Have a fresh fruit serving 1-2/day.    5.)  Oatmeal and yogurt is good for a breakfast.    6.)  Do exercises everyday. Exercise may help to prevent you from wanting something to eat. Drink 64 ounces of water/day.    7.)  Limit eating to no more than 3x/week.     -Nutrition prescription:

## 2020-11-30 ENCOUNTER — TELEPHONE (OUTPATIENT)
Dept: INTERNAL MEDICINE CLINIC | Age: 70
End: 2020-11-30

## 2020-11-30 NOTE — TELEPHONE ENCOUNTER
Call to KATT Lomax, [facility] office from Outpatient dietitian. Pt stated she had a Hgb AIC done at provider office, left msge to fax most current Hgb AIC result, fax # provided for the outpatient dietitian office. Also, patient mentioned that your office may not be receiving nutrition counseling visit notes from our office and if so to please let our office know, office phone# and office hours given. Thanks.

## 2021-01-25 ENCOUNTER — OFFICE VISIT (OUTPATIENT)
Dept: INTERNAL MEDICINE CLINIC | Age: 71
End: 2021-01-25
Payer: MEDICARE

## 2021-01-25 VITALS — TEMPERATURE: 97 F | BODY MASS INDEX: 46.48 KG/M2 | WEIGHT: 279 LBS | HEIGHT: 65 IN

## 2021-01-25 DIAGNOSIS — E11.9 DIABETES MELLITUS WITH NO COMPLICATION (HCC): Primary | ICD-10-CM

## 2021-01-25 LAB — HBA1C MFR BLD: 5.9 % (ref 4.3–5.7)

## 2021-01-25 PROCEDURE — 97803 MED NUTRITION INDIV SUBSEQ: CPT | Performed by: DIETITIAN, REGISTERED

## 2021-01-25 PROCEDURE — 83036 HEMOGLOBIN GLYCOSYLATED A1C: CPT | Performed by: DIETITIAN, REGISTERED

## 2021-01-25 NOTE — PATIENT INSTRUCTIONS
1.)  No nuts or seeds or popcorn with diverticulosis. - soft seeds in a tomato or strawberries     2.)  Keep working on planning in fresh fruit and veggies at meals and snacks. - Strive to have 1 fresh fruit serving/day then work up from there. - Strive for 1 veggie serving/day then work up from there. 3.)  When you feel like you want something to eat when you have already had enough to eat - drink a glass of water. - Get busy on a hobby or take a walk. 4.)  Read inspirational reading  - when feeling depressed or down. 5.)  Good job getting on your Qianmi -   Make sure I have UN and PW correct  UN:  \"p_a_natiston\"    PW: \"[id number]=\"    6.)  Select lean and healthy menu options when dining out  - if still hungry when home after eating out - make sure you drink water and can always have a fresh fruit for a snack. 7.)  Get active each day - your phone measures your steps so use that as a gauge to increasing your steps as able.

## 2021-01-25 NOTE — PROGRESS NOTES
- Strive to have 1 fresh fruit serving/day then work up from there. - Strive for 1 veggie serving/day then work up from there. 3.)  When you feel like you want something to eat when you have already had enough to eat - drink a glass of water. - Get busy on a hobby or take a walk. 4.)  Read inspirational reading  - when feeling depressed or down. 5.)  Good job getting on your [email or web address] -   Make sure I have UN and PW correct  UN:  \"p_a_rolston\"    PW: \"[id number]=\"    6.)  Select lean and healthy menu options when dining out  - if still hungry when home after eating out - make sure you drink water and can always have a fresh fruit for a snack.    -Nutrition prescription: 1500 calories/day, 150 - 165 g carbs/day. <50 gms fat/day    Comprehension verified using teachback method. Monitoring/Evaluation:   -Followup visit: 8 weeks with dietitian.   -Receptiveness to education/goals: Agreeable.  -Evaluation of education: Indicates understanding.  -Readiness to change: precontemplative- eating more fresh fruits and veggies, choosing lower fat healthy menu options when eating out, Action - using the [employer].  -Expected compliance: fair. Thank you for your referral of this patient. Total time involved in direct patient education: 45 minutes for follow-up MNT visit.

## 2021-02-01 ENCOUNTER — HOSPITAL ENCOUNTER (OUTPATIENT)
Dept: WOMENS IMAGING | Age: 71
Discharge: HOME | End: 2021-02-01
Payer: MEDICARE

## 2021-02-01 DIAGNOSIS — Z12.31 VISIT FOR SCREENING MAMMOGRAM: ICD-10-CM

## 2021-02-01 PROCEDURE — 77063 BREAST TOMOSYNTHESIS BI: CPT

## 2021-03-22 LAB
AVERAGE GLUCOSE: NORMAL
HBA1C MFR BLD: 5.8 %

## 2021-03-23 ENCOUNTER — OFFICE VISIT (OUTPATIENT)
Dept: INTERNAL MEDICINE CLINIC | Age: 71
End: 2021-03-23
Payer: MEDICARE

## 2021-03-23 VITALS — TEMPERATURE: 97.1 F | HEIGHT: 65 IN | WEIGHT: 280.6 LBS | BODY MASS INDEX: 46.75 KG/M2

## 2021-03-23 DIAGNOSIS — E11.9 TYPE 2 DIABETES MELLITUS WITHOUT COMPLICATION, WITHOUT LONG-TERM CURRENT USE OF INSULIN (HCC): ICD-10-CM

## 2021-03-23 PROCEDURE — 97803 MED NUTRITION INDIV SUBSEQ: CPT | Performed by: DIETITIAN, REGISTERED

## 2021-03-23 NOTE — PROGRESS NOTES
[address] [place] [address]. [address] [place] [address] Marco., RaduJaye [facility], [facility]  [phone] (phone)  [phone] (fax)    Patient Name: Fide Ma Date of Birth: [birth year]. MRN: [id number]      Assessment: Patient is a 69 y.o. 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     -Nutritionally relevant labs:   Lab Results   Component Value Date/Time    LABA1C 5.9 (H) 01/25/2021 01:40 PM    LABA1C 5.7 09/10/2020    LABA1C 5.7 06/04/2020    LABA1C 6.0 02/28/2020 12:31 PM    GLUCOSE 110 (H) 10/13/2020 11:42 AM    GLUCOSE 113 (H) 09/07/2020 10:50 AM    CHOL 176 10/13/2020 11:42 AM    HDL 48 10/13/2020 11:42 AM    LDLCALC 96 10/13/2020 11:42 AM    TRIG 162 10/13/2020 11:42 AM     Hgb AIC 3/22/21 @ Tae Hernandez's office - 5.8%    Up late. Eats 2 meals/day. -Food recall - from phone myfitnesspal:   Breakfast: 4 sausage strips OR Just coffee with 35 ran natural bliss creamer OR cottage cheese 1/2 cup, 1 english muffin with olive oil and sea salt, Strawberry natural spread. Special K with lite milk @ brkf sometimes. Or when busy. Lunch/dinner: 4 grilled chicken tenderloins with seasoning, 1/2 hash brown casserole, fried apples, 1 small dinner roll, raw veggies. Dinner: Lunch or dinner [place] yogurt, blueberries, banana small, craisians, granola. Snacks: not usually. Pt admits to eating d/t boredom. -Main Beverages: water     -Impression of Dietary Intake: on average, 2 meals per day, high fat/ cholesterol, lacking routine intake of vegetables and fresh fruit, eating out several times a week.     Current Outpatient Medications on File Prior to Visit   Medication Sig Dispense Refill    NONFORMULARY Take 1 capsule by mouth daily [facility]      Multiple Vitamins-Minerals (MULTIVITAMIN ADULT) CHEW Take 2 each by mouth daily      rosuvastatin (CRESTOR) 20 MG tablet Take 20 mg by mouth daily      aspirin 81 MG EC tablet Take 81 mg by mouth daily      acetaminophen (TYLENOL) 325 MG tablet Take 2 tablets by mouth every 4 hours as needed for Pain or Fever      hydrochlorothiazide (HYDRODIURIL) 25 MG tablet Take 25 mg by mouth daily      metoprolol tartrate (LOPRESSOR) 50 MG tablet Take 50 mg by mouth 2 times daily      alendronate (FOSAMAX) 35 MG tablet Take 35 mg by mouth every 7 days      RAMIPRIL PO Take 10 mg by mouth daily        No current facility-administered medications on file prior to visit. Vitals from current and previous visits:  Temp 97.1 °F (36.2 °C)   Ht 5' 5\" (1.651 m)   Wt 280 lb 9.6 oz (127.3 kg)   BMI 46.69 kg/m²     -Body mass index is 46.69 kg/m². Greater than 40 - Morbid Obesity / Extreme Obesity / Grade III. - Patient maintained.  -Weight goal: lose weight. Nutrition Diagnosis:   Obesity related to Trying to learn/practice nutrition recommendations and sedentary and currently undergoing MNT as evidenced by Body mass index is 46.69 kg/m². and Dx of diabetes         Intervention:  -Impression:     -Instructed the patient on: Meal Planning for Regular, Balanced Meals & Snacks and The Importance of Regular Physical Activity.  -Handouts given for: Pretty Gómez, power up with breakfast, snack ideas, 400 ran lunch ideas, how to start cooking healthy. Patient Instructions   1.)  No nuts or seeds or popcorn with diverticulosis. - Ok to have soft seeds in a tomato or strawberries      2.)  Keep working on planning in fresh fruit and veggies at meals and snacks. - Strive to have 1 fresh fruit serving/day then work up from there. - Strive for 1 veggie serving/day then work up from there.     3.)  When you feel like you want something to eat when you have already had enough to eat - drink a glass of water. - Get busy on a hobby or take a walk.     4.)  Read inspirational reading  - when feeling depressed or down.     5.)  Good job getting on your "[employer] -   Make sure I have [address] and PW correct so I can print your food logging. UN:  \"p_a_jaja\"  PW: \"[id number]=\"    6.)  Select lean and healthy menu options when dining out  - if still hungry when home after eating out - make sure you drink water and can always have a fresh fruit for a snack.     -Nutrition prescription: 1500 calories/day & <50 gms fat/day.       Comprehension verified using teachback method. Monitoring/Evaluation:   -Followup visit: 8 weeks with dietitian.   -Receptiveness to education/goals: Agreeable.  -Evaluation of education: Indicates understanding.  -Readiness to change: contemplation - ambivalent about change eating more fresh fruit and vegetables, preparing more meals at home. -Expected compliance: fair. Thank you for your referral of this patient. Total time involved in direct patient education: 30 minutes for follow-up MNT visit.

## 2021-05-26 ENCOUNTER — OFFICE VISIT (OUTPATIENT)
Dept: INTERNAL MEDICINE CLINIC | Age: 71
End: 2021-05-26
Payer: MEDICARE

## 2021-05-26 VITALS — HEIGHT: 65 IN | TEMPERATURE: 97.3 F | BODY MASS INDEX: 46.65 KG/M2 | WEIGHT: 280 LBS

## 2021-05-26 DIAGNOSIS — E11.9 TYPE 2 DIABETES MELLITUS WITHOUT COMPLICATION, WITHOUT LONG-TERM CURRENT USE OF INSULIN (HCC): ICD-10-CM

## 2021-05-26 PROCEDURE — 97803 MED NUTRITION INDIV SUBSEQ: CPT | Performed by: DIETITIAN, REGISTERED

## 2021-05-26 NOTE — PROGRESS NOTES
[address] [place] [address]. [address] [place] [address] Marco., [facility], [address]  [phone] (phone)  [phone] (fax)    Patient Name: Elvis Kuo. Date of Birth: . MRN: [id number]      Assessment: Patient is a 69 y.o. 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     -Nutritionally relevant labs:   Lab Results   Component Value Date/Time    LABA1C 5.8 2021 12:00 AM    LABA1C 5.9 (H) 2021 01:40 PM    LABA1C 5.7 09/10/2020 12:00 AM    LABA1C 5.7 2020 12:00 AM    GLUCOSE 110 (H) 10/13/2020 11:42 AM    GLUCOSE 113 (H) 2020 10:50 AM    CHOL 176 10/13/2020 11:42 AM    HDL 48 10/13/2020 11:42 AM    LDLCALC 96 10/13/2020 11:42 AM    TRIG 162 10/13/2020 11:42 AM     -Blood sugar trends: Pt brings in written documentation of her BS's. Checking once daily at various times of the day. FBS:  80 - 121  Lunch/dinner:  102-130  Evenin - 165    -Food recall: Pt does the [employer] for food tracking but only can print a few days off to review, when she insists she uses it everyday. Breakfast: skips OR Flatbread subway sandwich OR Chicken Teriaki sub from subway, 1 cup green beans, diet orange drink  Lunch: 1 cup orange juice & veggie omelette OR 1 cup skillet lasagna OR Subway Chicken Teriaki sandwich, diet dr pepper OR 1 cup orange juice, 1-6\"pancake, 3 sausage links. Dinner: 1/5 of a [employer] OR 6\" flatbread pizza - BBQ chicken OR Teryaki chicken sub with diet dr torres OR Lee Golden, 1 can of green beans OR Skillet Lasagna & green beans  Snacks: Ana Justin - pt states not drinking this anymore. Chili Cheese Fritoes, Apple     Doing arm exercises and steps and gazell. Brought in documentation of her daily steps    -Main Beverages: Diet pop, water.  Occ regular pop.    -Impression of Dietary Intake: on average, 2-3 meals per day, on average, several times fast food meals per week, laccking routine intake of fresh fruit and reviewed many of these for inspiration on getting in more fresh fruits and vegetables and plateau busting. Calorie/fat gram counting food logs. Patient Instructions   1.)  Fasting BS or before a meal (if it has been 4 hours since you last ate), BS goal:  90 - 130  2 hours after the start of a meal, BS goal:  100 - 150    2.)  Keep working in those fresh fruits and vegetables with meals and snacks. 3.)  Bump up your exercise (steps) as able. Strive to walk 6000 - 7000 steps/day  - Plus get up from sitting often during the day. 4.)  Good job doing your food logging.  - Your calorie budget/day = 1400 - 1500 calories/day  - Your fat budget/day = <40 - 50 gms fat/day    Transfer your myfitnesspal Food logging onto the calorie fat gram food logs and bring 1 week to the next dietitian appt. -Nutrition prescription: 1400 - 1500 calories/day, <50 gms fat/day. Comprehension verified using teachback method. Monitoring/Evaluation:   -Followup visit: 3 mo with dietitian.   -Receptiveness to education/goals: Agreeable.  -Evaluation of education: Indicates understanding.  -Readiness to change: precontemplative- progressing her activity of her steps, working in more fresh fruits and vegetables. -Expected compliance: fair. Thank you for your referral of this patient. Total time involved in direct patient education: 45 minutes for follow-up MNT visit.

## 2021-08-25 ENCOUNTER — OFFICE VISIT (OUTPATIENT)
Dept: INTERNAL MEDICINE CLINIC | Age: 71
End: 2021-08-25
Payer: MEDICARE

## 2021-08-25 VITALS — HEIGHT: 65 IN | BODY MASS INDEX: 46.65 KG/M2 | WEIGHT: 280 LBS | TEMPERATURE: 98 F

## 2021-08-25 DIAGNOSIS — E11.9 TYPE 2 DIABETES MELLITUS WITHOUT COMPLICATION, WITHOUT LONG-TERM CURRENT USE OF INSULIN (HCC): ICD-10-CM

## 2021-08-25 PROCEDURE — 97803 MED NUTRITION INDIV SUBSEQ: CPT | Performed by: DIETITIAN, REGISTERED

## 2021-08-25 RX ORDER — ATORVASTATIN CALCIUM 10 MG/1
10 TABLET, FILM COATED ORAL DAILY
COMMUNITY
End: 2021-09-27 | Stop reason: DRUGHIGH

## 2021-08-25 NOTE — PATIENT INSTRUCTIONS
Good job doing your food logging. Shows you where to adjust when ordering food from restaurants. - Good job adding salads and on days you have breakfast at home. Continue working in fresh fruit and veggies. Bump up your steps each day to try to get 5000 steps/day. - Try you tube exercises. Work up to 30 minutes of physical activity each day. Drink enough water - good idea to try hit 64 ounces of water/day. Sugar free caffeine free beverages can count toward your fluid intake.

## 2021-08-25 NOTE — PROGRESS NOTES
[address] [place] [address]. [address] [place] [address] Marco., [facility], [facility]  [phone] (phone)  [phone] (fax)    Patient Name: Alanis Zavala. Date of Birth: [birth year]. MRN: [id number]      Assessment: Patient is a 69 y.o. 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     -Nutritionally relevant labs:   Lab Results   Component Value Date/Time    LABA1C 5.8 03/22/2021 12:00 AM    LABA1C 5.9 (H) 01/25/2021 01:40 PM    LABA1C 5.7 09/10/2020 12:00 AM    LABA1C 5.7 06/04/2020 12:00 AM    GLUCOSE 110 (H) 10/13/2020 11:42 AM    GLUCOSE 113 (H) 09/07/2020 10:50 AM    CHOL 176 10/13/2020 11:42 AM    HDL 48 10/13/2020 11:42 AM    LDLCALC 96 10/13/2020 11:42 AM    TRIG 162 10/13/2020 11:42 AM     -Blood sugar trends: Forgot her meter. Checks ~ 3x/week (morning/afternoon/evening)  ~ 100 BS average, Highest 136    -Food recall/log (fat and calorie food logging. Plus pt uses myfitnesspal):   Reviewed her food logging   Up ~ 9-10   Has Decaff coffee with creamer  Skips brkf usually  1st meal ~ 130 pm - chipotle chicken wrap, honey crueller donut, tod macchiato drink from [address] egg and cheese Bagel from [employer] or [employer]   2nd meal:  ~ 530 pm - popcorn OR Chicken Maxwell ranch pizza and  salad   Snacks: 2 subway cookies    -Main Beverages: some water but not 64 ounces/day.       -Impression of Dietary Intake: on average, 2-3 meals per day, on average, 7-14 fast food meals per week, low fiber, high fat/ cholesterol.    -  Current Outpatient Medications on File Prior to Visit   Medication Sig Dispense Refill    atorvastatin (LIPITOR) 10 MG tablet Take 10 mg by mouth daily Unsure of dosage      NONFORMULARY Take 1 capsule by mouth daily [facility]      Multiple Vitamins-Minerals (MULTIVITAMIN ADULT) CHEW Take 2 each by mouth daily      aspirin 81 MG EC tablet Take 81 mg by mouth daily      acetaminophen (TYLENOL) 325 MG tablet Take 2 tablets by toward your fluid intake.      -Nutrition prescription: 1400 - 1500 calories/day, 150 g carbs/day. <55 gms fat/day    Comprehension verified using teachback method. Monitoring/Evaluation:   -Followup visit: 5 mo with dietitian.   -Receptiveness to education/goals: Agreeable.  -Evaluation of education: Indicates understanding.  -Readiness to change: precontemplative- modifying her food choices when eating out to have less fat and calories. -Expected compliance: fair. Thank you for your referral of this patient. Total time involved in direct patient education: 30 minutes for follow-up MNT visit.

## 2021-09-21 LAB
AVERAGE GLUCOSE: NORMAL
HBA1C MFR BLD: 5.9 %

## 2021-09-27 ENCOUNTER — OFFICE VISIT (OUTPATIENT)
Dept: INTERNAL MEDICINE CLINIC | Age: 71
End: 2021-09-27
Payer: MEDICARE

## 2021-09-27 VITALS
TEMPERATURE: 97 F | DIASTOLIC BLOOD PRESSURE: 72 MMHG | HEIGHT: 65 IN | WEIGHT: 280.4 LBS | HEART RATE: 56 BPM | SYSTOLIC BLOOD PRESSURE: 110 MMHG | BODY MASS INDEX: 46.72 KG/M2

## 2021-09-27 DIAGNOSIS — E11.9 TYPE 2 DIABETES MELLITUS WITHOUT COMPLICATION, WITHOUT LONG-TERM CURRENT USE OF INSULIN (HCC): ICD-10-CM

## 2021-09-27 PROCEDURE — G0108 DIAB MANAGE TRN  PER INDIV: HCPCS | Performed by: INTERNAL MEDICINE

## 2021-09-27 RX ORDER — ATORVASTATIN CALCIUM 40 MG/1
40 TABLET, FILM COATED ORAL DAILY
COMMUNITY

## 2021-09-27 NOTE — PATIENT INSTRUCTIONS
Increase activity levelss to 10-15 minutes 2 times per week    --walking as tolerated    --Gaxelle    --Total gym    --Lion Farah 10 minute chair workout    --stretch bands  Watch how much junk food you eat in the evening              -limit a cookie to 1 time per week or less             Try to get veggies every lunch and dinner          Think about veggies for bedtime snacking  Check with your pharmacist about the supplements you are considering             -make sure they can do no harm or interact with prescription                            Medicines  Focus on weight loss efforts! Blood sugar goals  before meals.  Under 150 2 hours after a meal.

## 2021-09-27 NOTE — PROGRESS NOTES
[facility]  [address] [place], [address]  [phone] (phone)  [phone] (fax)    Patient ID: Cornel Phoenix [id number]  Referring Provider: She COYNE     Patient's name and  were verified. Subjective:    She presents for Her follow-up diabetic visit. She has type 2 diabetes mellitus. Home regimen includes: diet only. She is noncompliant some of the time. Assessment:     Lab Results   Component Value Date    LABA1C 5.8 2021    BUN 16 10/13/2020    CREATININE 0.6 10/13/2020     There were no vitals filed for this visit. Wt Readings from Last 3 Encounters:   21 280 lb (127 kg)   21 280 lb (127 kg)   21 280 lb 9.6 oz (127.3 kg)     Ht Readings from Last 3 Encounters:   21 5' 5\" (1.651 m)   21 5' 5\" (1.651 m)   21 5' 5\" (1.651 m)       Current monitoring regimen: home blood tests - 4 times daily  Home blood sugar trends: AC and 2 hours pp meals                            BS's   Any episodes of hypoglycemia? no  Depression screening completed none  Previous visit with dietician: yes - 21  Current diet: B skips                       L 11am-2pm fruit/ english muffin/ turkey                                     Sausage/ eggs                           Grilled chicken/ m potato/ gravy                       D snacks subway with  cookies/ oreos                                      Chips                               Home--chicken patties                       Drinks- coffee, water  Current exercise: walking 8,000 steps per day. Knees hurts   Eye exam current (within one year): yes 2021 [address] Dr. Paloma Montelongo for eye lid repair  Any history of foot problems? no  Last foot exam: 21 Pedal pulses:   peripheral pulses symmetrical   Results of monofilament test: 10/10. Diminished 4th/5th toes right foot              Skin noted to be warm, pale and hair is absent.   Immunizations up to date: yes -   Taking ASA: Yes   Appropriate for use of MyChart Glucose Grid:  No    Focus:     Diabetes education. Recent A1C reported to be 5.9%. Glucose levels remain in goal. Kisha Marquez is not losing weight. Activity is limited r/t pain in knees- we discussed other options to walking for exercise. It is important to burn more calories than eating. She is interested in several different 'supplement' weight loss aids. Discussed that these are not truly tested and claims may be false. Emphasized safety risk and to review planned supplements with her pharmacist to ensure safety with prescription meds. Again encouraged more exercise and less calorie intake. She follows with RD again in 4 months. DSME PLAN:   Discussed general issues about diabetes pathophysiology and management. Counseling at today's visit: BG goals; carbs; exercise; safety with supplements. Increase activity levelss to 10-15 minutes 2 times per week    --walking as tolerated    --Gaxelle    --Total gym    --[place] 10 minute chair workout    --stretch bands  Watch how much junk food you eat in the evening              -limit a cookie to 1 time per week or less             Try to get veggies every lunch and dinner          Think about veggies for bedtime snacking  Check with your pharmacist about the supplements you are considering             -make sure they can do no harm or interact with prescription                            Medicines  Focus on weight loss efforts! Blood sugar goals  before meals. Under 150 2 hours after a meal.  Meter download, medications, PMH and nursing assessment reviewed. Mary Purcell states She is willing to participate in this plan of care and verbalized understanding of all instructions provided. Teach back used to verify comprehension. Total time involved in direct patient education: 60 minutes.

## 2021-09-30 ENCOUNTER — TELEPHONE (OUTPATIENT)
Dept: INTERNAL MEDICINE CLINIC | Age: 71
End: 2021-09-30

## 2021-09-30 NOTE — TELEPHONE ENCOUNTER
Myrtle Million called in and stated that she called the insurance company and Trulicity will be covered. She would like to try that medication out. She also stated that she spoke with Dr. Cammy Conde and he said it would be ok for her to use. He is her heart doctor and always ok's any medications with him.  Thank you

## 2021-10-04 NOTE — TELEPHONE ENCOUNTER
Roseanne Medrano would like to try Trulicity therapy for assistance in glucose control and potential assistance with weight loss efforts. If you agree with this approach, please consider sending a a prescription to [facility],  [facility]. Trulicity 9.94DC once weekly. 4 pens. Refills__    Thank you!     Taiwo Mills RN, CDE

## 2021-10-06 ENCOUNTER — NURSE ONLY (OUTPATIENT)
Dept: LAB | Age: 71
End: 2021-10-06

## 2021-10-06 LAB
ALBUMIN SERPL-MCNC: 4 G/DL (ref 3.5–5.1)
ALP BLD-CCNC: 62 U/L (ref 38–126)
ALT SERPL-CCNC: 20 U/L (ref 11–66)
ANION GAP SERPL CALCULATED.3IONS-SCNC: 8 MEQ/L (ref 8–16)
AST SERPL-CCNC: 19 U/L (ref 5–40)
BACTERIA: ABNORMAL
BASOPHILS # BLD: 0.8 %
BASOPHILS ABSOLUTE: 0 THOU/MM3 (ref 0–0.1)
BILIRUB SERPL-MCNC: 1 MG/DL (ref 0.3–1.2)
BILIRUBIN URINE: NEGATIVE
BLOOD, URINE: NEGATIVE
BUN BLDV-MCNC: 16 MG/DL (ref 7–22)
CALCIUM SERPL-MCNC: 10.1 MG/DL (ref 8.5–10.5)
CASTS: ABNORMAL /LPF
CASTS: ABNORMAL /LPF
CHARACTER, URINE: CLEAR
CHLORIDE BLD-SCNC: 100 MEQ/L (ref 98–111)
CHOLESTEROL, FASTING: 188 MG/DL (ref 100–199)
CO2: 29 MEQ/L (ref 23–33)
COLOR: YELLOW
CREAT SERPL-MCNC: 0.7 MG/DL (ref 0.4–1.2)
CREATININE, URINE: 80.2 MG/DL
CRYSTALS: ABNORMAL
EOSINOPHIL # BLD: 2.4 %
EOSINOPHILS ABSOLUTE: 0.1 THOU/MM3 (ref 0–0.4)
EPITHELIAL CELLS, UA: ABNORMAL /HPF
ERYTHROCYTE [DISTWIDTH] IN BLOOD BY AUTOMATED COUNT: 12.7 % (ref 11.5–14.5)
ERYTHROCYTE [DISTWIDTH] IN BLOOD BY AUTOMATED COUNT: 44.9 FL (ref 35–45)
GFR SERPL CREATININE-BSD FRML MDRD: 82 ML/MIN/1.73M2
GLUCOSE BLD-MCNC: 109 MG/DL (ref 70–108)
GLUCOSE, URINE: NEGATIVE MG/DL
HCT VFR BLD CALC: 47.3 % (ref 37–47)
HDLC SERPL-MCNC: 47 MG/DL
HEMOGLOBIN: 15.5 GM/DL (ref 12–16)
IMMATURE GRANS (ABS): 0.03 THOU/MM3 (ref 0–0.07)
IMMATURE GRANULOCYTES: 0.6 %
KETONES, URINE: NEGATIVE
LDL CHOLESTEROL CALCULATED: 105 MG/DL
LEUKOCYTE EST, POC: ABNORMAL
LYMPHOCYTES # BLD: 36.2 %
LYMPHOCYTES ABSOLUTE: 1.8 THOU/MM3 (ref 1–4.8)
MCH RBC QN AUTO: 31.4 PG (ref 26–33)
MCHC RBC AUTO-ENTMCNC: 32.8 GM/DL (ref 32.2–35.5)
MCV RBC AUTO: 95.9 FL (ref 81–99)
MICROALBUMIN UR-MCNC: < 1.2 MG/DL
MICROALBUMIN/CREAT UR-RTO: 15 MG/G (ref 0–30)
MISCELLANEOUS LAB TEST RESULT: ABNORMAL
MONOCYTES # BLD: 10.5 %
MONOCYTES ABSOLUTE: 0.5 THOU/MM3 (ref 0.4–1.3)
NITRITE, URINE: NEGATIVE
NUCLEATED RED BLOOD CELLS: 0 /100 WBC
PH UA: 7.5 (ref 5–9)
PLATELET # BLD: 210 THOU/MM3 (ref 130–400)
PMV BLD AUTO: 10.2 FL (ref 9.4–12.4)
POTASSIUM SERPL-SCNC: 4.5 MEQ/L (ref 3.5–5.2)
PROTEIN UA: NEGATIVE MG/DL
RBC # BLD: 4.93 MILL/MM3 (ref 4.2–5.4)
RBC URINE: ABNORMAL /HPF
RENAL EPITHELIAL, UA: ABNORMAL
SEG NEUTROPHILS: 49.5 %
SEGMENTED NEUTROPHILS ABSOLUTE COUNT: 2.5 THOU/MM3 (ref 1.8–7.7)
SODIUM BLD-SCNC: 137 MEQ/L (ref 135–145)
SPECIFIC GRAVITY UA: 1.01 (ref 1–1.03)
T4 FREE: 1.25 NG/DL (ref 0.93–1.76)
TOTAL PROTEIN: 6.3 G/DL (ref 6.1–8)
TRIGLYCERIDE, FASTING: 182 MG/DL (ref 0–199)
TSH SERPL DL<=0.05 MIU/L-ACNC: 3.57 UIU/ML (ref 0.4–4.2)
UROBILINOGEN, URINE: 1 EU/DL (ref 0–1)
WBC # BLD: 5 THOU/MM3 (ref 4.8–10.8)
WBC UA: ABNORMAL /HPF
YEAST: ABNORMAL

## 2021-10-20 NOTE — TELEPHONE ENCOUNTER
Call to [facility]. Trulicity 0.16WZ ordered once weekly. She has taken the first dose with no issues. Encouraged to call the clinic with any questions.

## 2021-12-21 ENCOUNTER — HOSPITAL ENCOUNTER (EMERGENCY)
Age: 71
Discharge: HOME | End: 2021-12-21
Payer: MEDICARE

## 2021-12-21 ENCOUNTER — APPOINTMENT (OUTPATIENT)
Dept: GENERAL RADIOLOGY | Age: 71
End: 2021-12-21
Payer: MEDICARE

## 2021-12-21 VITALS
WEIGHT: 272 LBS | BODY MASS INDEX: 45.32 KG/M2 | HEART RATE: 97 BPM | SYSTOLIC BLOOD PRESSURE: 122 MMHG | RESPIRATION RATE: 20 BRPM | DIASTOLIC BLOOD PRESSURE: 72 MMHG | OXYGEN SATURATION: 93 % | HEIGHT: 65 IN | TEMPERATURE: 98.6 F

## 2021-12-21 DIAGNOSIS — D70.9 NEUTROPENIA, UNSPECIFIED TYPE (HCC): ICD-10-CM

## 2021-12-21 DIAGNOSIS — U07.1 COVID-19: Primary | ICD-10-CM

## 2021-12-21 LAB
BASOPHILS # BLD: 0.4 %
BASOPHILS ABSOLUTE: 0 THOU/MM3 (ref 0–0.1)
BILIRUBIN URINE: NEGATIVE
BLOOD, URINE: NEGATIVE
BUN WHOLE BLOOD, UC: 21 MG/DL (ref 8–26)
CHARACTER, URINE: CLEAR
CHLORIDE, WHOLE BLOOD: 99 MEQ/L (ref 98–109)
CO2, WHOLE BLOOD: 27 MEQ/L (ref 23–33)
COLOR: YELLOW
CREATININE WHOLE BLOOD, UC: 1 MG/DL (ref 0.5–1.2)
EKG ATRIAL RATE: 94 BPM
EKG P AXIS: 86 DEGREES
EKG P-R INTERVAL: 152 MS
EKG Q-T INTERVAL: 346 MS
EKG QRS DURATION: 74 MS
EKG QTC CALCULATION (BAZETT): 432 MS
EKG R AXIS: 25 DEGREES
EKG T AXIS: 51 DEGREES
EKG VENTRICULAR RATE: 94 BPM
EOSINOPHIL # BLD: 0.4 %
EOSINOPHILS ABSOLUTE: 0 THOU/MM3 (ref 0–0.4)
FLU A ANTIGEN: NEGATIVE
GFR, ESTIMATED: 58 ML/MIN/1.73M2
GLUCOSE URINE: NEGATIVE MG/DL
GLUCOSE, WHOLE BLOOD: 110 MG/DL (ref 70–108)
HCT VFR BLD CALC: 44 % (ref 37–47)
HEMOGLOBIN: 15.5 GM/DL (ref 12–16)
IMMATURE GRANS (ABS): 0.01 THOU/MM3 (ref 0–0.07)
IMMATURE GRANULOCYTES: 0.4 %
INFLUENZA B AG, EIA: NEGATIVE
KETONES, URINE: NEGATIVE
LEUKOCYTE ESTERASE, URINE: ABNORMAL
LYMPHOCYTES # BLD: 33.9 %
LYMPHOCYTES ABSOLUTE: 0.8 THOU/MM3 (ref 1–4.8)
MCH RBC QN AUTO: 31.3 PG (ref 27–31)
MCHC RBC AUTO-ENTMCNC: 35.2 GM/DL (ref 33–37)
MCV RBC AUTO: 89 FL (ref 81–99)
MONOCYTES # BLD: 11.7 %
MONOCYTES ABSOLUTE: 0.3 THOU/MM3 (ref 0.4–1.3)
NITRITE, URINE: NEGATIVE
NUCLEATED RED BLOOD CELLS: 0 /100 WBC
PDW BLD-RTO: 13.4 % (ref 11.5–14.5)
PH, URINE: 6 (ref 5–9)
PLATELET # BLD: 132 THOU/MM3 (ref 130–400)
PMV BLD AUTO: 11 FL (ref 7.4–10.4)
POTASSIUM, WHOLE BLOOD: 3.5 MEQ/L (ref 3.5–4.9)
PROTEIN, URINE: NEGATIVE MG/DL
RBC # BLD: 4.95 MILL/MM3 (ref 4.2–5.4)
SARS-COV-2, NAA: DETECTED
SEG NEUTROPHILS: 53.2 %
SEGMENTED NEUTROPHILS ABSOLUTE COUNT: 1.3 THOU/MM3 (ref 1.8–7.7)
SODIUM, WHOLE BLOOD: 137 MEQ/L (ref 138–146)
SPECIFIC GRAVITY, URINE: 1.02 (ref 1–1.03)
UROBILINOGEN, URINE: 0.2 EU/DL (ref 0.2–1)
WBC # BLD: 2.5 THOU/MM3 (ref 4.8–10.8)

## 2021-12-21 PROCEDURE — 93005 ELECTROCARDIOGRAM TRACING: CPT | Performed by: NURSE PRACTITIONER

## 2021-12-21 PROCEDURE — 87804 INFLUENZA ASSAY W/OPTIC: CPT

## 2021-12-21 PROCEDURE — 85025 COMPLETE CBC W/AUTO DIFF WBC: CPT

## 2021-12-21 PROCEDURE — 99214 OFFICE O/P EST MOD 30 MIN: CPT

## 2021-12-21 PROCEDURE — 99202 OFFICE O/P NEW SF 15 MIN: CPT | Performed by: NURSE PRACTITIONER

## 2021-12-21 PROCEDURE — 84520 ASSAY OF UREA NITROGEN: CPT

## 2021-12-21 PROCEDURE — 87635 SARS-COV-2 COVID-19 AMP PRB: CPT

## 2021-12-21 PROCEDURE — 82947 ASSAY GLUCOSE BLOOD QUANT: CPT

## 2021-12-21 PROCEDURE — 36415 COLL VENOUS BLD VENIPUNCTURE: CPT

## 2021-12-21 PROCEDURE — 99213 OFFICE O/P EST LOW 20 MIN: CPT

## 2021-12-21 PROCEDURE — 80051 ELECTROLYTE PANEL: CPT

## 2021-12-21 PROCEDURE — 71046 X-RAY EXAM CHEST 2 VIEWS: CPT

## 2021-12-21 PROCEDURE — 81003 URINALYSIS AUTO W/O SCOPE: CPT

## 2021-12-21 PROCEDURE — 82565 ASSAY OF CREATININE: CPT

## 2021-12-21 RX ORDER — DEXAMETHASONE 6 MG/1
6 TABLET ORAL 2 TIMES DAILY WITH MEALS
Qty: 20 TABLET | Refills: 0 | Status: SHIPPED | OUTPATIENT
Start: 2021-12-21 | End: 2021-12-31

## 2021-12-21 ASSESSMENT — ENCOUNTER SYMPTOMS
CHOKING: 0
ANAL BLEEDING: 0
NAUSEA: 0
CONSTIPATION: 0
HEMATOCHEZIA: 0
TROUBLE SWALLOWING: 0
VISUAL CHANGE: 0
FACIAL SWELLING: 0
DIARRHEA: 0
APNEA: 0
RECTAL PAIN: 0
COLOR CHANGE: 0
SHORTNESS OF BREATH: 1
BLOOD IN STOOL: 0
VOICE CHANGE: 0
SINUS PRESSURE: 0
SINUS PAIN: 0
ABDOMINAL DISTENTION: 0
RHINORRHEA: 0
COUGH: 1
STRIDOR: 0
CHEST TIGHTNESS: 0
SORE THROAT: 0
VOMITING: 0
WHEEZING: 0
ABDOMINAL PAIN: 0

## 2021-12-21 NOTE — ED TRIAGE NOTES
Pt to [facility] ambulatory with fatigue and SOB. This started on Sunday. Pt has no chest pain. Peripheral pulses present x 2 in wrists. Brisk capillary refill noted in fingers. Left lung is diminished.

## 2021-12-21 NOTE — ED PROVIDER NOTES
[place]  [facility]gent Care Encounter      CHIEF COMPLAINT       Chief Complaint   Patient presents with    Fatigue       Nurses Notes reviewed and I agree except as noted in the HPI. HISTORY OFPRESENT ILLNESS   Naga De La Torre is a 66 y.o. The history is provided by the patient. No  was used. Fatigue  Severity:  Severe  Onset quality:  Gradual  Duration:  1 week  Timing:  Constant  Progression:  Unchanged  Chronicity:  New  Context: decreased sleep and stress    Context: not alcohol use, not allergies, not change in medication, not dehydration, not drug use, not increased activity, not pinched nerve, not recent infection and not urinary tract infection    Context comment:  Distant nesujey by Amber Hicks  suddenly 20  Relieved by:  Nothing  Worsened by:  Nothing  Ineffective treatments:  None tried  Associated symptoms: anorexia, cough and shortness of breath    Associated symptoms: no abdominal pain, no aphasia, no arthralgias, no ataxia, no chest pain, no diarrhea, no difficulty walking, no dizziness, no drooling, no dysphagia, no dysuria, no numbness in extremities, no falls, no fever, no foul-smelling urine, no frequency, no headaches, no hematochezia, no lethargy, no loss of consciousness, no melena, no myalgias, no nausea, no near-syncope, no seizures, no sensory-motor deficit, no stroke symptoms, no syncope, no urgency, no vision change and no vomiting    Risk factors: diabetes    Risk factors: no anemia, no congestive heart failure, no coronary artery disease, no excessive menstruation, no family hx of stroke, no heart disease, no neurologic disease, no new medications and no recent stressors    Risk factors comment:  Occassional episode of watery diarrhea       REVIEW OF SYSTEMS     Review of Systems   Constitutional: Positive for activity change, appetite change and fatigue. Negative for chills, diaphoresis and fever.    HENT: Negative for congestion, dental problem, drooling, ear discharge, ear pain, facial swelling, hearing loss, mouth sores, nosebleeds, postnasal drip, rhinorrhea, sinus pressure, sinus pain, sneezing, sore throat, tinnitus, trouble swallowing and voice change. Respiratory: Positive for cough and shortness of breath. Negative for apnea, choking, chest tightness, wheezing and stridor. Cardiovascular: Negative for chest pain, palpitations, leg swelling, syncope and near-syncope. Gastrointestinal: Positive for anorexia. Negative for abdominal distention, abdominal pain, anal bleeding, blood in stool, constipation, diarrhea, dysphagia, hematochezia, melena, nausea, rectal pain and vomiting. Genitourinary: Negative for difficulty urinating, dyspareunia, dysuria, enuresis, flank pain, frequency, genital sores, hematuria and urgency. Musculoskeletal: Negative for arthralgias, falls and myalgias. Skin: Negative for color change, pallor, rash and wound. Neurological: Negative for dizziness, seizures, loss of consciousness and headaches. PAST MEDICAL HISTORY         Diagnosis Date    Anxiety     Arthritis     GERD (gastroesophageal reflux disease)     History of kidney stones     Hyperlipidemia     Hypertension     Kidney stone 2011    Osteoarthritis     Sleep apnea        SURGICAL HISTORY     Patient  has a past surgical history that includes Colonoscopy (1-2012); Dilation and curettage of uterus (1996); Lithotripsy (3-9-11); Foot surgery (Left, 2011); Cystoscopy (2011); Throat surgery (1-2014); and Cardiac catheterization.     CURRENT MEDICATIONS       Discharge Medication List as of 12/21/2021  6:26 PM      CONTINUE these medications which have NOT CHANGED    Details   atorvastatin (LIPITOR) 40 MG tablet Take 40 mg by mouth dailyHistorical Med      NONFORMULARY Take 1 capsule by mouth [facility]      Multiple Vitamins-Minerals (MULTIVITAMIN ADULT) CHEW Take 2 each by mouth dailyHistorical Med      aspirin 81 MG EC tablet Take 81 mg by mouth dailyHistorical Med      hydrochlorothiazide (HYDRODIURIL) 25 MG tablet Take 25 mg by mouth dailyHistorical Med      metoprolol tartrate (LOPRESSOR) 50 MG tablet Take 50 mg by mouth 2 times dailyHistorical Med      alendronate (FOSAMAX) 35 MG tablet Take 35 mg by mouth every 7 daysHistorical Med      RAMIPRIL PO Take 10 mg by mouth daily Historical Med      acetaminophen (TYLENOL) 325 MG tablet Take 2 tablets by mouth every 4 hours as needed for Pain or FeverOTC             ALLERGIES     Patient is is allergic to dilaudid [hydromorphone hcl], toradol [ketorolac tromethamine], and tramadol. FAMILY HISTORY     Patient's family history includes Arthritis in her mother; Cancer in her mother; Depression in her brother, mother, and sister; Heart Attack in her mother; Heart Disease in her father; High Blood Pressure in her father; High Cholesterol in her father; Obesity in her brother, father, mother, and sister. SOCIAL HISTORY     Patient  reports that she has never smoked. She has never used smokeless tobacco. She reports that she does not drink alcohol and does not use drugs. PHYSICAL EXAM     ED TRIAGE VITALS  BP: 122/72, Temp: 98.6 °F (37 °C), Pulse: 97, Resp: 20, SpO2: 93 %  Physical Exam  Vitals and nursing note reviewed. Constitutional:       General: She is awake. She is not in acute distress. Appearance: Normal appearance. She is well-developed and well-groomed. She is obese. She is not ill-appearing, toxic-appearing or diaphoretic. Interventions: She is not intubated. HENT:      Head: Normocephalic and atraumatic. Right Ear: Tympanic membrane, ear canal and external ear normal. There is no impacted cerumen. Left Ear: Tympanic membrane, ear canal and external ear normal. There is no impacted cerumen. Nose: Nose normal. No congestion or rhinorrhea. Mouth/Throat:      Mouth: Mucous membranes are moist.      Pharynx: Oropharynx is clear.  No oropharyngeal exudate or posterior oropharyngeal erythema. Eyes:      General:         Right eye: No discharge. Left eye: No discharge. Extraocular Movements: Extraocular movements intact. Conjunctiva/sclera: Conjunctivae normal.   Neck:      Vascular: No carotid bruit. Cardiovascular:      Rate and Rhythm: Regular rhythm. Tachycardia present. Pulses: Normal pulses. Radial pulses are 2+ on the right side and 2+ on the left side. Heart sounds: Normal heart sounds, S1 normal and S2 normal. Heart sounds not distant. No murmur heard. No systolic murmur is present. No diastolic murmur is present. No friction rub. No gallop. No S3 or S4 sounds. Comments: EKG NSR  Pulmonary:      Effort: Pulmonary effort is normal. No tachypnea, bradypnea, accessory muscle usage, prolonged expiration, respiratory distress or retractions. She is not intubated. Breath sounds: Normal breath sounds. No stridor, decreased air movement or transmitted upper airway sounds. No decreased breath sounds, wheezing, rhonchi or rales. Abdominal:      Tenderness: There is no right CVA tenderness or left CVA tenderness. Musculoskeletal:         General: Normal range of motion. Cervical back: Normal range of motion and neck supple. No rigidity or tenderness. Lymphadenopathy:      Cervical: No cervical adenopathy. Skin:     General: Skin is warm. Capillary Refill: Capillary refill takes less than 2 seconds. Comments: Skin turgor within 2 seconds   Neurological:      General: No focal deficit present. Mental Status: She is alert and oriented to person, place, and time. Psychiatric:         Mood and Affect: Mood normal.         Behavior: Behavior normal. Behavior is cooperative. Thought Content:  Thought content normal.         Judgment: Judgment normal.         DIAGNOSTIC RESULTS   Labs:  Results for orders placed or performed during the hospital encounter of 12/21/21   COVID-19, Rapid   Result Value Ref Range    SARS-CoV-2, ANDREA DETECTED (AA) NOT DETECTED   Rapid influenza A/B antigens   Result Value Ref Range    Flu A Antigen Negative NEGATIVE    Influenza B Ag, EIA Negative NEGATIVE   POC Basic Metabol Panel without Calcium   Result Value Ref Range    Sodium, Whole Blood 137 (L) 138 - 146 meq/l    Potassium, Whole Blood 3.5 3.5 - 4.9 meq/l    Chloride, Whole Blood 99 98 - 109 meq/l    CO2, WHOLE BLOOD 27 23 - 33 meq/l    Glucose, Whole Blood 110 (H) 70 - 108 mg/dl    BUN WHOLE BLOOD, UC 21 8 - 26 mg/dl    CREATININE WHOLE BLOOD, UC 1.0 0.5 - 1.2 mg/dl   Urinalysis   Result Value Ref Range    Glucose, Ur Negative NEGATIVE mg/dl    Bilirubin Urine Negative NEGATIVE    Ketones, Urine Negative NEGATIVE    Specific Gravity, Urine 1.025 1.002 - 1.030    Blood, Urine Negative NEGATIVE    pH, Urine 6.00 5.0 - 9.0    Protein, Urine Negative NEGATIVE mg/dl    Urobilinogen, Urine 0.20 0.2 - 1.0 eu/dl    Nitrite, Urine Negative NEGATIVE    Leukocyte Esterase, Urine Small (A) NEGATIVE    Color, UA Yellow STRAW-YELLOW    Character, Urine Clear CLEAR-SL CLOUD   CBC auto differential   Result Value Ref Range    WBC 2.5 (L) 4.8 - 10.8 thou/mm3    RBC 4.95 4.20 - 5.40 mill/mm3    Hemoglobin 15.5 12.0 - 16.0 gm/dl    Hematocrit 44.0 37.0 - 47.0 %    MCV 89 81 - 99 fL    MCH 31.3 (H) 27.0 - 31.0 pg    MCHC 35.2 33.0 - 37.0 gm/dl    RDW 13.4 11.5 - 14.5 %    Platelets 718 511 - 447 thou/mm3    MPV 11.0 (H) 7.4 - 10.4 fL   Glomerular Filtration Rate, Estimated   Result Value Ref Range    GFR, Estimated 58 ml/min/1.73m2   EKG 12 Lead   Result Value Ref Range    Ventricular Rate 94 BPM    Atrial Rate 94 BPM    P-R Interval 152 ms    QRS Duration 74 ms    Q-T Interval 346 ms    QTc Calculation (Bazett) 432 ms    P Axis 86 degrees    R Axis 25 degrees    T Axis 51 degrees       IMAGING:  XR CHEST (2 VW)   Final Result   Stable radiographic appearance of the chest. No evidence of an acute Brook Pritchett, MARIA INES - STANFORD Pritchett, APRN - CNP  12/21/21 [address] [place] [address], APRN - CNP  12/21/21 1926

## 2021-12-22 ENCOUNTER — CARE COORDINATION (OUTPATIENT)
Dept: CARE COORDINATION | Age: 71
End: 2021-12-22

## 2021-12-22 ENCOUNTER — HOSPITAL ENCOUNTER (OUTPATIENT)
Dept: NURSING | Age: 71
Discharge: HOME | End: 2021-12-22
Payer: MEDICARE

## 2021-12-22 VITALS
DIASTOLIC BLOOD PRESSURE: 72 MMHG | HEART RATE: 77 BPM | TEMPERATURE: 96.8 F | RESPIRATION RATE: 22 BRPM | SYSTOLIC BLOOD PRESSURE: 136 MMHG | OXYGEN SATURATION: 92 %

## 2021-12-22 DIAGNOSIS — U07.1 COVID-19: ICD-10-CM

## 2021-12-22 DIAGNOSIS — U07.1 COVID-19: Primary | ICD-10-CM

## 2021-12-22 PROCEDURE — M0245 HC IV INFUSION BAMLANIVIMAB & ETESEVIMAB W/MONITORING: HCPCS

## 2021-12-22 PROCEDURE — 6360000002 HC RX W HCPCS: Performed by: INTERNAL MEDICINE

## 2021-12-22 PROCEDURE — 2500000003 HC RX 250 WO HCPCS: Performed by: INTERNAL MEDICINE

## 2021-12-22 PROCEDURE — 2580000003 HC RX 258: Performed by: INTERNAL MEDICINE

## 2021-12-22 RX ORDER — SODIUM CHLORIDE 9 MG/ML
25 INJECTION, SOLUTION INTRAVENOUS PRN
Status: CANCELLED | OUTPATIENT
Start: 2021-12-22

## 2021-12-22 RX ORDER — ACETAMINOPHEN 325 MG/1
650 TABLET ORAL
Status: ACTIVE | OUTPATIENT
Start: 2021-12-22 | End: 2021-12-22

## 2021-12-22 RX ORDER — HEPARIN SODIUM (PORCINE) LOCK FLUSH IV SOLN 100 UNIT/ML 100 UNIT/ML
500 SOLUTION INTRAVENOUS PRN
Status: CANCELLED | OUTPATIENT
Start: 2021-12-22

## 2021-12-22 RX ORDER — EPINEPHRINE 1 MG/ML
0.3 INJECTION, SOLUTION, CONCENTRATE INTRAVENOUS PRN
Status: CANCELLED | OUTPATIENT
Start: 2021-12-22

## 2021-12-22 RX ORDER — SODIUM CHLORIDE 9 MG/ML
INJECTION, SOLUTION INTRAVENOUS CONTINUOUS
Status: CANCELLED | OUTPATIENT
Start: 2021-12-22

## 2021-12-22 RX ORDER — DIPHENHYDRAMINE HYDROCHLORIDE 50 MG/ML
50 INJECTION INTRAMUSCULAR; INTRAVENOUS
Status: CANCELLED | OUTPATIENT
Start: 2021-12-22

## 2021-12-22 RX ORDER — SODIUM CHLORIDE 0.9 % (FLUSH) 0.9 %
5-40 SYRINGE (ML) INJECTION PRN
Status: DISCONTINUED | OUTPATIENT
Start: 2021-12-22 | End: 2021-12-23 | Stop reason: HOSPADM

## 2021-12-22 RX ORDER — DIPHENHYDRAMINE HYDROCHLORIDE 50 MG/ML
50 INJECTION INTRAMUSCULAR; INTRAVENOUS
Status: ACTIVE | OUTPATIENT
Start: 2021-12-22 | End: 2021-12-22

## 2021-12-22 RX ORDER — ACETAMINOPHEN 325 MG/1
650 TABLET ORAL
Status: CANCELLED | OUTPATIENT
Start: 2021-12-22

## 2021-12-22 RX ORDER — ONDANSETRON 2 MG/ML
8 INJECTION INTRAMUSCULAR; INTRAVENOUS
Status: ACTIVE | OUTPATIENT
Start: 2021-12-22 | End: 2021-12-22

## 2021-12-22 RX ORDER — ONDANSETRON 2 MG/ML
8 INJECTION INTRAMUSCULAR; INTRAVENOUS
Status: CANCELLED | OUTPATIENT
Start: 2021-12-22

## 2021-12-22 RX ORDER — ALBUTEROL SULFATE 90 UG/1
4 AEROSOL, METERED RESPIRATORY (INHALATION) PRN
Status: DISCONTINUED | OUTPATIENT
Start: 2021-12-22 | End: 2021-12-24 | Stop reason: HOSPADM

## 2021-12-22 RX ORDER — SODIUM CHLORIDE 0.9 % (FLUSH) 0.9 %
5-40 SYRINGE (ML) INJECTION PRN
Status: CANCELLED | OUTPATIENT
Start: 2021-12-22

## 2021-12-22 RX ORDER — ALBUTEROL SULFATE 90 UG/1
4 AEROSOL, METERED RESPIRATORY (INHALATION) PRN
Status: CANCELLED | OUTPATIENT
Start: 2021-12-22

## 2021-12-22 RX ORDER — SODIUM CHLORIDE 9 MG/ML
INJECTION, SOLUTION INTRAVENOUS CONTINUOUS
Status: DISCONTINUED | OUTPATIENT
Start: 2021-12-22 | End: 2021-12-23 | Stop reason: HOSPADM

## 2021-12-22 RX ORDER — HEPARIN SODIUM (PORCINE) LOCK FLUSH IV SOLN 100 UNIT/ML 100 UNIT/ML
500 SOLUTION INTRAVENOUS PRN
Status: DISCONTINUED | OUTPATIENT
Start: 2021-12-22 | End: 2021-12-23 | Stop reason: HOSPADM

## 2021-12-22 RX ORDER — SODIUM CHLORIDE 9 MG/ML
25 INJECTION, SOLUTION INTRAVENOUS PRN
Status: DISCONTINUED | OUTPATIENT
Start: 2021-12-22 | End: 2021-12-23 | Stop reason: HOSPADM

## 2021-12-22 RX ORDER — SODIUM CHLORIDE 9 MG/ML
INJECTION, SOLUTION INTRAVENOUS CONTINUOUS
Status: DISCONTINUED | OUTPATIENT
Start: 2021-12-22 | End: 2021-12-24 | Stop reason: HOSPADM

## 2021-12-22 RX ADMIN — SODIUM CHLORIDE: 9 INJECTION, SOLUTION INTRAVENOUS at 16:17

## 2021-12-22 RX ADMIN — SODIUM CHLORIDE: 9 INJECTION, SOLUTION INTRAVENOUS at 16:15

## 2021-12-22 ASSESSMENT — PAIN SCALES - GENERAL
PAINLEVEL_OUTOF10: 0
PAINLEVEL_OUTOF10: 0

## 2021-12-22 ASSESSMENT — PAIN - FUNCTIONAL ASSESSMENT: PAIN_FUNCTIONAL_ASSESSMENT: 0-10

## 2021-12-22 NOTE — ACP (ADVANCE CARE PLANNING)
Advance Care Planning   Healthcare Decision Maker:      Click here to complete Healthcare Decision Makers including selection of the Healthcare Decision Maker Relationship (ie \"Primary\"). Today we discussed healthcare decision maker.  primary contact is next of kin

## 2021-12-22 NOTE — PROGRESS NOTES
1605    pt admitted to \[facility]\"" per ambulation for an monoclonal antibody infusion. Emergency use authorization form given to patient for bamlanivimib and etesevimab . Verbalize understanding. Ok to proceed. 1617 infusion in progress  1622 denies complaints  1630 feels the same, denies complaints  1647 infusion completed. 1700 O2 sats dipping into 89-88, encouraged deep breaths and coughing- immediate increase to 95-96. Observing. Decreased rate of . 9NS. denies complaints, denies any increase in SOB. [id number] denies SOB, sats staying 93-95%   1730 Looking at phone , VSS Sat 92%, shows no signs of distress, denies complaints    1740 patient wanting to go home. discharge instructions reviewed with patient. Verbalize understanding of home going instructions   1745 ambulated to bathroom with steady gait, voided without difficulty  1750 pt discharged, daughter driving her home at hospital entrance.                _m___ Safety:       (Environmental)   [place] to environment   Ensure ID band is correct and in place/ allergy band as needed   Assess for fall risk   Initiate fall precautions as applicable (fall band, side rails, etc.)   Call light within reach   Bed in low position/ wheels locked    ___m_ Pain:        Assess pain level and characteristics   Administer analgesics as ordered   Assess effectiveness of pain management and report to MD as needed    __m__ Knowledge Deficit:   Assess baseline knowledge   Provide teaching at level of understanding   Provide teaching via preferred learning method   Evaluate teaching effectiveness    ___m_ Hemodynamic/Respiratory Status:       (Pre and Post Procedure Monitoring)   Assess/Monitor vital signs and LOC   Assess Baseline SpO2 prior to any sedation   Obtain weight/height   Assess vital signs/ LOC until patient meets discharge criteria   Monitor procedure site and notify MD of any issues

## 2021-12-22 NOTE — CARE COORDINATION
Patient contacted regarding COVID-19 diagnosis. Discussed COVID-19 related testing which was available at this time. Test results were positive. Patient informed of results, if available? Yes. Ambulatory Care Manager contacted the patient by telephone to perform post discharge assessment. Call within 2 business days of discharge: Yes. Verified name and  with patient as identifiers. Provided introduction to self, and explanation of the CTN/ACM role, and reason for call due to risk factors for infection and/or exposure to COVID-19. Symptoms reviewed with patient who verbalized the following symptoms: fatigue, sweating, diarrhea, no new symptoms and no worsening symptoms. Due to no new or worsening symptoms encounter was not routed to provider for escalation. Discussed follow-up appointments. If no appointment was previously scheduled, appointment scheduling offered: No.  [facility] follow up appointment(s):   Future Appointments   Date Time Provider Inga Herring   2022  1:00 PM Latanya Montgomery RD, LD [facility] [address] [place] [address]   2022  1:00 PM Martin Lea RN [address] [address] [place]  follow up appointment(s): advised pt to f/u with Alvin Ramsay. Pt reports that they have been in contact with her re: getting antibody infusion ordered. Non-face-to-face services provided:  Obtained and reviewed discharge summary and/or continuity of care documents     Advance Care Planning:   Does patient have an Advance Directive:  not on file. Educated patient about risk for severe COVID-19 due to risk factors according to CDC guidelines. ACM reviewed discharge instructions, medical action plan and red flag symptoms with the patient who verbalized understanding. Discussed COVID vaccination status: No. Education provided on COVID-19 vaccination as appropriate. Discussed exposure protocols and quarantine with CDC Guidelines.  Patient was given an opportunity to verbalize any questions and concerns and agrees to contact ACM or health care provider for questions related to their healthcare. Reviewed and educated patient on any new and changed medications related to discharge diagnosis     Was patient discharged with a pulse oximeter? No pt has home pulse ox. 91% RA. Pt has CPAP that she has been wearing t/o the day if needed. Discussed and confirmed pulse oximeter discharge instructions and when to notify provider or seek emergency care. ACM provided contact information. Plan for follow-up call in 3-5 days based on severity of symptoms and risk factors.   Reviewed COVID zones

## 2021-12-27 ENCOUNTER — CARE COORDINATION (OUTPATIENT)
Dept: CARE COORDINATION | Age: 71
End: 2021-12-27

## 2021-12-27 NOTE — CARE COORDINATION
You Patient resolved from the Care Transitions episode on 12-27-21  Discussed COVID-19 related testing which was available at this time. Test results were positive. Patient informed of results, if available? Yes    Patient/family has been provided the following resources and education related to COVID-19:                         Signs, symptoms and red flags related to COVID-19            CDC exposure and quarantine guidelines            [facility] exposure contact - [phone]            Contact for their local Department of Health                 Patient currently reports that the following symptoms have improved:  all sx's resolved     No further outreach scheduled with this CTN/ACM. Episode of Care resolved. Patient has this CTN/ACM contact information if future needs arise.

## 2022-01-25 ENCOUNTER — OFFICE VISIT (OUTPATIENT)
Dept: INTERNAL MEDICINE CLINIC | Age: 72
End: 2022-01-25
Payer: MEDICARE

## 2022-01-25 VITALS — BODY MASS INDEX: 46.25 KG/M2 | TEMPERATURE: 97.6 F | WEIGHT: 277.6 LBS | HEIGHT: 65 IN

## 2022-01-25 DIAGNOSIS — E11.9 TYPE 2 DIABETES MELLITUS WITHOUT COMPLICATION, WITHOUT LONG-TERM CURRENT USE OF INSULIN (HCC): ICD-10-CM

## 2022-01-25 PROCEDURE — 97803 MED NUTRITION INDIV SUBSEQ: CPT | Performed by: DIETITIAN, REGISTERED

## 2022-01-25 RX ORDER — CHOLECALCIFEROL (VITAMIN D3) 125 MCG
CAPSULE ORAL DAILY
COMMUNITY

## 2022-01-25 NOTE — PATIENT INSTRUCTIONS
Schedule in your work-out - get on your gazelle 10 minutes about 1 hour after eating a meal. And work up to a goal of 150 minutes of moderate activity each week. - Don't forget to try You tube exercises  - Try the DVD exercises too. Good job keeping vegetables in your meal and snack planning - have a veggie tray to nibble off of each week. Good to add 2-3 dairy servings/day  - 2/3 cup yogurt   - 1 cup 1% milk  - 1/2 cup lowfat cottage cheese    Fresh fruit is a good choice when wanting something sweet to eat   - 1 small apple   - 1 cup berries  - 15 grapes    Get in whole grains/starchy veggies each day - 4-6 servings/day. - 1 slice whole wheat bread (3 gms fiber/slice)  - 1/3 cup brown rice  - 1 small baked potato  - 1/2 cup cooked potatoes     Add lean protein with lunch and supper meals.     Keep logging your food intake on myfitnesspal.

## 2022-01-25 NOTE — PROGRESS NOTES
[address] [place] [address]. [address] [place] [address] Marco., [facility], [address]  [phone] (phone)  [phone] (fax)    Patient Name: Sav Osuna. Date of Birth: [birth year]. MRN: [id number]      Assessment: Patient is a 66 y.o. 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     -Nutritionally relevant labs:   Lab Results   Component Value Date/Time    LABA1C 5.8 03/22/2021 12:00 AM    LABA1C 5.9 (H) 01/25/2021 01:40 PM    LABA1C 5.7 09/10/2020 12:00 AM    LABA1C 5.7 06/04/2020 12:00 AM    GLUCOSE 94 12/08/2021 12:00 PM    GLUCOSE 109 (H) 10/06/2021 09:39 AM    CHOL 174 12/08/2021 12:00 PM    HDL 49 12/08/2021 12:00 PM    LDLCALC 96 12/08/2021 12:00 PM    TRIG 145 12/08/2021 12:00 PM     Metformin 500 mg BID  Stopped taking trulicity - d/t side effects making her tired and affecting her kidneys - per urgent care visit addressing her tiredness. Pt went to urgent care on matthe[address]. (Visit in EHR)    Pt feels had AIC done at her doctor's office.  -Blood sugar trends: written BS documentation provided. FBS:  85 - 131  Pp BS's 148 - 202  Other 110, 117    Has been following new weight loss foods and shakes x 1 week. -Food recall: does not like to do written food logging, although she is doing [employer] but unable to get into her account to print her food logging  Her daugher and granddaughter are encouring her to follow Keto supplement shakes make from a powder called \"GC Control\"  Breakfast: skips gets up late 10 - 11 am.   Lunch: vegetable soup (homemade and added rotissirie with this). Dinner:  Healthy choice or [employer] - grilled chicken and broccoli sangeeta, green grapes OR chicken fettucini. One night out to eat with her grandson - sweet and sour chicken & edamame - pt states only ate 1/2   Not eating out anymore - only one time to [address] since [place] so not getting their pastries or high sugared flavored coffees.   No eating brkf bagel sandwiches anymore. Snacks: BB muffin, cheez its or 16 flatbread crips or atkins bar    -Main Beverages: water and zero gataorade - 2 per week. -Impression of Dietary Intake: on average, 2 meals per day, 2 snacks/day, currently doing weight loss shake 1-2/day    Current Outpatient Medications on File Prior to Visit   Medication Sig Dispense Refill    Acetaminophen 325 MG CAPS Take by mouth as needed      Cholecalciferol (VITAMIN D3) 50 MCG (2000 UT) TABS Take by mouth daily      Black Elderberry (SAMBUCUS ELDERBERRY PO) Take 1,250 mg by mouth      atorvastatin (LIPITOR) 40 MG tablet Take 40 mg by mouth daily      NONFORMULARY Take 1 capsule by mouth daily [employer]      Multiple Vitamins-Minerals (MULTIVITAMIN ADULT) CHEW Take 2 each by mouth daily      aspirin 81 MG EC tablet Take 81 mg by mouth daily      hydrochlorothiazide (HYDRODIURIL) 25 MG tablet Take 25 mg by mouth daily      metoprolol tartrate (LOPRESSOR) 50 MG tablet Take 50 mg by mouth 2 times daily      alendronate (FOSAMAX) 35 MG tablet Take 35 mg by mouth every 7 days      RAMIPRIL PO Take 10 mg by mouth daily        No current facility-administered medications on file prior to visit. Vitals from current and previous visits:  Temp 97.6 °F (36.4 °C)   Ht 5' 5\" (1.651 m)   Wt 277 lb 9.6 oz (125.9 kg)   BMI 46.20 kg/m²     -Body mass index is 46.2 kg/m². Greater than 40 - Morbid Obesity / Extreme Obesity / Grade III. - Patient lost and 2 pounds over the last 5 mo. .  -Weight goal: lose weight. Nutrition Diagnosis:   Obesity related to uninterested in learning/practicing nutrition recommendations as evidenced by patient wanting to follow the advice of family for weight loss. Intervention:  -Instructed the patient on: Meal Planning for Regular, Balanced Meals & Snacks and The Importance of Regular Physical Activity  - Handouts:  Go For LIfe DVD - ([facility] exercise DVD).     Patient Instructions   Schedule in your work-out - get on your gazelle 10 minutes about 1 hour after eating a meal. And work up to a goal of 150 minutes of moderate activity each week. - Don't forget to try You tube exercises  - Try the DVD exercises too. Good job keeping vegetables in your meal and snack planning - have a veggie tray to nibble off of each week. Good to add 2-3 dairy servings/day  - 2/3 cup yogurt   - 1 cup 1% milk  - 1/2 cup lowfat cottage cheese    Fresh fruit is a good choice when wanting something sweet to eat   - 1 small apple   - 1 cup berries  - 15 grapes    Get in whole grains/starchy veggies each day - 4-6 servings/day. - 1 slice whole wheat bread (3 gms fiber/slice)  - 1/3 cup brown rice  - 1 small baked potato  - 1/2 cup cooked potatoes     Add lean protein with lunch and supper meals. Keep logging your food intake on [employer].    -Nutrition prescription: 1400 - 1500 calories/day, 150 g carbs/day. Comprehension verified using teachback method. Monitoring/Evaluation:   -Followup visit: 8 weeks with dietitian.   -Receptiveness to education/goals: Agreeable.  -Evaluation of education: Indicates understanding.  -Readiness to change: precontemplative- balanced meal planning, exercising as able either you tube exercises or DVD exercises. -Expected compliance: fair. Thank you for your referral of this patient. Total time involved in direct patient education: 30 minutes for follow-up MNT visit.

## 2022-02-07 ENCOUNTER — HOSPITAL ENCOUNTER (OUTPATIENT)
Dept: WOMENS IMAGING | Age: 72
Discharge: HOME | End: 2022-02-07
Payer: MEDICARE

## 2022-02-07 DIAGNOSIS — Z12.31 VISIT FOR SCREENING MAMMOGRAM: ICD-10-CM

## 2022-02-07 PROCEDURE — 77063 BREAST TOMOSYNTHESIS BI: CPT

## 2022-03-23 LAB
AVERAGE GLUCOSE: NORMAL
HBA1C MFR BLD: 5.6 %

## 2022-05-03 ENCOUNTER — OFFICE VISIT (OUTPATIENT)
Dept: INTERNAL MEDICINE CLINIC | Age: 72
End: 2022-05-03
Payer: MEDICARE

## 2022-05-03 VITALS — HEIGHT: 65 IN | TEMPERATURE: 97.7 F | WEIGHT: 282.6 LBS | BODY MASS INDEX: 47.09 KG/M2

## 2022-05-03 DIAGNOSIS — E11.9 TYPE 2 DIABETES MELLITUS WITHOUT COMPLICATION, WITHOUT LONG-TERM CURRENT USE OF INSULIN (HCC): ICD-10-CM

## 2022-05-03 PROCEDURE — 97803 MED NUTRITION INDIV SUBSEQ: CPT | Performed by: DIETITIAN, REGISTERED

## 2022-05-03 NOTE — PATIENT INSTRUCTIONS
When you have a wrap or sandwich - add fresh fruit serving and veggies to this when home. Choose sugar free flavored coffees instead of the high sugar ones. Keep physical activity going to help with your weight loss efforts.  - Write it on your calendar on when you will do the Silvestre and the DVD exercise (highlight with different color markers)  - get up from sitting often. Keep making healthier low fat menu options when eating out and bringing home to eat  - add fresh fruit and veggies. Good job drinking water. For healthy kidneys - along with drinking your water also choose less processed foods and choose foods lower in sodium. Transfer a 1 week food log from your food logging onto paper and bring to next dietitian appt.

## 2022-05-03 NOTE — PROGRESS NOTES
[address] [place] [address]. [address] [place] [address] Marco., [facility], [address]  [phone] (phone)  [phone] (fax)    Patient Name: Pati Ibarra. Date of Birth: [birth year]. MRN: [id number]      Assessment: Patient is a 66 y.o. female seen for follow-up MNT visit for Diabetes. -Nutritionally relevant labs:   Lab Results   Component Value Date/Time    LABA1C 5.9 09/21/2021 12:00 AM    LABA1C 5.8 03/22/2021 12:00 AM    LABA1C 5.9 (H) 01/25/2021 01:40 PM    LABA1C 5.7 09/10/2020 12:00 AM    GLUCOSE 131 (H) 03/24/2022 10:51 AM    GLUCOSE 94 12/08/2021 12:00 PM    CHOL 186 03/24/2022 10:51 AM    HDL 48 03/24/2022 10:51 AM    LDLCALC 97 03/24/2022 10:51 AM    TRIG 204 (H) 03/24/2022 10:51 AM     -Blood sugar trends: Checking 1-3x/week at various times of the day.     Wakes at about 11:00-11:30am  Bedtime at around midnight; uses CPAP that keeps her up. Uses ear plugs to help drown out the sound, street noise keeps her up as well. Gets up a few times, falls asleep around 2:00-3:00 am.    -Food recall:   \" I don't get up until late morning, so I'm not eating at regular times\"  Breakfast: around 11:3am- 8 oz water with pills; drinks protein drink (secure, GC glucose control) or eggs and toast.   Sometimes 2, 16 oz coffees in the morning with 1 oz bliss creamer and sugar free sweetener. Lunch: 3:00-4:00 pm- sometimes skips, maryanne shira's chipotle chicken wrap, caramel machiato, with kettle chips and this time had a muffin.   -Or has a deli ham and egg sandwich on white english muffin with provolone and mustard. Dinner: 7:00 pm- 3 small English bread slices, with butter and a banana. Grilled white fish from [employer].  -Sometimes has a protein shake around 8-9:30pm Harshal Del)  Snacks: Not much of a snack person.     -Main Beverages: around 50-60 oz water, sometimes 0 Gatorade, coffee with 1 oz bliss with sugar free sweetener throughout morning.    -Impression of Dietary Intake: in general, an \"unhealthy\" diet, high fat/ cholesterol, high salt, lacking routine intake of fruits and vegetables    Current Outpatient Medications on File Prior to Visit   Medication Sig Dispense Refill    Cholecalciferol (VITAMIN D3) 50 MCG (2000 UT) TABS Take by mouth daily      Black Elderberry (SAMBUCUS ELDERBERRY PO) Take 1,250 mg by mouth      atorvastatin (LIPITOR) 40 MG tablet Take 40 mg by mouth daily      NONFORMULARY Take 1 capsule by mouth daily [employer]      Multiple Vitamins-Minerals (MULTIVITAMIN ADULT) CHEW Take 2 each by mouth daily      aspirin 81 MG EC tablet Take 81 mg by mouth daily      hydrochlorothiazide (HYDRODIURIL) 25 MG tablet Take 25 mg by mouth daily      metoprolol tartrate (LOPRESSOR) 50 MG tablet Take 50 mg by mouth 2 times daily      alendronate (FOSAMAX) 35 MG tablet Take 35 mg by mouth every 7 days      RAMIPRIL PO Take 10 mg by mouth daily       Acetaminophen 325 MG CAPS Take by mouth as needed (Patient not taking: Reported on 5/3/2022)       No current facility-administered medications on file prior to visit. Vitals from current and previous visits:  Temp 97.7 °F (36.5 °C)   Ht 5' 5\" (1.651 m)   Wt 282 lb 9.6 oz (128.2 kg)   BMI 47.03 kg/m²     -Body mass index is 47.03 kg/m². Greater than 40 - Morbid Obesity / Extreme Obesity / Grade III. - Patient gained and 5 pounds over the last 4 months.  -Weight goal: lose weight. Nutrition Diagnosis:   Inappropriate intake of types of carbohydrates related to Food and nutrition-related knowledge deficit as evidenced by Previous goals unmet/expected outcomes not achieved. Intervention:  -Impression: Pt not appropriately scanning for her glucose readings. Choosing how high fat/carb/sugar food/beverage choices when eating out. Recommended adding fruit as a side with sandwichs at lunch, preparing more meals at home to reduce carb/high fat/sugar intake.     -Instructed the patient on: Carbohydrate Counting, Consistent Carbohydrate Intake, Food Label Reading, Healthy Choices When Dining Out, Meal Planning for Regular, Balanced Meals & Snacks and The Importance of Regular Physical Activity    Patient Instructions   When you have a wrap or sandwich - add fresh fruit serving and veggies to this when home. Choose sugar free flavored coffees instead of the high sugar ones. Keep physical activity going to help with your weight loss efforts.  - Write it on your calendar on when you will do the Silvestre and the DVD exercise (highlight with different color markers)  - get up from sitting often. Keep making healthier low fat menu options when eating out and bringing home to eat  - add fresh fruit and veggies. Good job drinking water. For healthy kidneys - along with drinking your water also choose less processed foods and choose foods lower in sodium. Transfer a 1 week food log from your food logging onto paper and bring to next dietitian appt. -Nutrition prescription:  1400 - 1500 calories/day, 150 g carbs/day. Comprehension verified using teachback method. Monitoring/Evaluation:   -Followup visit: 6 weeks with dietitian.   -Receptiveness to education/goals: Agreeable.  -Evaluation of education: Needs reinforcement.  -Readiness to change: precontemplative - choosing lower fat sandwich options when eating out, balanced meal planning with adding fresh fruit and veggies. -Expected compliance: fair. Thank you for your referral of this patient. Total time involved in direct patient education: 60 minutes for follow-up MNT visit. Note was created by Beverly Dobbins, dietetic intern.     Note reviewed by Jamarcus Ordoñez RD, LD,

## 2022-05-19 PROBLEM — G47.33 OBSTRUCTIVE SLEEP APNEA SYNDROME: Status: ACTIVE | Noted: 2022-05-19

## 2022-05-19 PROBLEM — G47.30 SLEEP APNEA: Status: ACTIVE | Noted: 2022-05-19

## 2022-05-19 PROBLEM — I10 PRIMARY HYPERTENSION: Status: ACTIVE | Noted: 2022-05-19

## 2022-05-19 RX ORDER — RAMIPRIL 1.25 MG/1
1.25 CAPSULE ORAL DAILY
COMMUNITY
End: 2022-06-16

## 2022-05-19 RX ORDER — RAMIPRIL 10 MG/1
CAPSULE ORAL
COMMUNITY
Start: 2022-04-09

## 2022-05-27 ENCOUNTER — HOSPITAL ENCOUNTER (OUTPATIENT)
Dept: GENERAL RADIOLOGY | Age: 72
Discharge: HOME | End: 2022-05-27
Payer: MEDICARE

## 2022-05-27 ENCOUNTER — HOSPITAL ENCOUNTER (OUTPATIENT)
Age: 72
Discharge: HOME | End: 2022-05-27
Payer: MEDICARE

## 2022-05-27 DIAGNOSIS — M06.9 RHEUMATOID ARTHRITIS INVOLVING BOTH HANDS, UNSPECIFIED WHETHER RHEUMATOID FACTOR PRESENT (HCC): ICD-10-CM

## 2022-05-27 PROCEDURE — 73130 X-RAY EXAM OF HAND: CPT

## 2022-06-16 ENCOUNTER — OFFICE VISIT (OUTPATIENT)
Dept: INTERNAL MEDICINE CLINIC | Age: 72
End: 2022-06-16
Payer: MEDICARE

## 2022-06-16 VITALS
HEART RATE: 66 BPM | DIASTOLIC BLOOD PRESSURE: 72 MMHG | TEMPERATURE: 97 F | WEIGHT: 284.8 LBS | SYSTOLIC BLOOD PRESSURE: 128 MMHG | BODY MASS INDEX: 47.45 KG/M2 | HEIGHT: 65 IN

## 2022-06-16 DIAGNOSIS — E11.9 TYPE 2 DIABETES MELLITUS WITHOUT COMPLICATION, WITHOUT LONG-TERM CURRENT USE OF INSULIN (HCC): Primary | ICD-10-CM

## 2022-06-16 PROCEDURE — G0108 DIAB MANAGE TRN  PER INDIV: HCPCS | Performed by: REGISTERED NURSE

## 2022-06-16 ASSESSMENT — PATIENT HEALTH QUESTIONNAIRE - PHQ9
SUM OF ALL RESPONSES TO PHQ9 QUESTIONS 1 & 2: 1
2. FEELING DOWN, DEPRESSED OR HOPELESS: 0
SUM OF ALL RESPONSES TO PHQ QUESTIONS 1-9: 1
1. LITTLE INTEREST OR PLEASURE IN DOING THINGS: 1

## 2022-06-16 NOTE — PATIENT INSTRUCTIONS
Good job with blood sugar control. We want to get your weight down.      -increase activity. At least 10  Minutes 3-4 times per day if able                  [place], walking outdoors, stretch bands, etc      -Stick to your meal plan goals                Eat out only 1 time per week                More vegetables                Keep unhealthy snacks out of the house  Get interested in something! Start sewing again. Think about volunteering. Make connections with your family--kids and siblings.   Keep checking your blood sugars every other day                 BG

## 2022-06-16 NOTE — PROGRESS NOTES
[facility]  [address] [address] Trini Fernandez, [facility], [address]  [phone] (phone)  [phone] (fax)    Patient ID: Shasha Kuo [id number]  Referring Provider: Seth Smith CNP     Patient's name and  were verified. Subjective:    She presents for Her follow-up diabetic visit. She has type 2 diabetes mellitus. Home regimen includes: diet only. She is noncompliant some of the time. Assessment:     Lab Results   Component Value Date/Time    LABA1C 5.6 2022 12:00 AM    BUN 13 2022 10:51 AM    CREATININE 0.7 2022 10:51 AM     Vitals:    22 1121 22 112   BP: (!) 144/74 128/72   Site: Right Upper Arm Left Upper Arm   Position: Sitting Sitting   Pulse: 66    Temp: 97 °F (36.1 °C)    Weight: 284 lb 12.8 oz (129.2 kg)    Height: 5' 5\" (1.651 m)      Wt Readings from Last 3 Encounters:   22 284 lb 12.8 oz (129.2 kg)   22 282 lb 9.6 oz (128.2 kg)   22 277 lb 9.6 oz (125.9 kg)     Ht Readings from Last 3 Encounters:   22 5' 5\" (1.651 m)   22 5' 5\" (1.651 m)   22 5' 5\" (1.651 m)       Diabetes Pharmacotherapy:  Diet only    Glucose Trends:   Current monitoring regimen: Fingerstick blood tests - less than 1 times daily; 3 times per week  Home blood sugar trends:    -Fasting AM:    -Before lunch: 124, 125   -Before dinner: 81, 130, 229   -Bedtime: 131-161  Any episodes of hypoglycemia? no   -Treats with n/a    Lifestyle Factors:   Previous visit with dietician: yes - 5/3/2022  Current diet: B: egg/ toast or oatmeal/ yogurt or meal shake            L: lunchmeat GC control shake                              Denise Oiler Ev soup/ salad or sand/ soup                       D: Control shake or meat/ veg                       Snacks: random - chips; hamburger/ chili; oreo cookie                       Beverages:  Current exercise:  walks on [address] or in neighborhood; most days 10 minutes.  Up to 3-4 times per day    Health Maintenance:  Eye exam current (within one year): yes Date: 12/2021- Eye lid surgery bilat. Dr. Matthew Roche. Last exam 6/2021 Dr. Ezekiel Keith. Appt 7/23/22  Any history of foot problems? no  Foot exam up to date: yes Date: 9/27/2021  Immunizations up to date:               Flu: yes   Pneumonia: yes   COVID-19: no  The 10-year ASCVD risk score (Romero Caldwell., et al., 2013) is: 27.8%    Values used to calculate the score:      Age: 66 years      Sex: Female      Is Non- : No      Diabetic: Yes      Tobacco smoker: No      Systolic Blood Pressure: 843 mmHg      Is BP treated: Yes      HDL Cholesterol: 48 mg/dL      Total Cholesterol: 186 mg/dL   Last panel - LDL:   Lab Results   Component Value Date    LDLCALC 97 03/24/2022     Taking ASA:  Yes   Taking statin: Yes - atorvastatin  Last microalbumin/creatinine ratio: No results found for: MCACR, MALBCR, MALBCRRATEXT   Taking ACE/ARB: Yes- ramipril  Depression screening completed. 6/16/2022  Score 1    Focus:   Diabetes Education. Last A1C: 5.6% 3/23/22. She is doing fairly well with exercise, despite bilat knee pain. She recently had an injection in her left knee (6/8/22). Weight is up about 5#. She is following with the dietician for meal planning; she needs to stop snacking. Her glucose levels are in good control, but GLP1 therapy could be beneficial for weight loss efforts. Curriculum Area Focus: Carbohydrate counting/meal planning, Physical activity goals, and Lifestyle & healthy coping  DSME PLAN:     Good job with blood sugar control. We want to get your weight down.      -increase activity. At least 10  Minutes 3-4 times per day if able                  [place], walking outdoors, stretch bands, etc      -Stick to your meal plan goals                Eat out only 1 time per week                More vegetables                Keep unhealthy snacks out of the house  Get interested in something! Start sewing again. Think about volunteering.               Make connections with your family--kids and siblings. Keep checking your blood sugars every other day                 BG          Goals Addressed                   This Visit's Progress     Exercise 3x per week (30 min per time)   Not on track     limit carbs to 30-50 grams carb   Not on track           Meter download, medications, PMH and nursing assessment reviewed. Malinda San states She is willing to participate in this plan of care and verbalized understanding of all instructions provided. Teach back used to verify comprehension. Follow-up: 5 months    Total time involved in direct patient education: 60 minutes.

## 2022-10-19 ENCOUNTER — HOSPITAL ENCOUNTER (OUTPATIENT)
Dept: WOMENS IMAGING | Age: 72
Discharge: HOME | End: 2022-10-19
Payer: MEDICARE

## 2022-10-19 DIAGNOSIS — Z78.0 MENOPAUSE: ICD-10-CM

## 2022-10-19 PROCEDURE — 77080 DXA BONE DENSITY AXIAL: CPT

## 2022-11-08 ENCOUNTER — OFFICE VISIT (OUTPATIENT)
Dept: INTERNAL MEDICINE CLINIC | Age: 72
End: 2022-11-08
Payer: MEDICARE

## 2022-11-08 VITALS — WEIGHT: 283.8 LBS | HEIGHT: 65 IN | BODY MASS INDEX: 47.28 KG/M2

## 2022-11-08 DIAGNOSIS — E11.9 TYPE 2 DIABETES MELLITUS WITHOUT COMPLICATION, WITHOUT LONG-TERM CURRENT USE OF INSULIN (HCC): Primary | ICD-10-CM

## 2022-11-08 PROCEDURE — 97803 MED NUTRITION INDIV SUBSEQ: CPT | Performed by: DIETITIAN, REGISTERED

## 2022-11-08 RX ORDER — MULTIVIT WITH MINERALS/LUTEIN
1000 TABLET ORAL DAILY
COMMUNITY

## 2022-11-08 NOTE — PROGRESS NOTES
[address] [place] [address]. [address] [place] [address] Marco., [facility], [facility]  [phone] (phone)  [phone] (fax)    Patient Name: Esvin Clemens. Date of Birth: [birth year]. MRN: [id number]      Assessment: Patient is a 67 y.o. 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     -Nutritionally relevant labs:   Lab Results   Component Value Date/Time    LABA1C 5.6 03/23/2022 12:00 AM    LABA1C 5.9 09/21/2021 12:00 AM    LABA1C 5.8 03/22/2021 12:00 AM    GLUCOSE 114 (H) 10/05/2022 12:01 PM    GLUCOSE 131 (H) 03/24/2022 10:51 AM    CHOL 160 10/05/2022 12:01 PM    HDL 51 10/05/2022 12:01 PM    LDLCALC 86 10/05/2022 12:01 PM    TRIG 116 10/05/2022 12:01 PM     -Blood sugar trends: Checks BS ~ 1x/day at various times of the day. FBS:  102, 115, 125  Dinner:  [facility], 99, 126    -Food recall: Up late  Lunch:  2 slices [place] bread toast, protein shake OR frosted mini wheats cereal and sometimes with 1 English muffin, 4 sl ham OR turkey sims sub, vanilla doughnut, medium caramel latte   Dinner:  [place] ([facility]) sims wrap, chips and medium caramel latte OR Salad with sims and avocado, carrots and hard boiled egg OR protein shake OR 2 boiled eggs, 2 sl american cheese and English muffin OR [place] ([facility]) Sims ranch sub, chips  Evening Snack: cottage cheese with pineapple OR cinnamon roll, medium caramel latte OR oatmeal cookie    Pt stated that she made a healthy salad and enjoyed - spinach, kale, hard boiled egg and carrots    -Main Beverages: water.   Started using pedal exerciser while sitting.    -Impression of Dietary Intake: on average, 2 meals per day, low fat/ cholesterol, lacking routine intake of fruits and vegetables, frequent high sugar coffee drinks and snacks, frequent potato chips intake    Current Outpatient Medications on File Prior to Visit   Medication Sig Dispense Refill    Ascorbic Acid (VITAMIN C) 1000 MG tablet Take 1,000 mg by mouth daily      Zinc Sulfate (ZINC-220 PO) Take by mouth daily One tablet - unsure of dosage      ramipril (ALTACE) 10 MG capsule TAKE 1 CAPSULE BY MOUTH EVERY DAY      Multiple Vitamins-Minerals (MULTIVITAMIN ADULT EXTRA C PO)       Cholecalciferol (VITAMIN D3) 50 MCG (2000 UT) TABS Take by mouth daily      Black Elderberry (SAMBUCUS ELDERBERRY PO) Take 1,250 mg by mouth      atorvastatin (LIPITOR) 40 MG tablet Take 40 mg by mouth daily      NONFORMULARY Take 1 capsule by mouth daily [employer]      aspirin 81 MG EC tablet Take 81 mg by mouth daily      metoprolol tartrate (LOPRESSOR) 50 MG tablet Take 50 mg by mouth 2 times daily      alendronate (FOSAMAX) 35 MG tablet Take 35 mg by mouth every 7 days       No current facility-administered medications on file prior to visit. Vitals from current and previous visits:  Ht 5' 5\" (1.651 m)   Wt 283 lb 12.8 oz (128.7 kg)   BMI 47.23 kg/m²     -Body mass index is 47.23 kg/m². Greater than 40 - Morbid Obesity / Extreme Obesity / Grade III. - Patient maintained.  -Weight goal: lose weight. Nutrition Diagnosis:   Limited adherence to nutrition-related recommendations related to Lack of social support for implementing changes as evidenced by BMI of Body mass index is 47.23 kg/m². And patient report she gets tired of being alone and is not a good self motivator. Intervention:  -Impression: Pt has the same ol' story of wishing she had a  at home. She is  and does not have a significant other in her life. She keeps in close contact with her daughter and grandson and other grandchildren.    -Instructed the patient on: Meal Planning for Regular, Balanced Meals & Snacks and The Importance of Regular Physical Activity   -Handouts given for: eat more weigh less, fast food healthy guidelines, roasted vegetable recipes. Patient Instructions   Read the \"Eat More - Weigh Less\"  handout.     When looking up nutrition info on Eating out foods - try to keep total fat grams <20-25 gms fat/meal.    Add your greens and veggies into your sandwiches. Choose sugar free flavored coffees instead of the high sugar ones. Keep physical activity going to help with your weight loss efforts.  - Write it on your calendar on when you will do the Silvestre and the DVD exercise (highlight with different color markers)  - get up from sitting often. Keep making healthier low fat menu options when eating out and bringing home to eat  - add fresh fruit and veggies. Good job drinking water. For healthy kidneys - along with drinking your water also choose less processed foods and choose foods lower in sodium. Thanks for your food logging - bring again to next dietitian appt. -Nutrition prescription: 1400 - 1500 calories/day, <45 gms fat/day    Comprehension verified using teachback method. Monitoring/Evaluation:   -Followup visit: 4 mo with dietitian.   -Receptiveness to education/goals: Agreeable.  -Evaluation of education: Indicates understanding.  -Readiness to change: precontemplative- adding more fresh fruit and salads/veggies with meal planning, getting more active. -Expected compliance: fair. Thank you for your referral of this patient. Total time involved in direct patient education: 30 minutes for follow-up MNT visit.

## 2022-11-08 NOTE — PATIENT INSTRUCTIONS
Read the \"Eat More - Weigh Less\"  handout. When looking up nutrition info on Eating out foods - try to keep total fat grams <20-25 gms fat/meal.    Add your greens and veggies into your sandwiches. Choose sugar free flavored coffees instead of the high sugar ones. Keep physical activity going to help with your weight loss efforts.  - Write it on your calendar on when you will do the [place] and the DVD exercise (highlight with different color markers)  - get up from sitting often. Keep making healthier low fat menu options when eating out and bringing home to eat  - add fresh fruit and veggies. Good job drinking water. For healthy kidneys - along with drinking your water also choose less processed foods and choose foods lower in sodium. Thanks for your food logging - bring again to next dietitian appt.

## 2022-11-28 ENCOUNTER — OFFICE VISIT (OUTPATIENT)
Dept: CARDIOLOGY CLINIC | Age: 72
End: 2022-11-28
Payer: MEDICARE

## 2022-11-28 VITALS
BODY MASS INDEX: 46.82 KG/M2 | DIASTOLIC BLOOD PRESSURE: 90 MMHG | WEIGHT: 281 LBS | HEART RATE: 69 BPM | HEIGHT: 65 IN | RESPIRATION RATE: 18 BRPM | SYSTOLIC BLOOD PRESSURE: 168 MMHG

## 2022-11-28 DIAGNOSIS — I10 PRIMARY HYPERTENSION: Primary | ICD-10-CM

## 2022-11-28 DIAGNOSIS — R06.02 SOB (SHORTNESS OF BREATH) ON EXERTION: ICD-10-CM

## 2022-11-28 DIAGNOSIS — I27.20 PULMONARY HYPERTENSION (HCC): ICD-10-CM

## 2022-11-28 PROCEDURE — 93000 ELECTROCARDIOGRAM COMPLETE: CPT | Performed by: INTERNAL MEDICINE

## 2022-11-28 PROCEDURE — 1123F ACP DISCUSS/DSCN MKR DOCD: CPT | Performed by: INTERNAL MEDICINE

## 2022-11-28 PROCEDURE — 3017F COLORECTAL CA SCREEN DOC REV: CPT | Performed by: INTERNAL MEDICINE

## 2022-11-28 PROCEDURE — G8484 FLU IMMUNIZE NO ADMIN: HCPCS | Performed by: INTERNAL MEDICINE

## 2022-11-28 PROCEDURE — G8427 DOCREV CUR MEDS BY ELIG CLIN: HCPCS | Performed by: INTERNAL MEDICINE

## 2022-11-28 PROCEDURE — 99214 OFFICE O/P EST MOD 30 MIN: CPT | Performed by: INTERNAL MEDICINE

## 2022-11-28 PROCEDURE — 3078F DIAST BP <80 MM HG: CPT | Performed by: INTERNAL MEDICINE

## 2022-11-28 PROCEDURE — 3074F SYST BP LT 130 MM HG: CPT | Performed by: INTERNAL MEDICINE

## 2022-11-28 PROCEDURE — G8399 PT W/DXA RESULTS DOCUMENT: HCPCS | Performed by: INTERNAL MEDICINE

## 2022-11-28 PROCEDURE — 1036F TOBACCO NON-USER: CPT | Performed by: INTERNAL MEDICINE

## 2022-11-28 PROCEDURE — G8417 CALC BMI ABV UP PARAM F/U: HCPCS | Performed by: INTERNAL MEDICINE

## 2022-11-28 PROCEDURE — 1090F PRES/ABSN URINE INCON ASSESS: CPT | Performed by: INTERNAL MEDICINE

## 2022-11-28 RX ORDER — ATORVASTATIN CALCIUM 40 MG/1
40 TABLET, FILM COATED ORAL DAILY
Qty: 90 TABLET | Refills: 3 | Status: SHIPPED | OUTPATIENT
Start: 2022-11-28

## 2022-11-28 RX ORDER — RAMIPRIL 10 MG/1
CAPSULE ORAL
Qty: 90 CAPSULE | Refills: 3 | Status: SHIPPED | OUTPATIENT
Start: 2022-11-28

## 2022-11-28 RX ORDER — METOPROLOL TARTRATE 50 MG/1
50 TABLET, FILM COATED ORAL 2 TIMES DAILY
Qty: 180 TABLET | Refills: 3 | Status: SHIPPED | OUTPATIENT
Start: 2022-11-28

## 2022-11-28 ASSESSMENT — ENCOUNTER SYMPTOMS
BLOOD IN STOOL: 0
APNEA: 0
WHEEZING: 0
COUGH: 0
VOMITING: 0
ANAL BLEEDING: 0
ABDOMINAL PAIN: 0
NAUSEA: 0
CHOKING: 0
TROUBLE SWALLOWING: 0
SHORTNESS OF BREATH: 1
ABDOMINAL DISTENTION: 0
CHEST TIGHTNESS: 0
VOICE CHANGE: 0
COLOR CHANGE: 0
STRIDOR: 0

## 2022-11-28 NOTE — PROGRESS NOTES
[address]  Dept: [address]  Loc: [phone]     11/28/2022       Bhupinderangelic Reyes is here today for   Chief Complaint   Patient presents with    Follow-up           Referring Physician:  No ref. provider found     Patient Active Problem List   Diagnosis    Kidney stone    CAD in native artery    Hypoxia    Morbid obesity with BMI of 45.0-49.9, adult (HCC)    JENNY (obstructive sleep apnea)    Obesity hypoventilation syndrome (HCC)    Physical deconditioning    Restrictive lung disease    Pulmonary hypertension (HCC)    Pulmonary interstitial fibrosis (HCC)    Atelectasis of both lungs    COVID-19    Type 2 diabetes mellitus without complication, without long-term current use of insulin (HCC)    Primary hypertension    Obstructive sleep apnea syndrome    Sleep apnea    Adult body mass index 40 and over       Review of Systems   Constitutional:  Negative for activity change, appetite change, fatigue, fever and unexpected weight change. HENT:  Negative for congestion, trouble swallowing and voice change. Eyes:  Negative for visual disturbance. Respiratory:  Positive for shortness of breath. Negative for apnea, cough, choking, chest tightness, wheezing and stridor. Cardiovascular:  Negative for chest pain, palpitations and leg swelling. Gastrointestinal:  Negative for abdominal distention, abdominal pain, anal bleeding, blood in stool, nausea and vomiting. Endocrine: Negative for cold intolerance and heat intolerance. Genitourinary:  Negative for hematuria. Musculoskeletal:  Negative for arthralgias, gait problem, joint swelling and myalgias. Skin:  Negative for color change and rash. Allergic/Immunologic: Negative for environmental allergies and food allergies.    Neurological:  Negative for dizziness, tremors, syncope, facial asymmetry, weakness, light-headedness, numbness and headaches. Hematological:  Does not bruise/bleed easily. Psychiatric/Behavioral:  Negative for agitation, behavioral problems and sleep disturbance. Past Medical History:   Diagnosis Date    Anxiety     Arthritis     CAD (coronary artery disease)     GERD (gastroesophageal reflux disease)     History of kidney stones     Hyperlipidemia     Hypertension     Kidney stone 2011    Osteoarthritis     Sleep apnea        Allergies   Allergen Reactions    Other      Other reaction(s): Other  Other reaction(s): Unknown    Sulfa Antibiotics     Trulicity [Dulaglutide]      Kidney issue and tiredness. Dilaudid [Hydromorphone Hcl] Nausea And Vomiting    Toradol [Ketorolac Tromethamine] Other (See Comments)     Lethargic, felt \"weird\"    Tramadol Nausea And Vomiting and Other (See Comments)     Pt states this medication makes her Loopy       Current Outpatient Medications   Medication Sig Dispense Refill    ramipril (ALTACE) 10 MG capsule TAKE 1 CAPSULE BY MOUTH EVERY DAY 90 capsule 3    atorvastatin (LIPITOR) 40 MG tablet Take 1 tablet by mouth daily 90 tablet 3    metoprolol tartrate (LOPRESSOR) 50 MG tablet Take 1 tablet by mouth 2 times daily 180 tablet 3    Ascorbic Acid (VITAMIN C) 1000 MG tablet Take 1,000 mg by mouth daily      Zinc Sulfate (ZINC-220 PO) Take by mouth daily One tablet - unsure of dosage      Multiple Vitamins-Minerals (MULTIVITAMIN ADULT EXTRA C PO)       Cholecalciferol (VITAMIN D3) 50 MCG (2000 UT) TABS Take by mouth daily      Black Elderberry (SAMBUCUS ELDERBERRY PO) Take 1,250 mg by mouth      NONFORMULARY Take 1 capsule by mouth daily [employer]      aspirin 81 MG EC tablet Take 81 mg by mouth daily      alendronate (FOSAMAX) 35 MG tablet Take 35 mg by mouth every 7 days       No current facility-administered medications for this visit.        Social History     Socioeconomic History    Marital status:      Spouse name: None    Number of children: None    Years of education: None    Highest education level: None   Tobacco Use    Smoking status: Never    Smokeless tobacco: Never   Vaping Use    Vaping Use: Never used   Substance and Sexual Activity    Alcohol use: No    Drug use: No    Sexual activity: Not Currently       Family History   Problem Relation Age of Onset    Heart Attack Mother     Depression Mother     Obesity Mother     Arthritis Mother     Breast Cancer Mother 80    Heart Disease Father     High Blood Pressure Father     High Cholesterol Father     Obesity Father     Depression Sister     Obesity Sister     Depression Brother     Obesity Brother        Blood pressure (!) 168/90, pulse 69, resp. rate 18, height 5' 5\" (1.651 m), weight 281 lb (127.5 kg), not currently breastfeeding. Physical Exam:    General Appearance: alert and oriented to person, place and time, in no acute distress  Cardiovascular: normal rate, regular rhythm, normal S1 and S2, no murmurs, rubs, clicks, or gallops, distal pulses intact, no carotid bruits, no JVD  Pulmonary/Chest: clear to auscultation bilaterally- no wheezes, rales or rhonchi, normal air movement, no respiratory distress  Abdomen: soft, non-tender, non-distended, normal bowel sounds, no masses   Extremities: no cyanosis, clubbing or edema, pulse   Skin: warm and dry, no rash or erythema  Head: normocephalic and atraumatic  Eyes: pupils equal, round, and reactive to light  Neck: supple and non-tender without mass, no thyromegaly   Musculoskeletal: normal range of motion, no joint swelling, deformity or tenderness  Neurological: alert, oriented, normal speech, no focal findings or movement disorder noted    Lab Data:    Cardiac Enzymes:  No results for input(s): CKTOTAL, CKMB, CKMBINDEX, TROPONINI in the last 72 hours.     CBC:   Lab Results   Component Value Date/Time    WBC 4.9 11/21/2022 12:07 PM    RBC 5.17 11/21/2022 12:07 PM    RBC 4.96 01/25/2012 02:55 PM    HGB 15.9 11/21/2022 12:07 PM    HCT 47.7 11/21/2022 12:07 PM     11/21/2022 12:07 PM       CMP:    Lab Results   Component Value Date/Time     11/21/2022 12:07 PM    K 4.6 11/21/2022 12:07 PM     11/21/2022 12:07 PM    CO2 25 11/21/2022 12:07 PM    BUN 15 11/21/2022 12:07 PM    CREATININE 0.6 11/21/2022 12:07 PM    LABGLOM >60 11/21/2022 12:07 PM    GLUCOSE 120 11/21/2022 12:07 PM    CALCIUM 10.7 11/21/2022 12:07 PM       Hepatic Function Panel:    Lab Results   Component Value Date/Time    ALKPHOS 90 11/21/2022 12:07 PM    ALT 39 11/21/2022 12:07 PM    AST 32 11/21/2022 12:07 PM    PROT 6.4 11/21/2022 12:07 PM    BILITOT 1.2 11/21/2022 12:07 PM    BILIDIR <0.2 11/21/2022 12:07 PM    LABALBU 4.2 11/21/2022 12:07 PM    LABALBU 4.5 01/25/2012 02:55 PM       Magnesium:    Lab Results   Component Value Date/Time    MG 2.1 06/19/2017 03:12 PM       PT/INR:    Lab Results   Component Value Date/Time    INR 1.08 12/08/2017 02:26 PM       HgBA1c:    Lab Results   Component Value Date/Time    LABA1C 5.6 03/23/2022 12:00 AM       FLP:    Lab Results   Component Value Date/Time    TRIG 187 11/21/2022 12:07 PM    HDL 48 11/21/2022 12:07 PM    LDLCALC 115 11/21/2022 12:07 PM       TSH:    Lab Results   Component Value Date/Time    TSH 2.090 10/05/2022 12:01 PM        Diagnosis Orders   1. Primary hypertension  73183 - [place] ELECTROCARDIOGRAM, COMPLETE    Stress test, lexiscan      2. SOB (shortness of breath) on exertion  Stress test, lexiscan    ECHO Complete 2D W Doppler W Color      3. Body mass index (BMI) 45.0-49.9, adult (HCC)  Stress test, lexiscan      4. Pulmonary hypertension ([address]  Stress test, lexiscan           Assessment/Plan    Lawson Cat is 67years old lady history of nonobstructive coronary artery disease declined by heart cath back in 2017 patient is here she does complain of exertional shortness of breath could be equivocal to the angina pectoris stress test will be scheduled. The patient has an abnormal EKG and will get a Lexiscan on her.   She had occasional PVCs    The patient describes her shortness of breath when she tried to carry heavy objects and move with them. The patient has been having progression of her symptoms in the last year. She has a history of hypercholesterolemia her LDL is elevated but she admits she has not been taking her statin as she should be she was advised about taking medication and has been prescribed to have the best result. The patient is morbidly obese she was advised about diet and exercise    She does have history of pulmonary hypertension and history of sleep apnea she utilizes a CPAP and she will be followed by pulmonary service for that. Patient has a history of sclerotic change of the aortic valve. An echocardiogram will be obtained to evaluate her valvular heart disease stress Laura Gory will be performed patient will be seen after that pending the results of the above test further recommendation will be made. The patient advised about diet exercise. Medication were reconciled and sent to her pharmacy the EKG finding the lab findings were all discussed with the patient the plan of treatment discussed with her all her questions were answered to her satisfaction and she is to seek medical attention if she has any change in clinical condition thank    Orders Placed This Encounter   Procedures    Stress test, lexiscan     Meds to hold: none     Standing Status:   Future     Standing Expiration Date:   11/28/2023     Order Specific Question:   Reason for Exam?     Answer:   Chest pain     Order Specific Question:   Reason for Exam?     Answer:   Shortness of breath    ECHO Complete 2D W Doppler W Color     Standing Status:   Future     Standing Expiration Date:   11/28/2023     Order Specific Question:   Reason for exam:     Answer:   left vent function    33776 - [place] ELECTROCARDIOGRAM, COMPLETE       Return in about 3 months (around 2/28/2023) for sob.      Saniya Martell MD

## 2022-12-27 ENCOUNTER — HOSPITAL ENCOUNTER (OUTPATIENT)
Dept: NON INVASIVE DIAGNOSTICS | Age: 72
Discharge: HOME | End: 2022-12-27
Payer: MEDICARE

## 2022-12-27 VITALS — BODY MASS INDEX: 49.61 KG/M2 | WEIGHT: 280 LBS | HEIGHT: 63 IN

## 2022-12-27 DIAGNOSIS — I27.20 PULMONARY HYPERTENSION (HCC): ICD-10-CM

## 2022-12-27 DIAGNOSIS — R06.02 SOB (SHORTNESS OF BREATH) ON EXERTION: ICD-10-CM

## 2022-12-27 DIAGNOSIS — I10 PRIMARY HYPERTENSION: ICD-10-CM

## 2022-12-27 PROCEDURE — 93306 TTE W/DOPPLER COMPLETE: CPT

## 2022-12-27 PROCEDURE — 93017 CV STRESS TEST TRACING ONLY: CPT | Performed by: INTERNAL MEDICINE

## 2022-12-27 PROCEDURE — 6360000002 HC RX W HCPCS

## 2022-12-27 PROCEDURE — 78452 HT MUSCLE IMAGE SPECT MULT: CPT

## 2022-12-27 PROCEDURE — A9500 TC99M SESTAMIBI: HCPCS | Performed by: INTERNAL MEDICINE

## 2022-12-27 PROCEDURE — 3430000000 HC RX DIAGNOSTIC RADIOPHARMACEUTICAL: Performed by: INTERNAL MEDICINE

## 2022-12-27 RX ORDER — TECHNETIUM TC-99M SESTAMIBI 1 MG/10ML
9.2 INJECTION INTRAVENOUS
Status: COMPLETED | OUTPATIENT
Start: 2022-12-27 | End: 2022-12-27

## 2022-12-27 RX ORDER — TECHNETIUM TC-99M SESTAMIBI 1 MG/10ML
30.4 INJECTION INTRAVENOUS
Status: COMPLETED | OUTPATIENT
Start: 2022-12-27 | End: 2022-12-27

## 2022-12-27 RX ADMIN — Medication 30.4 MILLICURIE: at 15:21

## 2022-12-27 RX ADMIN — Medication 9.2 MILLICURIE: at 14:30

## 2023-02-08 ENCOUNTER — HOSPITAL ENCOUNTER (OUTPATIENT)
Dept: WOMENS IMAGING | Age: 73
Discharge: HOME | End: 2023-02-08
Payer: MEDICARE

## 2023-02-08 DIAGNOSIS — Z12.31 VISIT FOR SCREENING MAMMOGRAM: ICD-10-CM

## 2023-02-08 PROCEDURE — 77067 SCR MAMMO BI INCL CAD: CPT

## 2023-03-01 ENCOUNTER — OFFICE VISIT (OUTPATIENT)
Dept: CARDIOLOGY CLINIC | Age: 73
End: 2023-03-01
Payer: MEDICARE

## 2023-03-01 VITALS
HEART RATE: 65 BPM | BODY MASS INDEX: 49.82 KG/M2 | HEIGHT: 63 IN | WEIGHT: 281.2 LBS | RESPIRATION RATE: 18 BRPM | DIASTOLIC BLOOD PRESSURE: 80 MMHG | SYSTOLIC BLOOD PRESSURE: 171 MMHG

## 2023-03-01 DIAGNOSIS — I27.20 PULMONARY HYPERTENSION (HCC): ICD-10-CM

## 2023-03-01 DIAGNOSIS — E78.5 DYSLIPIDEMIA (HIGH LDL; LOW HDL): ICD-10-CM

## 2023-03-01 DIAGNOSIS — I10 PRIMARY HYPERTENSION: Primary | ICD-10-CM

## 2023-03-01 PROCEDURE — G8417 CALC BMI ABV UP PARAM F/U: HCPCS | Performed by: INTERNAL MEDICINE

## 2023-03-01 PROCEDURE — 3077F SYST BP >= 140 MM HG: CPT | Performed by: INTERNAL MEDICINE

## 2023-03-01 PROCEDURE — 1090F PRES/ABSN URINE INCON ASSESS: CPT | Performed by: INTERNAL MEDICINE

## 2023-03-01 PROCEDURE — 1123F ACP DISCUSS/DSCN MKR DOCD: CPT | Performed by: INTERNAL MEDICINE

## 2023-03-01 PROCEDURE — G8427 DOCREV CUR MEDS BY ELIG CLIN: HCPCS | Performed by: INTERNAL MEDICINE

## 2023-03-01 PROCEDURE — G8484 FLU IMMUNIZE NO ADMIN: HCPCS | Performed by: INTERNAL MEDICINE

## 2023-03-01 PROCEDURE — 93000 ELECTROCARDIOGRAM COMPLETE: CPT | Performed by: INTERNAL MEDICINE

## 2023-03-01 PROCEDURE — 1036F TOBACCO NON-USER: CPT | Performed by: INTERNAL MEDICINE

## 2023-03-01 PROCEDURE — 3079F DIAST BP 80-89 MM HG: CPT | Performed by: INTERNAL MEDICINE

## 2023-03-01 PROCEDURE — 99213 OFFICE O/P EST LOW 20 MIN: CPT | Performed by: INTERNAL MEDICINE

## 2023-03-01 PROCEDURE — 3017F COLORECTAL CA SCREEN DOC REV: CPT | Performed by: INTERNAL MEDICINE

## 2023-03-01 PROCEDURE — G8399 PT W/DXA RESULTS DOCUMENT: HCPCS | Performed by: INTERNAL MEDICINE

## 2023-03-01 ASSESSMENT — ENCOUNTER SYMPTOMS
COUGH: 0
ABDOMINAL DISTENTION: 0
VOMITING: 0
ABDOMINAL PAIN: 0
APNEA: 0
CHOKING: 0
NAUSEA: 0
COLOR CHANGE: 0
CHEST TIGHTNESS: 0
TROUBLE SWALLOWING: 0
VOICE CHANGE: 0
SHORTNESS OF BREATH: 1
WHEEZING: 0
ANAL BLEEDING: 0
STRIDOR: 0
BLOOD IN STOOL: 0

## 2023-03-01 NOTE — PROGRESS NOTES
[address] [facility] [address] [address]  Dept: [address]: [phone]     3/1/2023       Ramírez Thakkar is here today for   Chief Complaint   Patient presents with    Follow-up           Referring Physician:  No ref. provider found     Patient Active Problem List   Diagnosis    Kidney stone    CAD in native artery    Hypoxia    Morbid obesity with BMI of 45.0-49.9, adult (HCC)    JENNY (obstructive sleep apnea)    Obesity hypoventilation syndrome (HCC)    Physical deconditioning    Restrictive lung disease    Pulmonary hypertension (HCC)    Pulmonary interstitial fibrosis (HCC)    Atelectasis of both lungs    COVID-19    Type 2 diabetes mellitus without complication, without long-term current use of insulin (HCC)    Primary hypertension    Obstructive sleep apnea syndrome    Sleep apnea    Adult body mass index 40 and over       Review of Systems   Constitutional:  Negative for activity change, appetite change, fatigue, fever and unexpected weight change. HENT:  Negative for congestion, trouble swallowing and voice change. Eyes:  Negative for visual disturbance. Respiratory:  Positive for shortness of breath. Negative for apnea, cough, choking, chest tightness, wheezing and stridor. Cardiovascular:  Negative for chest pain, palpitations and leg swelling. Gastrointestinal:  Negative for abdominal distention, abdominal pain, anal bleeding, blood in stool, nausea and vomiting. Endocrine: Negative for cold intolerance and heat intolerance. Genitourinary:  Negative for hematuria. Musculoskeletal:  Negative for arthralgias, gait problem, joint swelling and myalgias. Skin:  Negative for color change and rash. Allergic/Immunologic: Negative for environmental allergies and food allergies.    Neurological:  Negative for dizziness, tremors, syncope, facial asymmetry, weakness, light-headedness, numbness and headaches. Hematological:  Does not bruise/bleed easily. Psychiatric/Behavioral:  Negative for agitation, behavioral problems and sleep disturbance. Past Medical History:   Diagnosis Date    Anxiety     Arthritis     CAD (coronary artery disease)     GERD (gastroesophageal reflux disease)     History of kidney stones     Hyperlipidemia     Hypertension     Kidney stone 2011    Osteoarthritis     Sleep apnea        Allergies   Allergen Reactions    Other      Other reaction(s): Other  Other reaction(s): Unknown    Sulfa Antibiotics     Trulicity [Dulaglutide]      Kidney issue and tiredness. Dilaudid [Hydromorphone Hcl] Nausea And Vomiting    Toradol [Ketorolac Tromethamine] Other (See Comments)     Lethargic, felt \"weird\"    Tramadol Nausea And Vomiting and Other (See Comments)     Pt states this medication makes her Loopy       Current Outpatient Medications   Medication Sig Dispense Refill    ramipril (ALTACE) 10 MG capsule TAKE 1 CAPSULE BY MOUTH EVERY DAY 90 capsule 3    atorvastatin (LIPITOR) 40 MG tablet Take 1 tablet by mouth daily 90 tablet 3    metoprolol tartrate (LOPRESSOR) 50 MG tablet Take 1 tablet by mouth 2 times daily 180 tablet 3    Ascorbic Acid (VITAMIN C) 1000 MG tablet Take 1,000 mg by mouth daily      Zinc Sulfate (ZINC-220 PO) Take by mouth daily One tablet - unsure of dosage      Multiple Vitamins-Minerals (MULTIVITAMIN ADULT EXTRA C PO)       Cholecalciferol (VITAMIN D3) 50 MCG (2000 UT) TABS Take by mouth daily      Black Elderberry (SAMBUCUS ELDERBERRY PO) Take 1,250 mg by mouth      aspirin 81 MG EC tablet Take 81 mg by mouth daily      alendronate (FOSAMAX) 35 MG tablet Take 35 mg by mouth every 7 days      NONFORMULARY Take 1 capsule by mouth daily [facility] (Patient not taking: Reported on 3/1/2023)       No current facility-administered medications for this visit.        Social History     Socioeconomic History    Marital status:      Spouse name: None    Number of children: None    Years of education: None    Highest education level: None   Tobacco Use    Smoking status: Never    Smokeless tobacco: Never   Vaping Use    Vaping Use: Never used   Substance and Sexual Activity    Alcohol use: No    Drug use: No    Sexual activity: Not Currently       Family History   Problem Relation Age of Onset    Heart Attack Mother     Depression Mother     Obesity Mother     Arthritis Mother     Breast Cancer Mother 80    Heart Disease Father     High Blood Pressure Father     High Cholesterol Father     Obesity Father     Depression Sister     Obesity Sister     Depression Brother     Obesity Brother        Blood pressure (!) 171/80, pulse 65, resp. rate 18, height 5' 3\" (1.6 m), weight 281 lb 3.2 oz (127.6 kg), not currently breastfeeding. Physical Exam:    General Appearance: alert and oriented to person, place and time, in no acute distress  Cardiovascular: normal rate, regular rhythm, normal S1 and S2, no murmurs, rubs, clicks, or gallops, distal pulses intact, no carotid bruits, no JVD  Pulmonary/Chest: clear to auscultation bilaterally- no wheezes, rales or rhonchi, normal air movement, no respiratory distress  Abdomen: soft, non-tender, non-distended, normal bowel sounds, no masses   Extremities: no cyanosis, clubbing or edema, pulse   Skin: warm and dry, no rash or erythema  Head: normocephalic and atraumatic  Eyes: pupils equal, round, and reactive to light  Neck: supple and non-tender without mass, no thyromegaly   Musculoskeletal: normal range of motion, no joint swelling, deformity or tenderness  Neurological: alert, oriented, normal speech, no focal findings or movement disorder noted    Lab Data:    Cardiac Enzymes:  No results for input(s): CKTOTAL, CKMB, CKMBINDEX, TROPONINI in the last 72 hours.     CBC:   Lab Results   Component Value Date/Time    WBC 4.9 11/21/2022 12:07 PM    RBC 5.17 11/21/2022 12:07 PM    RBC 4.96 01/25/2012 02:55 PM    HGB 15.9 11/21/2022 12:07 PM    HCT 47.7 11/21/2022 12:07 PM     11/21/2022 12:07 PM       CMP:    Lab Results   Component Value Date/Time     11/21/2022 12:07 PM    K 4.6 11/21/2022 12:07 PM     11/21/2022 12:07 PM    CO2 25 11/21/2022 12:07 PM    BUN 15 11/21/2022 12:07 PM    CREATININE 0.6 11/21/2022 12:07 PM    LABGLOM >60 11/21/2022 12:07 PM    GLUCOSE 120 11/21/2022 12:07 PM    CALCIUM 10.7 11/21/2022 12:07 PM       Hepatic Function Panel:    Lab Results   Component Value Date/Time    ALKPHOS 90 11/21/2022 12:07 PM    ALT 39 11/21/2022 12:07 PM    AST 32 11/21/2022 12:07 PM    PROT 6.4 11/21/2022 12:07 PM    BILITOT 1.2 11/21/2022 12:07 PM    BILIDIR <0.2 11/21/2022 12:07 PM    LABALBU 4.2 11/21/2022 12:07 PM    LABALBU 4.5 01/25/2012 02:55 PM       Magnesium:    Lab Results   Component Value Date/Time    MG 2.1 06/19/2017 03:12 PM       PT/INR:    Lab Results   Component Value Date/Time    INR 1.08 12/08/2017 02:26 PM       HgBA1c:    Lab Results   Component Value Date/Time    LABA1C 5.6 03/23/2022 12:00 AM       FLP:    Lab Results   Component Value Date/Time    TRIG 187 11/21/2022 12:07 PM    HDL 48 11/21/2022 12:07 PM    LDLCALC 115 11/21/2022 12:07 PM       TSH:    Lab Results   Component Value Date/Time    TSH 2.090 10/05/2022 12:01 PM        Diagnosis Orders   1. Primary hypertension  86555 - [place] ELECTROCARDIOGRAM, COMPLETE    CBC    Basic Metabolic Panel    Lipid Panel    Hepatic Function Panel      2. Dyslipidemia (high LDL; low HDL)  CBC    Basic Metabolic Panel    Lipid Panel    Hepatic Function Panel      3. Body mass index (BMI) 45.0-49.9, adult (HCC)        4. Pulmonary hypertension (HCC)             Assessment/Plan    Is a 67years old lady morbidly obese lady history of hypertension hyper cholesteremia and the sleep apnea she utilize her CPAP.   Major issue is she will go she gets shortness of breath with physical activity she did had an echo which was okay the patient had a stress test was negative for ischemia. Her EKG showed sinus rhythm blood pressure slightly elevated but she is to check her blood pressure at home and follow-up with family physician to adjust medication she was advised about diet exercise and weight reduction the patient will be seen annually patient seek medical attention should any change in clinical condition cardiac wise she is stable thank    Orders Placed This Encounter   Procedures    CBC     Standing Status:   Future     Standing Expiration Date:   6/4/2576    Basic Metabolic Panel     Standing Status:   Future     Standing Expiration Date:   3/1/2024    Lipid Panel     Standing Status:   Future     Standing Expiration Date:   3/1/2024     Order Specific Question:   Is Patient Fasting?/# of Hours     Answer:   12 hours    Hepatic Function Panel     Standing Status:   Future     Standing Expiration Date:   3/1/2024    [id number] - [place] ELECTROCARDIOGRAM, COMPLETE       Return in about 1 year (around 3/1/2024) for cad.      Jace Whaley MD

## 2023-03-15 ENCOUNTER — OFFICE VISIT (OUTPATIENT)
Dept: INTERNAL MEDICINE CLINIC | Age: 73
End: 2023-03-15

## 2023-03-15 VITALS — WEIGHT: 282.8 LBS | HEIGHT: 65 IN | BODY MASS INDEX: 47.12 KG/M2

## 2023-03-15 DIAGNOSIS — E11.9 TYPE 2 DIABETES MELLITUS WITHOUT COMPLICATION, WITHOUT LONG-TERM CURRENT USE OF INSULIN (HCC): Primary | ICD-10-CM

## 2023-03-15 NOTE — PATIENT INSTRUCTIONS
If wanting to eat a convenient frozen meal, find one with <500 mg sodium/meal and <15 gms fat/meal.    Follow the plate method when eating a meal.  - like that you are thinking about those fresh fruit and veggies and having with meals. Eat 3 meals/day. Schedule in your Yeison Rhiannon time on your calendar. Good job drinking water. Start logging your [employer] tj on your phone.

## 2023-03-15 NOTE — PROGRESS NOTES
[address] [place] [address]. [address] [place] [address] Marco., [facility], [facility]  [phone] (phone)  [phone] (fax)    Patient Name: Jolanta Oden. Date of Birth: [birth year]. MRN: [id number]      Assessment: Patient is a 67 y.o. 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     -Nutritionally relevant labs:   Lab Results   Component Value Date/Time    LABA1C 5.6 03/23/2022 12:00 AM    LABA1C 5.9 09/21/2021 12:00 AM    LABA1C 5.8 03/22/2021 12:00 AM    GLUCOSE 120 (H) 11/21/2022 12:07 PM    GLUCOSE 114 (H) 10/05/2022 12:01 PM    CHOL 200 (H) 11/21/2022 12:07 PM    HDL 48 11/21/2022 12:07 PM    LDLCALC 115 11/21/2022 12:07 PM    TRIG 187 11/21/2022 12:07 PM     March 2-11th no sweets: BS 80 - 112. Other times:  BS 99 - 134. -Food recall: 5 day food log  Breakfast: Mixed frozen fruit OR banana. Lunch: Amaya Mccreedy - grilled chicken, mashed pot and gravy, 2 dinner rolls with added butter, broccoli OR Breaded chicken laurence, mixed veggies or [place] sprouts, cheese and one day with a plum  Dinner: HB with cheese, 2 thin slices of [place] bread, b sprouts, plum OR chicken laurence and pear OR protein drink OR 2 thin slices [place] bread, with sims, plum. Snacks: no snacks on food logging.    -Main Beverages: water - drinks 2- 32 oz containers/day sometimes less and sometimes more. Denies drinking the high sugar coffees.    -Impression of Dietary Intake: in general, a \"healthy\" diet  , on average, 3 meals per day, lacking whole grains and non-fat dairy.     Current Outpatient Medications on File Prior to Visit   Medication Sig Dispense Refill    NONFORMULARY 827 mg 2 times daily EFA Plus Fatty Acids      ramipril (ALTACE) 10 MG capsule TAKE 1 CAPSULE BY MOUTH EVERY DAY 90 capsule 3    atorvastatin (LIPITOR) 40 MG tablet Take 1 tablet by mouth daily 90 tablet 3    metoprolol tartrate (LOPRESSOR) 50 MG tablet Take 1 tablet by mouth 2 times daily 180 tablet 3    Ascorbic Acid (VITAMIN C) 1000 MG tablet Take 1,000 mg by mouth daily      Zinc Sulfate (ZINC-220 PO) Take by mouth daily One tablet - unsure of dosage      Multiple Vitamins-Minerals (MULTIVITAMIN ADULT EXTRA C PO)       Cholecalciferol (VITAMIN D3) 50 MCG (2000 UT) TABS Take by mouth daily      Black Elderberry (SAMBUCUS ELDERBERRY PO) Take 1,250 mg by mouth      NONFORMULARY Take 1 capsule by mouth daily [facility]      aspirin 81 MG EC tablet Take 81 mg by mouth daily      alendronate (FOSAMAX) 35 MG tablet Take 35 mg by mouth every 7 days       No current facility-administered medications on file prior to visit. Vitals from current and previous visits:  Ht 5' 5\" (1.651 m)   Wt 282 lb 12.8 oz (128.3 kg)   BMI 47.06 kg/m²     -Body mass index is 47.06 kg/m². Greater than 40 - Morbid Obesity / Extreme Obesity / Grade III. - Patient maintained.  -Weight goal: lose weight. Nutrition Diagnosis:   Obesity related to currently undergoing MNT as evidenced by weight maintaining. Intervention:  -Impression: We were able to reset her password for [employer] so she can track her food intake on a continuous basis.    -Instructed the patient on: Meal Planning for Regular, Balanced Meals & Snacks and The Importance of Regular Physical Activity, plate method    -Handouts given for: sample food log. Patient Instructions   If wanting to eat a convenient frozen meal, find one with <500 mg sodium/meal and <15 gms fat/meal.    Follow the plate method when eating a meal.  - like that you are thinking about those fresh fruit and veggies and having with meals. Eat 3 meals/day. Schedule in your Tip or Skip Dears time on your calendar. Good job drinking water. Start logging your [employer] tj on your phone.    -Nutrition prescription: 1400 - 1500 calories/day, <45 gms fat/day. Comprehension verified using teachback method.     Monitoring/Evaluation:   -Followup visit: 8 weeks with dietitian.   -Receptiveness to education/goals: Agreeable.  -Evaluation of education: Indicates understanding.  -Readiness to change: action - ready to set action plan and implement getting back with your [employer] for tracking.  -Expected compliance: good. Thank you for your referral of this patient. Total time involved in direct patient education: 45 minutes for follow-up MNT visit.

## 2023-11-16 RX ORDER — ATORVASTATIN CALCIUM 40 MG/1
40 TABLET, FILM COATED ORAL DAILY
Qty: 90 TABLET | Refills: 1 | Status: SHIPPED | OUTPATIENT
Start: 2023-11-16

## 2023-11-20 RX ORDER — METOPROLOL TARTRATE 50 MG/1
50 TABLET, FILM COATED ORAL 2 TIMES DAILY
Qty: 180 TABLET | Refills: 1 | Status: SHIPPED | OUTPATIENT
Start: 2023-11-20

## 2023-11-20 RX ORDER — RAMIPRIL 10 MG/1
CAPSULE ORAL
Qty: 90 CAPSULE | Refills: 1 | Status: SHIPPED | OUTPATIENT
Start: 2023-11-20

## 2024-02-09 ENCOUNTER — HOSPITAL ENCOUNTER (OUTPATIENT)
Dept: WOMENS IMAGING | Age: 74
Discharge: HOME | End: 2024-02-09
Payer: MEDICARE

## 2024-02-09 VITALS — WEIGHT: 280 LBS | BODY MASS INDEX: 46.65 KG/M2 | HEIGHT: 65 IN

## 2024-02-09 DIAGNOSIS — Z12.31 VISIT FOR SCREENING MAMMOGRAM: ICD-10-CM

## 2024-02-09 PROCEDURE — 77063 BREAST TOMOSYNTHESIS BI: CPT

## 2024-02-22 ENCOUNTER — OFFICE VISIT (OUTPATIENT)
Dept: FAMILY MEDICINE CLINIC | Age: 74
End: 2024-02-22
Payer: MEDICARE

## 2024-02-22 VITALS
DIASTOLIC BLOOD PRESSURE: 74 MMHG | WEIGHT: 286 LBS | BODY MASS INDEX: 47.59 KG/M2 | TEMPERATURE: 98.3 F | HEART RATE: 60 BPM | SYSTOLIC BLOOD PRESSURE: 136 MMHG | RESPIRATION RATE: 16 BRPM

## 2024-02-22 DIAGNOSIS — G47.33 OSA (OBSTRUCTIVE SLEEP APNEA): ICD-10-CM

## 2024-02-22 DIAGNOSIS — I10 PRIMARY HYPERTENSION: Primary | ICD-10-CM

## 2024-02-22 DIAGNOSIS — E11.9 TYPE 2 DIABETES MELLITUS WITHOUT COMPLICATION, WITHOUT LONG-TERM CURRENT USE OF INSULIN (HCC): ICD-10-CM

## 2024-02-22 DIAGNOSIS — E78.2 MIXED HYPERLIPIDEMIA: ICD-10-CM

## 2024-02-22 DIAGNOSIS — I25.10 CAD IN NATIVE ARTERY: ICD-10-CM

## 2024-02-22 DIAGNOSIS — J84.10 PULMONARY INTERSTITIAL FIBROSIS (HCC): ICD-10-CM

## 2024-02-22 DIAGNOSIS — I27.20 PULMONARY HYPERTENSION (HCC): ICD-10-CM

## 2024-02-22 PROBLEM — U07.1 COVID-19: Status: RESOLVED | Noted: 2021-12-22 | Resolved: 2024-02-22

## 2024-02-22 PROBLEM — J98.4 RESTRICTIVE LUNG DISEASE: Status: RESOLVED | Noted: 2018-04-09 | Resolved: 2024-02-22

## 2024-02-22 PROBLEM — R53.81 PHYSICAL DECONDITIONING: Status: RESOLVED | Noted: 2018-04-09 | Resolved: 2024-02-22

## 2024-02-22 PROBLEM — G47.30 SLEEP APNEA: Status: RESOLVED | Noted: 2022-05-19 | Resolved: 2024-02-22

## 2024-02-22 PROBLEM — J98.11 ATELECTASIS OF BOTH LUNGS: Status: RESOLVED | Noted: 2018-04-09 | Resolved: 2024-02-22

## 2024-02-22 PROCEDURE — 3046F HEMOGLOBIN A1C LEVEL >9.0%: CPT | Performed by: NURSE PRACTITIONER

## 2024-02-22 PROCEDURE — 2022F DILAT RTA XM EVC RTNOPTHY: CPT | Performed by: NURSE PRACTITIONER

## 2024-02-22 PROCEDURE — G8427 DOCREV CUR MEDS BY ELIG CLIN: HCPCS | Performed by: NURSE PRACTITIONER

## 2024-02-22 PROCEDURE — 3078F DIAST BP <80 MM HG: CPT | Performed by: NURSE PRACTITIONER

## 2024-02-22 PROCEDURE — 1036F TOBACCO NON-USER: CPT | Performed by: NURSE PRACTITIONER

## 2024-02-22 PROCEDURE — 3017F COLORECTAL CA SCREEN DOC REV: CPT | Performed by: NURSE PRACTITIONER

## 2024-02-22 PROCEDURE — G8417 CALC BMI ABV UP PARAM F/U: HCPCS | Performed by: NURSE PRACTITIONER

## 2024-02-22 PROCEDURE — 1090F PRES/ABSN URINE INCON ASSESS: CPT | Performed by: NURSE PRACTITIONER

## 2024-02-22 PROCEDURE — 1123F ACP DISCUSS/DSCN MKR DOCD: CPT | Performed by: NURSE PRACTITIONER

## 2024-02-22 PROCEDURE — 99204 OFFICE O/P NEW MOD 45 MIN: CPT | Performed by: NURSE PRACTITIONER

## 2024-02-22 PROCEDURE — G8484 FLU IMMUNIZE NO ADMIN: HCPCS | Performed by: NURSE PRACTITIONER

## 2024-02-22 PROCEDURE — G8399 PT W/DXA RESULTS DOCUMENT: HCPCS | Performed by: NURSE PRACTITIONER

## 2024-02-22 PROCEDURE — 3075F SYST BP GE 130 - 139MM HG: CPT | Performed by: NURSE PRACTITIONER

## 2024-02-22 RX ORDER — OMEGA-3 FATTY ACIDS/FISH OIL 300-1000MG
CAPSULE ORAL
COMMUNITY

## 2024-02-22 RX ORDER — BUPROPION HYDROCHLORIDE 150 MG/1
TABLET ORAL
COMMUNITY
Start: 2024-01-26

## 2024-02-22 SDOH — ECONOMIC STABILITY: FOOD INSECURITY: WITHIN THE PAST 12 MONTHS, THE FOOD YOU BOUGHT JUST DIDN'T LAST AND YOU DIDN'T HAVE MONEY TO GET MORE.: NEVER TRUE

## 2024-02-22 SDOH — ECONOMIC STABILITY: HOUSING INSECURITY
IN THE LAST 12 MONTHS, WAS THERE A TIME WHEN YOU DID NOT HAVE A STEADY PLACE TO SLEEP OR SLEPT IN A SHELTER (INCLUDING NOW)?: NO

## 2024-02-22 SDOH — ECONOMIC STABILITY: INCOME INSECURITY: HOW HARD IS IT FOR YOU TO PAY FOR THE VERY BASICS LIKE FOOD, HOUSING, MEDICAL CARE, AND HEATING?: NOT HARD AT ALL

## 2024-02-22 SDOH — ECONOMIC STABILITY: FOOD INSECURITY: WITHIN THE PAST 12 MONTHS, YOU WORRIED THAT YOUR FOOD WOULD RUN OUT BEFORE YOU GOT MONEY TO BUY MORE.: NEVER TRUE

## 2024-02-22 ASSESSMENT — ENCOUNTER SYMPTOMS
EYE PAIN: 0
TROUBLE SWALLOWING: 0
DIARRHEA: 0
BACK PAIN: 0
NAUSEA: 0
COUGH: 0
SINUS PAIN: 0
WHEEZING: 0
SORE THROAT: 0
FACIAL SWELLING: 0
VOMITING: 0
COLOR CHANGE: 0
SHORTNESS OF BREATH: 0
ABDOMINAL PAIN: 0

## 2024-02-22 ASSESSMENT — PATIENT HEALTH QUESTIONNAIRE - PHQ9
SUM OF ALL RESPONSES TO PHQ9 QUESTIONS 1 & 2: 0
2. FEELING DOWN, DEPRESSED OR HOPELESS: 0
SUM OF ALL RESPONSES TO PHQ QUESTIONS 1-9: 0
1. LITTLE INTEREST OR PLEASURE IN DOING THINGS: 0
SUM OF ALL RESPONSES TO PHQ QUESTIONS 1-9: 0

## 2024-02-22 NOTE — PROGRESS NOTES
[facility]  [facility]  [facility]  [address] [facility] [address]  Dept: [phone]  Dept Fax: [phone]  Loc: [phone]     2024    Dara Sanz (:  1950) is a 73 y.o. female, here for evaluation of the following medical concerns:  Chief Complaint   Patient presents with    New Patient     Here to get established as a a new patient with no concerns at this time. Last PCP was Connie Hernandez.       Pt presents to the office today for new patient appt.  She was previously with TONJA Staples CNP.  Pt follows up with [facility] diabetic clinic and Dr Fonseca/Rell for cardiology. Doing well overall.  Having some weakness and is moving a lot slower than she used to, she has plans to work on getting stronger.  She is trying to lose weight and went on Trulicity to help with this, but it did not work and she had side effects.        Treatment Adherence:   Medication compliance:  compliant all of the time  Diet compliance:  compliant most of the time  Weight trend: stable  Current exercise: no regular exercise  Barriers: impairment:  physical: overall weakness and fear of falling. She does have a [place] and she plans on working out on it soon.     Diabetes Mellitus Type 2: Current symptoms/problems include none.    Home blood sugar records: patient does not test  Any episodes of hypoglycemia? no  Eye exam current (within one year): yes  Tobacco history: She  reports that she has never smoked. She has never used smokeless tobacco.   Daily Aspirin? Yes    Hypertension:  Home blood pressure monitoring: No.  She is adherent to a low sodium diet. Patient denies chest pain, shortness of breath, and headache.  Antihypertensive medication side effects: no medication side effects noted.  Use of agents associated with hypertension: none.     Hyperlipidemia:  No new myalgias or GI upset on atorvastatin (Lipitor).       Lab Results   Component Value Date    LABA1C 5.6 2023

## 2024-03-12 ENCOUNTER — OFFICE VISIT (OUTPATIENT)
Dept: CARDIOLOGY CLINIC | Age: 74
End: 2024-03-12

## 2024-03-12 VITALS
DIASTOLIC BLOOD PRESSURE: 82 MMHG | HEART RATE: 73 BPM | HEIGHT: 65 IN | SYSTOLIC BLOOD PRESSURE: 138 MMHG | WEIGHT: 287 LBS | BODY MASS INDEX: 47.82 KG/M2

## 2024-03-12 DIAGNOSIS — R06.02 SOB (SHORTNESS OF BREATH) ON EXERTION: ICD-10-CM

## 2024-03-12 DIAGNOSIS — I10 PRIMARY HYPERTENSION: Primary | ICD-10-CM

## 2024-03-12 RX ORDER — METOPROLOL TARTRATE 50 MG/1
50 TABLET, FILM COATED ORAL 2 TIMES DAILY
Qty: 180 TABLET | Refills: 3 | Status: SHIPPED | OUTPATIENT
Start: 2024-03-12

## 2024-03-12 RX ORDER — RAMIPRIL 10 MG/1
10 CAPSULE ORAL DAILY
Qty: 90 CAPSULE | Refills: 3 | Status: SHIPPED | OUTPATIENT
Start: 2024-03-12

## 2024-03-12 RX ORDER — ATORVASTATIN CALCIUM 40 MG/1
40 TABLET, FILM COATED ORAL DAILY
Qty: 90 TABLET | Refills: 3 | Status: SHIPPED | OUTPATIENT
Start: 2024-03-12

## 2024-03-12 NOTE — PATIENT INSTRUCTIONS
Your  Nurses  Today:  [place]   Your Provider for Today: Dr. Zacarias     You may receive a survey regarding the care you received during your visit.  Your input is valuable to us.  We encourage you to complete and return your survey.  We hope you will choose us in the future for your healthcare needs.

## 2024-04-09 ENCOUNTER — TELEPHONE (OUTPATIENT)
Dept: CARDIOLOGY CLINIC | Age: 74
End: 2024-04-09

## 2024-04-09 NOTE — TELEPHONE ENCOUNTER
Received a fax from [employer].  Patient needs clearance to donate plasma.    Can patient be cleared?    Forms I Dr. Zacarias's box.

## 2024-04-23 ENCOUNTER — HOSPITAL ENCOUNTER (EMERGENCY)
Age: 74
Discharge: HOME | End: 2024-04-23
Payer: MEDICARE

## 2024-04-23 ENCOUNTER — APPOINTMENT (OUTPATIENT)
Dept: GENERAL RADIOLOGY | Age: 74
End: 2024-04-23
Payer: MEDICARE

## 2024-04-23 VITALS
DIASTOLIC BLOOD PRESSURE: 103 MMHG | SYSTOLIC BLOOD PRESSURE: 157 MMHG | RESPIRATION RATE: 14 BRPM | OXYGEN SATURATION: 96 % | HEART RATE: 72 BPM | HEIGHT: 65 IN | WEIGHT: 280 LBS | BODY MASS INDEX: 46.65 KG/M2 | TEMPERATURE: 98 F

## 2024-04-23 DIAGNOSIS — S60.222A HEMATOMA OF LEFT HAND: ICD-10-CM
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
## 2024-04-23 DIAGNOSIS — S69.92XA INJURY OF LEFT HAND, INITIAL ENCOUNTER: ICD-10-CM

## 2024-04-23 PROCEDURE — 99213 OFFICE O/P EST LOW 20 MIN: CPT

## 2024-04-23 PROCEDURE — 73130 X-RAY EXAM OF HAND: CPT

## 2024-04-23 RX ORDER — ARNICA MONTANA 1 [HP_X]/G
1 CREAM TOPICAL 2 TIMES DAILY
Qty: 70 G | Refills: 1 | Status: SHIPPED | OUTPATIENT
Start: 2024-04-23

## 2024-04-23 ASSESSMENT — PAIN DESCRIPTION - ORIENTATION: ORIENTATION: LEFT

## 2024-04-23 ASSESSMENT — PAIN DESCRIPTION - LOCATION: LOCATION: HAND

## 2024-04-23 ASSESSMENT — PAIN SCALES - GENERAL: PAINLEVEL_OUTOF10: 1

## 2024-04-23 ASSESSMENT — PAIN - FUNCTIONAL ASSESSMENT: PAIN_FUNCTIONAL_ASSESSMENT: 0-10
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
## 2024-04-23 NOTE — DISCHARGE INSTRUCTIONS
Okay to take OTC Tylenol for pain/discomfort.  Continue to ice hand as you were during your urgent care stay.  If your swelling worsens/increases or becomes painful please obtain ER for potential imaging including possible CT with contrast.    Adhere to RICE precautions outlined in this discharge paperwork to help minimize pain/discomfort and swelling.    I hope you are feeling better soon!
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
## 2024-06-24 ENCOUNTER — ANESTHESIA (OUTPATIENT)
Dept: OPERATING ROOM | Age: 74
End: 2024-06-24
Payer: MEDICARE

## 2024-06-24 ENCOUNTER — ANESTHESIA EVENT (OUTPATIENT)
Dept: OPERATING ROOM | Age: 74
End: 2024-06-24
Payer: MEDICARE

## 2024-06-24 ENCOUNTER — APPOINTMENT (OUTPATIENT)
Dept: CT IMAGING | Age: 74
End: 2024-06-24
Payer: MEDICARE

## 2024-06-24 ENCOUNTER — HOSPITAL ENCOUNTER (OUTPATIENT)
Age: 74
Setting detail: OBSERVATION
Discharge: HOME | End: 2024-06-25
Attending: STUDENT IN AN ORGANIZED HEALTH CARE EDUCATION/TRAINING PROGRAM
Payer: MEDICARE

## 2024-06-24 DIAGNOSIS — N23 RENAL COLIC: Primary | ICD-10-CM

## 2024-06-24 PROBLEM — R10.9 RIGHT FLANK PAIN: Status: ACTIVE | Noted: 2024-06-24

## 2024-06-24 LAB
ALBUMIN SERPL BCG-MCNC: 4 G/DL (ref 3.5–5.1)
ALP SERPL-CCNC: 71 U/L (ref 38–126)
ALT SERPL W/O P-5'-P-CCNC: 29 U/L (ref 11–66)
ANION GAP SERPL CALC-SCNC: 9 MEQ/L (ref 8–16)
AST SERPL-CCNC: 25 U/L (ref 5–40)
BACTERIA: ABNORMAL
BASOPHILS ABSOLUTE: 0 THOU/MM3 (ref 0–0.1)
BASOPHILS NFR BLD AUTO: 0.6 %
BILIRUB SERPL-MCNC: 1 MG/DL (ref 0.3–1.2)
BILIRUB UR QL STRIP: NEGATIVE
BUN SERPL-MCNC: 19 MG/DL (ref 7–22)
CALCIUM SERPL-MCNC: 10.3 MG/DL (ref 8.5–10.5)
CASTS #/AREA URNS LPF: ABNORMAL /LPF
CASTS #/AREA URNS LPF: ABNORMAL /LPF
CHARACTER UR: CLEAR
CHARCOAL URNS QL MICRO: ABNORMAL
CHLORIDE SERPL-SCNC: 104 MEQ/L (ref 98–111)
CO2 SERPL-SCNC: 26 MEQ/L (ref 23–33)
COLOR UR: YELLOW
CREAT SERPL-MCNC: 0.9 MG/DL (ref 0.4–1.2)
CRYSTALS URNS QL MICRO: ABNORMAL
DEPRECATED RDW RBC AUTO: 41.9 FL (ref 35–45)
EKG ATRIAL RATE: 73 BPM
EKG P AXIS: 53 DEGREES
EKG P-R INTERVAL: 176 MS
EKG Q-T INTERVAL: 396 MS
EKG QRS DURATION: 78 MS
EKG QTC CALCULATION (BAZETT): 436 MS
EKG R AXIS: 18 DEGREES
EKG T AXIS: 47 DEGREES
EKG VENTRICULAR RATE: 73 BPM
EOSINOPHIL NFR BLD AUTO: 1 %
EOSINOPHILS ABSOLUTE: 0.1 THOU/MM3 (ref 0–0.4)
EPITHELIAL CELLS, UA: ABNORMAL /HPF
ERYTHROCYTE [DISTWIDTH] IN BLOOD BY AUTOMATED COUNT: 12.5 % (ref 11.5–14.5)
GFR SERPL CREATININE-BSD FRML MDRD: 67 ML/MIN/1.73M2
GLUCOSE BLD STRIP.AUTO-MCNC: 142 MG/DL (ref 70–108)
GLUCOSE SERPL-MCNC: 181 MG/DL (ref 70–108)
GLUCOSE UR QL STRIP.AUTO: NEGATIVE MG/DL
HCT VFR BLD AUTO: 46 % (ref 37–47)
HGB BLD-MCNC: 15.5 GM/DL (ref 12–16)
HGB UR QL STRIP.AUTO: ABNORMAL
IMM GRANULOCYTES # BLD AUTO: 0.07 THOU/MM3 (ref 0–0.07)
IMM GRANULOCYTES NFR BLD AUTO: 1 %
KETONES UR QL STRIP.AUTO: NEGATIVE
LEUKOCYTE ESTERASE UR QL STRIP.AUTO: NEGATIVE
LIPASE SERPL-CCNC: 25.6 U/L (ref 5.6–51.3)
LYMPHOCYTES ABSOLUTE: 1.7 THOU/MM3 (ref 1–4.8)
LYMPHOCYTES NFR BLD AUTO: 23.7 %
MCH RBC QN AUTO: 31.1 PG (ref 26–33)
MCHC RBC AUTO-ENTMCNC: 33.7 GM/DL (ref 32.2–35.5)
MCV RBC AUTO: 92.4 FL (ref 81–99)
MONOCYTES ABSOLUTE: 0.5 THOU/MM3 (ref 0.4–1.3)
MONOCYTES NFR BLD AUTO: 6.9 %
NEUTROPHILS ABSOLUTE: 4.9 THOU/MM3 (ref 1.8–7.7)
NEUTROPHILS NFR BLD AUTO: 66.8 %
NITRITE UR QL STRIP.AUTO: NEGATIVE
NRBC BLD AUTO-RTO: 0 /100 WBC
OSMOLALITY SERPL CALC.SUM OF ELEC: 284.4 MOSMOL/KG (ref 275–300)
PH UR STRIP.AUTO: 7.5 [PH] (ref 5–9)
PLATELET # BLD AUTO: 182 THOU/MM3 (ref 130–400)
PMV BLD AUTO: 10.1 FL (ref 9.4–12.4)
POTASSIUM SERPL-SCNC: 4 MEQ/L (ref 3.5–5.2)
PROT SERPL-MCNC: 6.5 G/DL (ref 6.1–8)
PROT UR STRIP.AUTO-MCNC: NEGATIVE MG/DL
RBC # BLD AUTO: 4.98 MILL/MM3 (ref 4.2–5.4)
RBC #/AREA URNS HPF: ABNORMAL /HPF
RENAL EPI CELLS #/AREA URNS HPF: ABNORMAL /[HPF]
SODIUM SERPL-SCNC: 139 MEQ/L (ref 135–145)
SPECIFIC GRAVITY UA: 1.01 (ref 1–1.03)
TROPONIN, HIGH SENSITIVITY: 9 NG/L (ref 0–12)
UROBILINOGEN, URINE: 1 EU/DL (ref 0–1)
WBC # BLD AUTO: 7.3 THOU/MM3 (ref 4.8–10.8)
WBC #/AREA URNS HPF: ABNORMAL /HPF
YEAST LIKE FUNGI URNS QL MICRO: ABNORMAL

## 2024-06-24 PROCEDURE — G0378 HOSPITAL OBSERVATION PER HR: HCPCS

## 2024-06-24 PROCEDURE — 82948 REAGENT STRIP/BLOOD GLUCOSE: CPT

## 2024-06-24 PROCEDURE — 3600000013 HC SURGERY LEVEL 3 ADDTL 15MIN: Performed by: UROLOGY

## 2024-06-24 PROCEDURE — 6370000000 HC RX 637 (ALT 250 FOR IP): Performed by: PHYSICIAN ASSISTANT

## 2024-06-24 PROCEDURE — 7100000000 HC PACU RECOVERY - FIRST 15 MIN: Performed by: UROLOGY

## 2024-06-24 PROCEDURE — 6360000002 HC RX W HCPCS: Performed by: STUDENT IN AN ORGANIZED HEALTH CARE EDUCATION/TRAINING PROGRAM

## 2024-06-24 PROCEDURE — 93010 ELECTROCARDIOGRAM REPORT: CPT | Performed by: NUCLEAR MEDICINE

## 2024-06-24 PROCEDURE — 6360000002 HC RX W HCPCS

## 2024-06-24 PROCEDURE — C1769 GUIDE WIRE: HCPCS | Performed by: UROLOGY

## 2024-06-24 PROCEDURE — 3700000000 HC ANESTHESIA ATTENDED CARE: Performed by: UROLOGY

## 2024-06-24 PROCEDURE — 2500000003 HC RX 250 WO HCPCS: Performed by: NURSE ANESTHETIST, CERTIFIED REGISTERED

## 2024-06-24 PROCEDURE — 99223 1ST HOSP IP/OBS HIGH 75: CPT | Performed by: STUDENT IN AN ORGANIZED HEALTH CARE EDUCATION/TRAINING PROGRAM

## 2024-06-24 PROCEDURE — 2580000003 HC RX 258: Performed by: NURSE ANESTHETIST, CERTIFIED REGISTERED

## 2024-06-24 PROCEDURE — 96374 THER/PROPH/DIAG INJ IV PUSH: CPT

## 2024-06-24 PROCEDURE — 96376 TX/PRO/DX INJ SAME DRUG ADON: CPT

## 2024-06-24 PROCEDURE — 2580000003 HC RX 258: Performed by: STUDENT IN AN ORGANIZED HEALTH CARE EDUCATION/TRAINING PROGRAM

## 2024-06-24 PROCEDURE — 6370000000 HC RX 637 (ALT 250 FOR IP): Performed by: STUDENT IN AN ORGANIZED HEALTH CARE EDUCATION/TRAINING PROGRAM

## 2024-06-24 PROCEDURE — 85025 COMPLETE CBC W/AUTO DIFF WBC: CPT

## 2024-06-24 PROCEDURE — 2709999900 HC NON-CHARGEABLE SUPPLY: Performed by: UROLOGY

## 2024-06-24 PROCEDURE — 99285 EMERGENCY DEPT VISIT HI MDM: CPT

## 2024-06-24 PROCEDURE — 81001 URINALYSIS AUTO W/SCOPE: CPT

## 2024-06-24 PROCEDURE — 7100000001 HC PACU RECOVERY - ADDTL 15 MIN: Performed by: UROLOGY

## 2024-06-24 PROCEDURE — 84484 ASSAY OF TROPONIN QUANT: CPT

## 2024-06-24 PROCEDURE — 6360000002 HC RX W HCPCS: Performed by: NURSE ANESTHETIST, CERTIFIED REGISTERED

## 2024-06-24 PROCEDURE — 74177 CT ABD & PELVIS W/CONTRAST: CPT

## 2024-06-24 PROCEDURE — 3600000003 HC SURGERY LEVEL 3 BASE: Performed by: UROLOGY

## 2024-06-24 PROCEDURE — 96375 TX/PRO/DX INJ NEW DRUG ADDON: CPT

## 2024-06-24 PROCEDURE — 36415 COLL VENOUS BLD VENIPUNCTURE: CPT

## 2024-06-24 PROCEDURE — 93005 ELECTROCARDIOGRAM TRACING: CPT | Performed by: STUDENT IN AN ORGANIZED HEALTH CARE EDUCATION/TRAINING PROGRAM

## 2024-06-24 PROCEDURE — 3700000001 HC ADD 15 MINUTES (ANESTHESIA): Performed by: UROLOGY

## 2024-06-24 PROCEDURE — C2617 STENT, NON-COR, TEM W/O DEL: HCPCS | Performed by: UROLOGY

## 2024-06-24 PROCEDURE — 80053 COMPREHEN METABOLIC PANEL: CPT

## 2024-06-24 PROCEDURE — 2720000010 HC SURG SUPPLY STERILE: Performed by: UROLOGY

## 2024-06-24 PROCEDURE — 83690 ASSAY OF LIPASE: CPT

## 2024-06-24 PROCEDURE — 6360000004 HC RX CONTRAST MEDICATION

## 2024-06-24 PROCEDURE — 6360000002 HC RX W HCPCS: Performed by: PHYSICIAN ASSISTANT

## 2024-06-24 PROCEDURE — C1758 CATHETER, URETERAL: HCPCS | Performed by: UROLOGY

## 2024-06-24 DEVICE — URETERAL STENT
Type: IMPLANTABLE DEVICE | Site: URETER | Status: FUNCTIONAL
Brand: PERCUFLEX™ PLUS

## 2024-06-24 RX ORDER — ROCURONIUM BROMIDE 10 MG/ML
INJECTION, SOLUTION INTRAVENOUS PRN
Status: DISCONTINUED | OUTPATIENT
Start: 2024-06-24 | End: 2024-06-24 | Stop reason: SDUPTHER

## 2024-06-24 RX ORDER — ENOXAPARIN SODIUM 100 MG/ML
30 INJECTION SUBCUTANEOUS 2 TIMES DAILY
Status: DISCONTINUED | OUTPATIENT
Start: 2024-06-25 | End: 2024-06-25 | Stop reason: HOSPADM

## 2024-06-24 RX ORDER — SODIUM CHLORIDE 0.9 % (FLUSH) 0.9 %
5-40 SYRINGE (ML) INJECTION PRN
OUTPATIENT
Start: 2024-06-24

## 2024-06-24 RX ORDER — TAMSULOSIN HYDROCHLORIDE 0.4 MG/1
0.4 CAPSULE ORAL ONCE
Status: COMPLETED | OUTPATIENT
Start: 2024-06-24 | End: 2024-06-24

## 2024-06-24 RX ORDER — PROCHLORPERAZINE EDISYLATE 5 MG/ML
10 INJECTION INTRAMUSCULAR; INTRAVENOUS EVERY 6 HOURS PRN
Status: DISCONTINUED | OUTPATIENT
Start: 2024-06-24 | End: 2024-06-25 | Stop reason: HOSPADM

## 2024-06-24 RX ORDER — ENOXAPARIN SODIUM 100 MG/ML
30 INJECTION SUBCUTANEOUS 2 TIMES DAILY
Status: DISCONTINUED | OUTPATIENT
Start: 2024-06-24 | End: 2024-06-24

## 2024-06-24 RX ORDER — DOXYCYCLINE HYCLATE 100 MG
100 TABLET ORAL 2 TIMES DAILY
Qty: 6 TABLET | Refills: 0 | Status: SHIPPED | OUTPATIENT
Start: 2024-06-24 | End: 2024-06-27

## 2024-06-24 RX ORDER — SODIUM CHLORIDE 9 MG/ML
INJECTION, SOLUTION INTRAVENOUS CONTINUOUS PRN
Status: DISCONTINUED | OUTPATIENT
Start: 2024-06-24 | End: 2024-06-24 | Stop reason: SDUPTHER

## 2024-06-24 RX ORDER — FENTANYL CITRATE 50 UG/ML
25 INJECTION, SOLUTION INTRAMUSCULAR; INTRAVENOUS EVERY 5 MIN PRN
OUTPATIENT
Start: 2024-06-24

## 2024-06-24 RX ORDER — SODIUM CHLORIDE 9 MG/ML
INJECTION, SOLUTION INTRAVENOUS PRN
OUTPATIENT
Start: 2024-06-24

## 2024-06-24 RX ORDER — ACETAMINOPHEN 325 MG/1
650 TABLET ORAL EVERY 6 HOURS PRN
Status: DISCONTINUED | OUTPATIENT
Start: 2024-06-24 | End: 2024-06-25 | Stop reason: HOSPADM

## 2024-06-24 RX ORDER — TAMSULOSIN HYDROCHLORIDE 0.4 MG/1
0.4 CAPSULE ORAL DAILY
Status: DISCONTINUED | OUTPATIENT
Start: 2024-06-24 | End: 2024-06-24

## 2024-06-24 RX ORDER — NALOXONE HYDROCHLORIDE 0.4 MG/ML
INJECTION, SOLUTION INTRAMUSCULAR; INTRAVENOUS; SUBCUTANEOUS PRN
OUTPATIENT
Start: 2024-06-24

## 2024-06-24 RX ORDER — DEXAMETHASONE SODIUM PHOSPHATE 10 MG/ML
INJECTION, EMULSION INTRAMUSCULAR; INTRAVENOUS PRN
Status: DISCONTINUED | OUTPATIENT
Start: 2024-06-24 | End: 2024-06-24 | Stop reason: SDUPTHER

## 2024-06-24 RX ORDER — ONDANSETRON 2 MG/ML
4 INJECTION INTRAMUSCULAR; INTRAVENOUS ONCE
Status: COMPLETED | OUTPATIENT
Start: 2024-06-24 | End: 2024-06-24

## 2024-06-24 RX ORDER — TAMSULOSIN HYDROCHLORIDE 0.4 MG/1
0.4 CAPSULE ORAL DAILY
Status: DISCONTINUED | OUTPATIENT
Start: 2024-06-25 | End: 2024-06-25 | Stop reason: HOSPADM

## 2024-06-24 RX ORDER — FENTANYL CITRATE 50 UG/ML
25 INJECTION, SOLUTION INTRAMUSCULAR; INTRAVENOUS ONCE
Status: COMPLETED | OUTPATIENT
Start: 2024-06-24 | End: 2024-06-24

## 2024-06-24 RX ORDER — SUCCINYLCHOLINE/SOD CL,ISO/PF 200MG/10ML
SYRINGE (ML) INTRAVENOUS PRN
Status: DISCONTINUED | OUTPATIENT
Start: 2024-06-24 | End: 2024-06-24 | Stop reason: SDUPTHER

## 2024-06-24 RX ORDER — SODIUM CHLORIDE 0.9 % (FLUSH) 0.9 %
5-40 SYRINGE (ML) INJECTION PRN
Status: DISCONTINUED | OUTPATIENT
Start: 2024-06-24 | End: 2024-06-25 | Stop reason: HOSPADM

## 2024-06-24 RX ORDER — OXYBUTYNIN CHLORIDE 5 MG/1
5 TABLET, EXTENDED RELEASE ORAL DAILY
Qty: 14 TABLET | Refills: 0 | Status: SHIPPED | OUTPATIENT
Start: 2024-06-24 | End: 2024-07-08

## 2024-06-24 RX ORDER — POTASSIUM CHLORIDE 7.45 MG/ML
10 INJECTION INTRAVENOUS PRN
Status: DISCONTINUED | OUTPATIENT
Start: 2024-06-24 | End: 2024-06-25 | Stop reason: HOSPADM

## 2024-06-24 RX ORDER — FENTANYL CITRATE 50 UG/ML
50 INJECTION, SOLUTION INTRAMUSCULAR; INTRAVENOUS EVERY 5 MIN PRN
OUTPATIENT
Start: 2024-06-24

## 2024-06-24 RX ORDER — PHENAZOPYRIDINE HYDROCHLORIDE 100 MG/1
100 TABLET, FILM COATED ORAL 3 TIMES DAILY PRN
Qty: 15 TABLET | Refills: 0 | Status: SHIPPED | OUTPATIENT
Start: 2024-06-24 | End: 2024-06-29

## 2024-06-24 RX ORDER — ONDANSETRON 4 MG/1
4 TABLET, ORALLY DISINTEGRATING ORAL EVERY 8 HOURS PRN
Status: DISCONTINUED | OUTPATIENT
Start: 2024-06-24 | End: 2024-06-25 | Stop reason: HOSPADM

## 2024-06-24 RX ORDER — PROPOFOL 10 MG/ML
INJECTION, EMULSION INTRAVENOUS PRN
Status: DISCONTINUED | OUTPATIENT
Start: 2024-06-24 | End: 2024-06-24 | Stop reason: SDUPTHER

## 2024-06-24 RX ORDER — LISINOPRIL 40 MG/1
40 TABLET ORAL DAILY
Status: DISCONTINUED | OUTPATIENT
Start: 2024-06-24 | End: 2024-06-25 | Stop reason: HOSPADM

## 2024-06-24 RX ORDER — ONDANSETRON 2 MG/ML
4 INJECTION INTRAMUSCULAR; INTRAVENOUS EVERY 6 HOURS PRN
Status: DISCONTINUED | OUTPATIENT
Start: 2024-06-24 | End: 2024-06-25 | Stop reason: HOSPADM

## 2024-06-24 RX ORDER — ATORVASTATIN CALCIUM 40 MG/1
40 TABLET, FILM COATED ORAL DAILY
Status: DISCONTINUED | OUTPATIENT
Start: 2024-06-24 | End: 2024-06-25 | Stop reason: HOSPADM

## 2024-06-24 RX ORDER — MORPHINE SULFATE 2 MG/ML
1 INJECTION, SOLUTION INTRAMUSCULAR; INTRAVENOUS ONCE
Status: COMPLETED | OUTPATIENT
Start: 2024-06-24 | End: 2024-06-24

## 2024-06-24 RX ORDER — ASPIRIN 81 MG/1
81 TABLET ORAL DAILY
Status: DISCONTINUED | OUTPATIENT
Start: 2024-06-24 | End: 2024-06-25 | Stop reason: HOSPADM

## 2024-06-24 RX ORDER — ACETAMINOPHEN 650 MG/1
650 SUPPOSITORY RECTAL EVERY 6 HOURS PRN
Status: DISCONTINUED | OUTPATIENT
Start: 2024-06-24 | End: 2024-06-25 | Stop reason: HOSPADM

## 2024-06-24 RX ORDER — SODIUM CHLORIDE 0.9 % (FLUSH) 0.9 %
5-40 SYRINGE (ML) INJECTION EVERY 12 HOURS SCHEDULED
Status: DISCONTINUED | OUTPATIENT
Start: 2024-06-24 | End: 2024-06-25 | Stop reason: HOSPADM

## 2024-06-24 RX ORDER — POTASSIUM CHLORIDE 20 MEQ/1
40 TABLET, EXTENDED RELEASE ORAL PRN
Status: DISCONTINUED | OUTPATIENT
Start: 2024-06-24 | End: 2024-06-25 | Stop reason: HOSPADM

## 2024-06-24 RX ORDER — SODIUM CHLORIDE 9 MG/ML
INJECTION, SOLUTION INTRAVENOUS PRN
Status: DISCONTINUED | OUTPATIENT
Start: 2024-06-24 | End: 2024-06-25 | Stop reason: HOSPADM

## 2024-06-24 RX ORDER — MAGNESIUM SULFATE IN WATER 40 MG/ML
2000 INJECTION, SOLUTION INTRAVENOUS PRN
Status: DISCONTINUED | OUTPATIENT
Start: 2024-06-24 | End: 2024-06-25 | Stop reason: HOSPADM

## 2024-06-24 RX ORDER — SODIUM CHLORIDE 9 MG/ML
INJECTION, SOLUTION INTRAVENOUS CONTINUOUS
Status: ACTIVE | OUTPATIENT
Start: 2024-06-24 | End: 2024-06-25

## 2024-06-24 RX ORDER — SODIUM CHLORIDE 0.9 % (FLUSH) 0.9 %
5-40 SYRINGE (ML) INJECTION EVERY 12 HOURS SCHEDULED
OUTPATIENT
Start: 2024-06-24

## 2024-06-24 RX ORDER — LABETALOL HYDROCHLORIDE 5 MG/ML
10 INJECTION INTRAVENOUS
OUTPATIENT
Start: 2024-06-24

## 2024-06-24 RX ORDER — BUPROPION HYDROCHLORIDE 150 MG/1
150 TABLET ORAL DAILY
Status: DISCONTINUED | OUTPATIENT
Start: 2024-06-24 | End: 2024-06-25 | Stop reason: HOSPADM

## 2024-06-24 RX ORDER — FENTANYL CITRATE 50 UG/ML
INJECTION, SOLUTION INTRAMUSCULAR; INTRAVENOUS PRN
Status: DISCONTINUED | OUTPATIENT
Start: 2024-06-24 | End: 2024-06-24 | Stop reason: SDUPTHER

## 2024-06-24 RX ORDER — ONDANSETRON 2 MG/ML
INJECTION INTRAMUSCULAR; INTRAVENOUS PRN
Status: DISCONTINUED | OUTPATIENT
Start: 2024-06-24 | End: 2024-06-24 | Stop reason: SDUPTHER

## 2024-06-24 RX ORDER — POLYETHYLENE GLYCOL 3350 17 G/17G
17 POWDER, FOR SOLUTION ORAL DAILY PRN
Status: DISCONTINUED | OUTPATIENT
Start: 2024-06-24 | End: 2024-06-25 | Stop reason: HOSPADM

## 2024-06-24 RX ORDER — MORPHINE SULFATE 4 MG/ML
4 INJECTION, SOLUTION INTRAMUSCULAR; INTRAVENOUS ONCE
Status: COMPLETED | OUTPATIENT
Start: 2024-06-24 | End: 2024-06-24

## 2024-06-24 RX ORDER — LIDOCAINE HCL/PF 100 MG/5ML
SYRINGE (ML) INJECTION PRN
Status: DISCONTINUED | OUTPATIENT
Start: 2024-06-24 | End: 2024-06-24 | Stop reason: SDUPTHER

## 2024-06-24 RX ORDER — METOPROLOL TARTRATE 50 MG/1
50 TABLET, FILM COATED ORAL 2 TIMES DAILY
Status: DISCONTINUED | OUTPATIENT
Start: 2024-06-24 | End: 2024-06-25 | Stop reason: HOSPADM

## 2024-06-24 RX ADMIN — ONDANSETRON 4 MG: 2 INJECTION INTRAMUSCULAR; INTRAVENOUS at 13:27

## 2024-06-24 RX ADMIN — SODIUM CHLORIDE: 9 INJECTION, SOLUTION INTRAVENOUS at 13:16

## 2024-06-24 RX ADMIN — ROCURONIUM BROMIDE 20 MG: 10 INJECTION INTRAVENOUS at 13:38

## 2024-06-24 RX ADMIN — MORPHINE SULFATE 1 MG: 2 INJECTION, SOLUTION INTRAMUSCULAR; INTRAVENOUS at 05:34

## 2024-06-24 RX ADMIN — PROPOFOL 200 MG: 10 INJECTION, EMULSION INTRAVENOUS at 13:23

## 2024-06-24 RX ADMIN — CEFTRIAXONE SODIUM 2000 MG: 1 INJECTION, POWDER, FOR SOLUTION INTRAMUSCULAR; INTRAVENOUS at 13:29

## 2024-06-24 RX ADMIN — MORPHINE SULFATE 4 MG: 4 INJECTION, SOLUTION INTRAMUSCULAR; INTRAVENOUS at 06:37

## 2024-06-24 RX ADMIN — ONDANSETRON 4 MG: 2 INJECTION INTRAMUSCULAR; INTRAVENOUS at 05:33

## 2024-06-24 RX ADMIN — FENTANYL CITRATE 25 MCG: 50 INJECTION, SOLUTION INTRAMUSCULAR; INTRAVENOUS at 05:05

## 2024-06-24 RX ADMIN — FENTANYL CITRATE 50 MCG: 50 INJECTION, SOLUTION INTRAMUSCULAR; INTRAVENOUS at 13:23

## 2024-06-24 RX ADMIN — SUGAMMADEX 200 MG: 100 INJECTION, SOLUTION INTRAVENOUS at 13:52

## 2024-06-24 RX ADMIN — Medication 140 MG: at 13:23

## 2024-06-24 RX ADMIN — ONDANSETRON 4 MG: 2 INJECTION INTRAMUSCULAR; INTRAVENOUS at 11:25

## 2024-06-24 RX ADMIN — Medication 100 MG: at 13:23

## 2024-06-24 RX ADMIN — TAMSULOSIN HYDROCHLORIDE 0.4 MG: 0.4 CAPSULE ORAL at 06:38

## 2024-06-24 RX ADMIN — METOPROLOL TARTRATE 50 MG: 50 TABLET, FILM COATED ORAL at 16:34

## 2024-06-24 RX ADMIN — DEXAMETHASONE SODIUM PHOSPHATE 10 MG: 10 INJECTION, EMULSION INTRAMUSCULAR; INTRAVENOUS at 13:27

## 2024-06-24 RX ADMIN — IOPAMIDOL 80 ML: 755 INJECTION, SOLUTION INTRAVENOUS at 05:41

## 2024-06-24 RX ADMIN — SODIUM CHLORIDE: 9 INJECTION, SOLUTION INTRAVENOUS at 09:53

## 2024-06-24 RX ADMIN — HYDROMORPHONE HYDROCHLORIDE 0.5 MG: 1 INJECTION, SOLUTION INTRAMUSCULAR; INTRAVENOUS; SUBCUTANEOUS at 09:52

## 2024-06-24 RX ADMIN — PROCHLORPERAZINE EDISYLATE 10 MG: 5 INJECTION INTRAMUSCULAR; INTRAVENOUS at 09:49

## 2024-06-24 RX ADMIN — METOPROLOL TARTRATE 50 MG: 50 TABLET, FILM COATED ORAL at 22:05

## 2024-06-24 ASSESSMENT — PAIN SCALES - GENERAL
PAINLEVEL_OUTOF10: 10
PAINLEVEL_OUTOF10: 0
PAINLEVEL_OUTOF10: 10

## 2024-06-24 ASSESSMENT — PAIN DESCRIPTION - ORIENTATION: ORIENTATION: RIGHT

## 2024-06-24 ASSESSMENT — ENCOUNTER SYMPTOMS: SHORTNESS OF BREATH: 1

## 2024-06-24 ASSESSMENT — PAIN - FUNCTIONAL ASSESSMENT: PAIN_FUNCTIONAL_ASSESSMENT: 0-10

## 2024-06-24 ASSESSMENT — PAIN DESCRIPTION - LOCATION: LOCATION: FLANK

## 2024-06-24 NOTE — ED PROVIDER NOTES
Addendum: Care was assumed at 6 AM.  I did we medicate the patient with opiates for the fourth time.  The patient did not get much relief with that.  I did discuss case with urology and will put the patient for pain control.  The patient was also given Flomax here.  At this point the patient be admitted for pain control.  I did discuss the case with hospital service is going to admit the patient.  Please see Estefani Muir PA-C note for full ED note.      Final Diagnosis:   Renal Colic       Eric Luis PA  06/24/24 [id number]

## 2024-06-24 NOTE — H&P
[x]No    Disposition:      [x] TBD                             [x] Home                             [] TCU                             [] Rehab                             [] Psych                             [] SNF                             [] Long Term Care Facility                             [] Other-      Electronically signed by Karlene Hammonds MD 6/24/2024

## 2024-06-24 NOTE — ED NOTES
ED to inpatient nurses report      Chief Complaint:  Chief Complaint   Patient presents with    Flank Pain     Present to ED from: home    MOA:     LOC: alert and orientated to name, place, date  Mobility: Independent  Oxygen Baseline: room air    Current needs required: 3L NC     Code Status:   Prior    Mental Status:  Level of Consciousness: Alert (0)    Psych Assessment:        Vitals:  Patient Vitals for the past 24 hrs:   BP Temp Temp src Pulse Resp SpO2 Weight   06/24/24 0640 (!) 170/91 -- -- 76 17 92 % --   06/24/24 0507 (!) 179/97 98.3 °F (36.8 °C) Oral 71 18 95 % --   06/24/24 0428 (!) 118/91 -- -- 73 18 95 % --   06/24/24 0425 -- -- -- -- -- -- 127 kg (280 lb)        LDAs:   Peripheral IV 06/24/24 Left Antecubital (Active)   Site Assessment Clean, dry & intact 06/24/24 0430       Ambulatory Status:  No data recorded    Diagnosis:  DISPOSITION Decision To Admit 06/24/2024 06:58:36 AM   Final diagnoses:   Renal colic        Consults:  [facility] ED TO IP CONSULT     Pain Score:  Pain Assessment  Pain Assessment: 0-10  Pain Level: 10  Pain Location: Flank  Pain Orientation: Right    C-SSRS:        Sepsis Screening:       Rj Fall Risk:       Swallow Screening        Preferred Language:   English      ALLERGIES     Other, Sulfa antibiotics, Trulicity [dulaglutide], Dilaudid [hydromorphone hcl], Toradol [ketorolac tromethamine], and Tramadol    SURGICAL HISTORY       Past Surgical History:   Procedure Laterality Date    CARDIAC CATHETERIZATION      COLONOSCOPY  01/2012    CYSTOSCOPY  2011    kidney stone retrieval    DIAGNOSTIC CARDIAC CATH LAB PROCEDURE  2017    DILATION AND CURETTAGE OF UTERUS  1996    FOOT SURGERY Left 2011    realigned toes, and fused ankle    INCISIONAL BREAST BIOPSY Left 1967    Benign lump removed at age 17    LITHOTRIPSY  03/09/2011    THROAT SURGERY  01/2014    Stretched throat       PAST MEDICAL HISTORY       Past Medical History:   Diagnosis Date    Anxiety     Arthritis     CAD

## 2024-06-24 NOTE — ANESTHESIA PRE PROCEDURE
Department of Anesthesiology  Preprocedure Note       Name:  Dara Sanz   Age:  73 y.o.  :  1950                                          MRN:  [id number]         Date:  2024      Surgeon: Surgeon(s):  Alvarez Sousa Jr., MD    Procedure: Procedure(s):  CYSTO, RIGHT URETEROSCOPY LASER LITHOTRIPSY, BASKET RETRIEVAL OF STONES, STENT    Medications prior to admission:   Prior to Admission medications    Medication Sig Start Date End Date Taking? Authorizing Provider   atorvastatin (LIPITOR) 40 MG tablet Take 1 tablet by mouth daily 3/12/24   Yrn Zacarias MD   metoprolol tartrate (LOPRESSOR) 50 MG tablet Take 1 tablet by mouth 2 times daily 3/12/24   Yrn Zacarias MD   ramipril (ALTACE) 10 MG capsule Take 1 capsule by mouth daily 3/12/24   Yrn Zacarias MD   Omega 3 1000 MG CAPS Take by mouth 2 tablets daily    Solange Vallejo MD   MAGNESIUM CITRATE PO Take by mouth 400 mg daily    Solange Vallejo MD   Ascorbic Acid (VITAMIN C) 1000 MG tablet Take 1 tablet by mouth daily    Solange Vallejo MD   Zinc Sulfate (ZINC-220 PO) Take by mouth daily One tablet - unsure of dosage    Solange Vallejo MD   Multiple Vitamins-Minerals (MULTIVITAMIN ADULT EXTRA C PO)     Solange Vallejo MD   Cholecalciferol (VITAMIN D3) 50 MCG ( UT) TABS Take by mouth daily    Solange Vallejo MD   Black Elderberry (SAMBUCUS ELDERBERRY PO) Take 1,250 mg by mouth    Solange Vallejo MD   NONFORMULARY Take 1 capsule by mouth daily [employer]  - Carotenoid 10mg 1xday    Solange Vallejo MD   aspirin 81 MG EC tablet Take 1 tablet by mouth daily    Solange Vallejo MD       Current medications:    Current Facility-Administered Medications   Medication Dose Route Frequency Provider Last Rate Last Admin   • sodium chloride flush 0.9 % injection 5-40 mL  5-40 mL IntraVENous 2 times per day Karlene Hammonds MD       • sodium chloride flush 0.9 % injection 5-40 mL  5-40 mL

## 2024-06-24 NOTE — DISCHARGE INSTRUCTIONS
Discharge instructions: Ureteroscopy lithotripsy and stent placement (with string):  You may see blood in the urine after the procedure.  This should resolve over the next couple days.  Please stay hydrated.  You may see intermittent blood in the urine while the stent is in place.  This is expected.  You may experience flank pain, and/or frequency/urgency of urination while the stent is in place.  Please use medications prescribed to help with these symptoms.    Pt ok to discharge home in good condition  No heavy lifting, >10 lbs for today  Pt should avoid strenuous activity for today  Pt should walk moderately at home  Pt ok to shower   Pt may resume diet as tolerated  Pt should take Rx as directed  No driving while on narcotics  Please call attending physician or hospital  with questions  Call or Present to ED if fever (> 101F), intractable nausea vomiting or pain.  Rx in chart    Pt should follow up with Dr. Alvarez Sousa Jr, MD, in 3 months with [facility] and obi, call to confirm appointment.    Pt should Pull stent in the morning of 6/29.  There may be some pain associated with the stent removal, which is usually self limiting.  We suggest using the pain medication prescribed for you and a nonsteroidal anti-inflammatory such as Ibuprofen, if you are able to take this medication, to control symptoms.  Take Ibuprofen as directed for 24 hrs after stent pull.  Please stay hydrated.  Please call with questions.

## 2024-06-24 NOTE — ED TRIAGE NOTES
Presents to ED with c/o right sided flank pain that started at 0230 today. Patient reports nausea. EMS gave 4mg oral zofran and 50mg IV fentanyl. Patient alert and oriented. Respirations easy and unlabored.

## 2024-06-24 NOTE — OP NOTE
FACILITY:  [place], [place]   Dara Sanz  1950  [id number]    DATE: 06/24/24  SURGEON:  Dr. Alvarez Sousa Jr, MD , MD  ASSISTANT: Dr. Alvarez Sousa Jr, MD MD  PREOPERATIVE DIAGNOSIS: right sided kidney stone  POSTOPERATIVE DIAGNOSIS: right sided kidney stone  PROCEDURES PERFORMED:  1. Cystoscopy.  2. right sided ureteroscopy with basket extraction of stones  3. right sided stent placement.  DRAINS: A rigth sided 6x26 double J ureteral stent (with string)  SPECIMEN: none  ANESTHESIA: General  ESTIMATED BLOOD LOSS: None.   COMPLICATIONS: None.     INDICATIONS FOR PROCEDURE:  Dara Sanz is a 73 y.o. female presents for right sided calculus.     After the risks, benefits, alternatives, of the procedure were discussed with the patient, informed consent was obtained.  The patient elected to proceed.     DETAILS OF THE PROCEDURE:  The patient was brought back from the preoperative holding area to the  operating suite, and was transferred to the operating table where the patient lay in supine position. EPC's were in place, connected to the machine and the machine was turned on before induction.  General endotracheal anesthesia was induced, and patient was prepped and draped in standard surgical fashion after being placed in dorsolithotomy position. A proper timeout was performed, preoperative antibiotics were given. We began by inserting a cystoscope with a 22 [place] sheath and 30 degree lens into the patient's urethral meatus and advancing into the bladder without complication.  A pan cystoscopy was preformed and the bladder appeared unremarkable.  We then focused our attention on the ureteral orifice, which we cannulated with our glidewire, advanced up to renal pelvis.  We then used a dual lumen, to perform a retrograde pyelogram to identify the level of obstruction and renal pelvis.  We then used the catheter to place a second wire.  Once in good position, we advanced our rigid ureteroscope

## 2024-06-24 NOTE — FLOWSHEET NOTE
06/24/24 1613   Treatment Team Notification   Reason for Communication Patient/Family request   Name of Team Member Notified Dr. Hammonds   Treatment Team Role Attending Provider   Method of Communication Secure Message   Response No new orders   Notification Time 1610     Urology (Jose David Brito and Dr. Sousa) stated patient is okay to be discharged tonight as long as her pain is manageable and she tolerates her diet. Patient asking if she will be discharged tonight. RN messaged attending. Dr. Perez would like patient monitored overnight.

## 2024-06-24 NOTE — ED PROVIDER NOTES
[facility]      EMERGENCY MEDICINE     Pt Name: Dara Sanz  MRN: [id number]  Birthdate 1950  Date of evaluation: 6/24/2024  Provider: Estefani Muir PA-C    CHIEF COMPLAINT       Chief Complaint   Patient presents with    Flank Pain     HISTORY OF PRESENT ILLNESS   Dara Sanz is a pleasant 73 y.o. female who presents to the emergency department from from home, by private vehicle for evaluation of right-sided flank pain that started this morning around 2 AM.  Patient is having severe stabbing pain that woke her up out of sleep.  Patient states she had a kidney stone 15 years ago that felt like this.  Patient endorses nausea and vomiting. Patient denies diarrhea, constipation, dysuria, hematuria, fever, injury/trauma to back, shortness of breath, and chest pain.    PASTMEDICAL HISTORY     Past Medical History:   Diagnosis Date    Anxiety     Arthritis     CAD (coronary artery disease)     GERD (gastroesophageal reflux disease)     History of kidney stones     Hyperlipidemia     Hypertension     Kidney stone 2011    Osteoarthritis     Sleep apnea        Patient Active Problem List   Diagnosis Code    CAD in native artery I25.10    Hypoxia R09.02    Morbid obesity with BMI of 45.0-49.9, adult (HCC) E66.01, Z68.42    JENNY (obstructive sleep apnea) G47.33    Obesity hypoventilation syndrome ([facility]) E66.2    Pulmonary hypertension ([facility]) I27.20    Pulmonary interstitial fibrosis ([facility]) J84.10    Type 2 diabetes mellitus without complication, without long-term current use of insulin (HCC) E11.9    Primary hypertension I10    Obstructive sleep apnea syndrome G47.33    Right flank pain R10.9    Renal colic N23     SURGICAL HISTORY       Past Surgical History:   Procedure Laterality Date    CARDIAC CATHETERIZATION      COLONOSCOPY  01/2012    CYSTOSCOPY  2011    kidney stone retrieval    DIAGNOSTIC CARDIAC CATH LAB PROCEDURE  2017    DILATION AND CURETTAGE OF UTERUS  1996    FOOT SURGERY Left 2011

## 2024-06-24 NOTE — ANESTHESIA POSTPROCEDURE EVALUATION
Department of Anesthesiology  Postprocedure Note    Patient: Dara Sanz  MRN: [id number]  YOB: 1950  Date of evaluation: 6/24/2024    Procedure Summary       Date: 06/24/24 [facility]    Anesthesia Start: 1316 Anesthesia Stop: 1406    Procedure: CYSTO, RIGHT URETEROSCOPY, BASKET RETRIEVAL OF STONES, STENT (Right) Diagnosis:       Right ureteral stone      (Right ureteral stone [N20.1])    Surgeons: Alvarez Sousa Jr., MD Responsible Provider: Pito Bonilla MD    Anesthesia Type: general ASA Status: 4            Anesthesia Type: No value filed.    Cherie Phase I: Cherie Score: 9    Cherie Phase II:      Anesthesia Post Evaluation    Patient location during evaluation: PACU  Patient participation: complete - patient participated  Level of consciousness: awake  Airway patency: patent  Nausea & Vomiting: no vomiting and no nausea  Cardiovascular status: hemodynamically stable  Respiratory status: acceptable and nasal cannula  Hydration status: stable  Pain management: adequate    No notable events documented.

## 2024-06-24 NOTE — CONSULTS
BUN 19 06/24/2024 04:30 AM    CREATININE 0.9 06/24/2024 04:30 AM     Magnesium:    Lab Results   Component Value Date/Time    MG 2.1 06/19/2017 03:12 PM     Phosphorus:  No results found for: \"PHOS\"  PT/INR:    Lab Results   Component Value Date/Time    INR 1.08 12/08/2017 02:26 PM     U/A:    Lab Results   Component Value Date/Time    NITRITE Negative 01/09/2014 08:47 AM    COLORU YELLOW 06/24/2024 04:50 AM    PHUR 7.5 06/24/2024 04:50 AM    PHUR 7.0 10/05/2022 12:01 PM    LABCAST NONE SEEN 06/24/2024 04:50 AM    LABCAST NONE SEEN 06/24/2024 04:50 AM    WBCUA 2-4 06/24/2024 04:50 AM    WBCUA 2-4 01/25/2012 02:45 PM    RBCUA 25-50 06/24/2024 04:50 AM    MUCUS THREADS 12/07/2015 10:03 AM    YEAST NONE SEEN 06/24/2024 04:50 AM    BACTERIA NONE SEEN 06/24/2024 04:50 AM    LEUKOCYTESUR NEGATIVE 06/24/2024 04:50 AM    UROBILINOGEN 1.0 06/24/2024 04:50 AM    BILIRUBINUR NEGATIVE 06/24/2024 04:50 AM    BLOODU SMALL 06/24/2024 04:50 AM    GLUCOSEU NEGATIVE 06/24/2024 04:50 AM       Imaging:   The patient has had a CT Without and With Contrast which I have independently reviewed along with its accompanying report.  The study demonstrates EXAM: CT Abdomen and Pelvis With Intravenous Contrast     COMPARISON: CT abdomen pelvis 08/12/2013     FINDINGS:  LUNG BASES: Atelectasis.  MEDIASTINUM: Small hiatal hernia.     ABDOMEN:  LIVER: Hepatic steatosis noted.  GALLBLADDER AND BILE DUCTS: Mildly distended gallbladder. No calcified   stones. No ductal dilation.  PANCREAS: Atrophic pancreas. No ductal dilation.  SPLEEN: Unremarkable. No splenomegaly.  ADRENALS: Unremarkable. No mass.  KIDNEYS AND URETERS: 2 mm obstructive right UVJ calculus with mild   upstream hydroureteronephrosis. Right renal parenchymal edema with   prominent perinephric stranding mildly delayed nephrograms. Superimposed   infection not excluded.  STOMACH AND BOWEL: Scattered colonic diverticulosis without diverticulitis   or colitis. No obstruction.

## 2024-06-25 ENCOUNTER — TELEPHONE (OUTPATIENT)
Dept: UROLOGY | Age: 74
End: 2024-06-25

## 2024-06-25 VITALS
BODY MASS INDEX: 45.55 KG/M2 | DIASTOLIC BLOOD PRESSURE: 71 MMHG | TEMPERATURE: 97.5 F | HEIGHT: 65 IN | WEIGHT: 273.37 LBS | OXYGEN SATURATION: 94 % | RESPIRATION RATE: 20 BRPM | SYSTOLIC BLOOD PRESSURE: 153 MMHG | HEART RATE: 77 BPM

## 2024-06-25 PROBLEM — N23 RENAL COLIC: Status: ACTIVE | Noted: 2024-06-25

## 2024-06-25 PROCEDURE — 96375 TX/PRO/DX INJ NEW DRUG ADDON: CPT

## 2024-06-25 PROCEDURE — 96365 THER/PROPH/DIAG IV INF INIT: CPT

## 2024-06-25 PROCEDURE — G0378 HOSPITAL OBSERVATION PER HR: HCPCS

## 2024-06-25 PROCEDURE — 99231 SBSQ HOSP IP/OBS SF/LOW 25: CPT | Performed by: UROLOGY

## 2024-06-25 PROCEDURE — 6370000000 HC RX 637 (ALT 250 FOR IP): Performed by: STUDENT IN AN ORGANIZED HEALTH CARE EDUCATION/TRAINING PROGRAM

## 2024-06-25 PROCEDURE — 99238 HOSP IP/OBS DSCHRG MGMT 30/<: CPT | Performed by: INTERNAL MEDICINE

## 2024-06-25 PROCEDURE — 2580000003 HC RX 258: Performed by: STUDENT IN AN ORGANIZED HEALTH CARE EDUCATION/TRAINING PROGRAM

## 2024-06-25 PROCEDURE — 96366 THER/PROPH/DIAG IV INF ADDON: CPT

## 2024-06-25 PROCEDURE — 2500000003 HC RX 250 WO HCPCS

## 2024-06-25 RX ORDER — TAMSULOSIN HYDROCHLORIDE 0.4 MG/1
0.4 CAPSULE ORAL DAILY
Qty: 30 CAPSULE | Refills: 3 | Status: SHIPPED | OUTPATIENT
Start: 2024-06-25

## 2024-06-25 RX ORDER — METOPROLOL TARTRATE 1 MG/ML
5 INJECTION, SOLUTION INTRAVENOUS EVERY 6 HOURS PRN
Status: DISCONTINUED | OUTPATIENT
Start: 2024-06-25 | End: 2024-06-25 | Stop reason: HOSPADM

## 2024-06-25 RX ADMIN — METOPROLOL TARTRATE 5 MG: 5 INJECTION INTRAVENOUS at 04:25

## 2024-06-25 RX ADMIN — SODIUM CHLORIDE: 9 INJECTION, SOLUTION INTRAVENOUS at 04:29

## 2024-06-25 RX ADMIN — METOPROLOL TARTRATE 50 MG: 50 TABLET, FILM COATED ORAL at 10:11

## 2024-06-25 RX ADMIN — SODIUM CHLORIDE: 9 INJECTION, SOLUTION INTRAVENOUS at 01:48

## 2024-06-25 RX ADMIN — TAMSULOSIN HYDROCHLORIDE 0.4 MG: 0.4 CAPSULE ORAL at 10:07

## 2024-06-25 RX ADMIN — ATORVASTATIN CALCIUM 40 MG: 40 TABLET, FILM COATED ORAL at 10:06

## 2024-06-25 ASSESSMENT — PAIN SCALES - GENERAL
PAINLEVEL_OUTOF10: 0
PAINLEVEL_OUTOF10: 2

## 2024-06-25 ASSESSMENT — PAIN DESCRIPTION - ORIENTATION: ORIENTATION: UPPER

## 2024-06-25 ASSESSMENT — PAIN DESCRIPTION - LOCATION: LOCATION: RIB CAGE

## 2024-06-25 ASSESSMENT — PAIN DESCRIPTION - FREQUENCY: FREQUENCY: INTERMITTENT

## 2024-06-25 NOTE — FLOWSHEET NOTE
06/25/24 0346   Treatment Team Notification   Reason for Communication Abnormal vitals  (Ma'am this patient's BP is 179/89mmHg,automatic, 190/80 manually, HR 77bpm. No PRN order for high BP. No complaint of pain , not in any form of distress. Post cystoscopy patien)   Name of Team Member Notified Tariq Rosales   Treatment Team Role Advanced Practice Nurse   Method of Communication Secure Message   Response See orders   Notification Time 0346

## 2024-06-25 NOTE — PLAN OF CARE
Progressing   Patient using call light appropriately to call for assistance with ambulation to bathroom.  Personal items within reach. Patient is also compliant with use of non-skid slippers.      Problem: Safety - Adult  Goal: Free from fall injury  Outcome: Progressing   Fall assessment completed. Patient using call light appropriately to call for assistance with ambulation to bathroom.  Personal items within reach. Patient is also compliant with use of non-skid slippers.      Problem: Neurosensory - Adult  Goal: Achieves maximal functionality and self care  Outcome: Progressing  Flowsheets (Taken 6/24/2024 2130)  Achieves maximal functionality and self care:   Monitor swallowing and airway patency with patient fatigue and changes in neurological status   Encourage and assist patient to increase activity and self care with guidance from physical therapy/occupational therapy   Encourage visually impaired, hearing impaired and aphasic patients to use assistive/communication devices     Problem: Respiratory - Adult  Goal: Achieves optimal ventilation and oxygenation  Outcome: Progressing  Flowsheets (Taken 6/24/2024 2130)  Achieves optimal ventilation and oxygenation:   Assess for changes in respiratory status   Assess for changes in mentation and behavior   Position to facilitate oxygenation and minimize respiratory effort   Oxygen supplementation based on oxygen saturation or arterial blood gases   Encourage broncho-pulmonary hygiene including cough, deep breathe, incentive spirometry   Assess the need for suctioning and aspirate as needed   Assess and instruct to report shortness of breath or any respiratory difficulty   Respiratory therapy support as indicated     Problem: Skin/Tissue Integrity - Adult  Goal: Skin integrity remains intact  Outcome: Progressing  Flowsheets (Taken 6/24/2024 2130)  Skin Integrity Remains Intact:   Monitor for areas of redness and/or skin breakdown   Assess vascular access sites

## 2024-06-25 NOTE — TELEPHONE ENCOUNTER
Stone treated by celia  Remove string stent 3 days, can be done in office if patient prefers  Needs OV in 2-3 wks for sx check and to establish care

## 2024-06-25 NOTE — DISCHARGE SUMMARY
Discharge Summary    Date: 6/25/2024  Patient Name: Dara Sanz    YOB: 1950     Age: 73 y.o.    Admit Date: 6/24/2024  Discharge Date: 6/25/2024  Discharge Condition: Good    Admission Diagnosis  Renal colic [N23];Right flank pain [R10.9]      Discharge Diagnosis  Principal Problem:    Right flank pain  Resolved Problems:    * No resolved hospital problems. *      Hospital Stay  Narrative of Hospital Course:  73-year-old female with remote history of nephrolithiasis presented to [facility] ED with acute onset right-sided lower back/flank pain with associated nausea and vomiting.      ED: Afebrile and hemodynamically stable, mildly hypertensive, saturating well on room air.  Labs generally unremarkable.  UA notable for small blood, RBC 25-50 no signs of infection. CT abdomen pelvis with IV contrast showed 2 mm obstructive right UVJ calculus with mild upstream hydroureteronephrosis. Urology consulted. Admitted to hospitalist team.      On evaluation, patient reports waking up around 2:30 AM with intense low back pain.  Has had intermittent nausea and vomiting since then.  No subjective fevers, chills, abdominal pain, dysuria.  No acute chest pain, dizziness lightheadedness, shortness of breath.  Has shortness of breath at baseline related to his heart disease per patient.  No other issues at this time. Still having nausea/ emesis.   2 mm obstructive right UVJ calculus with mild hydroureteronephrosis: symptomatic.   -Patient is s/p s/p CYSTO, RIGHT URETEROSCOPY, BASKET RETRIEVAL OF STONES, STENT on 6/24/2024  -As per Urology Can remove string stent in 3 days  -Urology Office to coordinate follow up  -Urology cleared patient for discharge.  -As per Urology Cont flomaxf for 2 wks, doxy, ditropan, pyridium .        Primary hypertension: On ramipril 10 mg daily and Lopressor 50 mg twice daily.       Hx CAD/ HLD: On aspirin, lipitor and Lopressor. On Tele.  JENNY: unclear if on CPAP.       Consultants:  [facility] ED

## 2024-06-25 NOTE — PROGRESS NOTES
1404- pt to pacu. VSS, pt denies pain, nausea. Pt appears in no acute distress.    1411- pt continues to deny complaint. VSS    1420- pt resting in bed eyes closed, VSS.    1428- report called to 5 nurse jack    1434- pt meets criteria for discharge from pacu, awaiting transport.     1446- Transport arrives to take pt to 5K 04 in stable condition.    
Patient ambulated short distances to bed side commode. Spontaneously voided. Zavala clear urine due to blood. Patient tolerated ambulation poorly due to dizziness at the moment.   
Patient did not bring any personal clothes, jewelery, glasses, dentures/partials, and hearing aids.  
Patient discharged in stable condition as per order of attending physician to Home per staff at Time: 1225 to car.    AVS provided by RN at time of discharge, which includes all necessary medical information pertaining to the patients current course of illness, treatment, medications, post-discharge goals of care, and treatment preferences.     Patient/ family verbalize understanding of discharge plan and are in agreement with goal/plan/treatment preferences.     Belongings including Glasses sent with patient.      Home medications sent home with patient N/A. Meds to beds flomax sent to patients room    Availability of \"My Chart\" offered to patient as a tool for updated health record.  Steps for activation discussed with patient as mentioned on AVS.     
Patient educated on how to use incentive spirometer. Patient verbalized understanding and demonstrated proper use. Emphasized importance and usage of device, with coughing and deep breathing every 2 hours while awake.    Goal is 1500 and patient is achieving.     Patient educated on importance of early ambulation. Patient verbalizes understanding.         
Pt admitted to  5 via via cart/stretcher from  pacu .  Complaints: kidney stones.    IV normal saline infusing into the antecubital left, condition patent and no redness at a rate of 100 mls/ hour with about 100 mls in the bag still. IV site free of s/s of infection or infiltration. Vital signs obtained. Assessment and data collection initiated. Two nurse skin assessment performed by TONG Lacey and TONG Savaeg. Oriented to room. Policies and procedures for  explained. All questions answered with no further questions at this time. Fall prevention and safety brochure discussed with patient.  Bed alarm on. Call light in reach.Oriented to room.   Deepthi Cannon RN, RN 6/24/2024 3:00 PM     Explained patients right to have family, representative or physician notified of their admission.  Patient has Requestedfor physician to be notified. PCP and Cardiologist Patient has Declined for family/representative to be notified. Grand-daughter at bedside.   
Scattered colonic diverticulosis without diverticulitis or colitis. No obstruction. PELVIS: APPENDIX: No findings to suggest acute appendicitis. BLADDER: Unremarkable. No mass. REPRODUCTIVE: Unremarkable as visualized. ABDOMEN and PELVIS: INTRAPERITONEAL SPACE: Unremarkable. No free air. No significant fluid collection. BONES/JOINTS: Osteopenia with diffuse multilevel spondylosis. No acute fracture. No dislocation. SOFT TISSUES: Fat containing umbilical hernia. VASCULATURE: Unremarkable. No abdominal aortic aneurysm. LYMPH NODES: Unremarkable. No enlarged lymph nodes.     1. 2 mm obstructive right UVJ calculus with mild upstream hydroureteronephrosis. 2. Additional findings as described. This document has been electronically signed by: Alex Springer MD on 06/24/2024 06:06 AM All CTs at this facility use dose modulation techniques and iterative reconstructions, and/or weight-based dosing when appropriate to reduce radiation to a low as reasonably achievable.      Impression:    Patient Active Problem List   Diagnosis    CAD in native artery    Hypoxia    Morbid obesity with BMI of 45.0-49.9, adult (HCC)    JENNY (obstructive sleep apnea)    Obesity hypoventilation syndrome (HCC)    Pulmonary hypertension (HCC)    Pulmonary interstitial fibrosis (HCC)    Type 2 diabetes mellitus without complication, without long-term current use of insulin (HCC)    Primary hypertension    Obstructive sleep apnea syndrome    Right flank pain       s/p CYSTO, RIGHT URETEROSCOPY, BASKET RETRIEVAL OF STONES, STENT on 6/24/2024    MARIA INES Diallo - CNP  9:25 AM 6/25/2024

## 2024-06-26 ENCOUNTER — TELEPHONE (OUTPATIENT)
Dept: FAMILY MEDICINE CLINIC | Age: 74
End: 2024-06-26

## 2024-06-26 NOTE — TELEPHONE ENCOUNTER
Care Transitions Initial Follow Up Call    Outreach made within 2 business days of discharge: Yes    Patient: Dara Sanz   Patient : 1950   MRN: [id number]    Reason for Admission: Kidney stone with right sided ureteroscopy with basket extraction of stones by Dr. Sousa, also had stent placed.  Discharge Date: 24       Spoke with: Dara    Discharge department/facility: [facility] Interactive Patient Contact:  Was patient able to fill all prescriptions: Yes  Was patient instructed to bring all medications to the follow-up visit: Yes  Is patient taking all medications as directed in the discharge summary? Yes  Does patient understand their discharge instructions: Yes  Does patient have questions or concerns that need addressed prior to 7-14 day follow up office visit: No    Spoke to pt and she is doing well and is taking her medications as prescribed. She will f/up with urology as scheduled on 24. Pt declined appt with [facility] at this time.     Follow Up  Future Appointments   Date Time Provider Department Center   2024  8:45 AM Ricardo Cates PA-C [place] [facility]   2024  1:00 PM Johanna Oh APRN - CNP Fam Medicine [facility] [place]   2025  1:00 PM Yrn Zacarias MD N [facility]       PEDRO BRADFORD CMA ([facility])

## 2024-06-26 NOTE — TELEPHONE ENCOUNTER
Care Transitions Initial Follow Up Call    Outreach made within 2 business days of discharge: Yes    Patient: Dara Sanz Patient : 1950   MRN: [id number]  Reason for Admission: There are no discharge diagnoses documented for the most recent discharge.  Discharge Date: 24       Spoke with: WILLIAM for call back    Discharge department/facility: [facility]      Scheduled appointment with PCP within 7-14 days    Follow Up  Future Appointments   Date Time Provider Department Center   2024  8:45 AM Ricardo Cates PA-C [place] [facility] [place]   2024  1:00 PM Johanna Oh APRN - CNP Fam Medicine [facility] [place]   2025  1:00 PM Yrn Zacarias MD N [facility] [place]       Elyssa Avila LPN

## 2024-07-22 ENCOUNTER — TELEPHONE (OUTPATIENT)
Dept: UROLOGY | Age: 74
End: 2024-07-22

## 2024-07-22 NOTE — TELEPHONE ENCOUNTER
Patient underwent Cystoscopy.  2. right sided ureteroscopy with basket extraction of stones  3. right sided stent placement on 06/24/2024.    Can she stop the flomax? If not, she will need a refill.

## 2024-08-08 ENCOUNTER — OFFICE VISIT (OUTPATIENT)
Dept: UROLOGY | Age: 74
End: 2024-08-08
Payer: MEDICARE

## 2024-08-08 VITALS
RESPIRATION RATE: 18 BRPM | HEIGHT: 65 IN | DIASTOLIC BLOOD PRESSURE: 94 MMHG | BODY MASS INDEX: 48.27 KG/M2 | SYSTOLIC BLOOD PRESSURE: 138 MMHG | WEIGHT: 289.7 LBS

## 2024-08-08 DIAGNOSIS — N20.0 KIDNEY STONES: Primary | ICD-10-CM

## 2024-08-08 PROCEDURE — G8417 CALC BMI ABV UP PARAM F/U: HCPCS

## 2024-08-08 PROCEDURE — 3075F SYST BP GE 130 - 139MM HG: CPT

## 2024-08-08 PROCEDURE — 3017F COLORECTAL CA SCREEN DOC REV: CPT

## 2024-08-08 PROCEDURE — 99213 OFFICE O/P EST LOW 20 MIN: CPT

## 2024-08-08 PROCEDURE — 1036F TOBACCO NON-USER: CPT

## 2024-08-08 PROCEDURE — 3080F DIAST BP >= 90 MM HG: CPT

## 2024-08-08 PROCEDURE — 1123F ACP DISCUSS/DSCN MKR DOCD: CPT

## 2024-08-08 PROCEDURE — G8399 PT W/DXA RESULTS DOCUMENT: HCPCS

## 2024-08-08 PROCEDURE — G8427 DOCREV CUR MEDS BY ELIG CLIN: HCPCS

## 2024-08-08 PROCEDURE — 1090F PRES/ABSN URINE INCON ASSESS: CPT

## 2024-08-08 NOTE — PATIENT INSTRUCTIONS
Hydration with goal of >80 oz of water daily.     Low sodium (<2300 mg/day) and low oxalate diet.    Lemon to the diet (to adjust pH and prevent stones).     Recommend 1/4 cup (2 oz) lemon juice to the morning water.

## 2024-08-08 NOTE — PROGRESS NOTES
[facility] [place] [facility]  [facility] UROLOGY  [address]  [address]  [facility] [address]  Dept: [phone]  Loc: [phone]  Visit Date: 8/8/2024      HPI:   Dara Sanz is a 73 y.o. female with past medical history as listed below who presents today in follow-up for kidney stone follow-up.    S/P Cystoscopy, Right URS, Basket Retrieval of Stone Fragments, Right Ureteral Stent Placement by Dr. Sousa on 06/24/24. Doing well following surgery. Stent out.    First stone that has required OV in > 10 years. Last documented episode in January of 2014. No additional stones noted on CT.    Adequate hydration.      Current Outpatient Medications   Medication Sig Dispense Refill    atorvastatin (LIPITOR) 40 MG tablet Take 1 tablet by mouth daily 90 tablet 3    metoprolol tartrate (LOPRESSOR) 50 MG tablet Take 1 tablet by mouth 2 times daily 180 tablet 3    ramipril (ALTACE) 10 MG capsule Take 1 capsule by mouth daily 90 capsule 3    Omega 3 1000 MG CAPS Take by mouth 2 tablets daily      MAGNESIUM CITRATE PO Take by mouth 400 mg daily      Ascorbic Acid (VITAMIN C) 1000 MG tablet Take 1 tablet by mouth daily      Zinc Sulfate (ZINC-220 PO) Take by mouth daily One tablet - unsure of dosage      Multiple Vitamins-Minerals (MULTIVITAMIN ADULT EXTRA C PO)       Cholecalciferol (VITAMIN D3) 50 MCG (2000 UT) TABS Take by mouth daily      Black Elderberry (SAMBUCUS ELDERBERRY PO) Take 1,250 mg by mouth      NONFORMULARY Take 1 capsule by mouth daily [employer]  - Carotenoid 10mg 1xday      aspirin 81 MG EC tablet Take 1 tablet by mouth daily      tamsulosin (FLOMAX) 0.4 MG capsule Take 1 capsule by mouth daily (Patient not taking: Reported on 8/8/2024) 30 capsule 3    oxyBUTYnin (DITROPAN XL) 5 MG extended release tablet Take 1 tablet by mouth daily for 14 days 14 tablet 0     No current facility-administered medications for this visit.       Past Medical History  Dara  has a past medical

## 2024-08-13 ENCOUNTER — LAB (OUTPATIENT)
Dept: LAB | Age: 74
End: 2024-08-13

## 2024-08-13 DIAGNOSIS — E11.9 TYPE 2 DIABETES MELLITUS WITHOUT COMPLICATION, WITHOUT LONG-TERM CURRENT USE OF INSULIN (HCC): ICD-10-CM

## 2024-08-13 DIAGNOSIS — E78.2 MIXED HYPERLIPIDEMIA: ICD-10-CM

## 2024-08-13 DIAGNOSIS — I10 PRIMARY HYPERTENSION: ICD-10-CM

## 2024-08-13 DIAGNOSIS — I27.20 PULMONARY HYPERTENSION (HCC): ICD-10-CM

## 2024-08-13 LAB
ALBUMIN SERPL BCG-MCNC: 4 G/DL (ref 3.5–5.1)
ALP SERPL-CCNC: 81 U/L (ref 38–126)
ALT SERPL W/O P-5'-P-CCNC: 28 U/L (ref 11–66)
ANION GAP SERPL CALC-SCNC: 11 MEQ/L (ref 8–16)
AST SERPL-CCNC: 29 U/L (ref 5–40)
BASOPHILS ABSOLUTE: 0.1 THOU/MM3 (ref 0–0.1)
BASOPHILS NFR BLD AUTO: 1 %
BILIRUB SERPL-MCNC: 1.3 MG/DL (ref 0.3–1.2)
BUN SERPL-MCNC: 16 MG/DL (ref 7–22)
CALCIUM SERPL-MCNC: 10 MG/DL (ref 8.5–10.5)
CHLORIDE SERPL-SCNC: 103 MEQ/L (ref 98–111)
CHOLEST SERPL-MCNC: 158 MG/DL (ref 100–199)
CO2 SERPL-SCNC: 25 MEQ/L (ref 23–33)
CREAT SERPL-MCNC: 0.8 MG/DL (ref 0.4–1.2)
CREAT UR-MCNC: 183.5 MG/DL
DEPRECATED MEAN GLUCOSE BLD GHB EST-ACNC: 108 MG/DL (ref 70–126)
DEPRECATED RDW RBC AUTO: 43.3 FL (ref 35–45)
EOSINOPHIL NFR BLD AUTO: 2.1 %
EOSINOPHILS ABSOLUTE: 0.1 THOU/MM3 (ref 0–0.4)
ERYTHROCYTE [DISTWIDTH] IN BLOOD BY AUTOMATED COUNT: 12.6 % (ref 11.5–14.5)
GFR SERPL CREATININE-BSD FRML MDRD: 77 ML/MIN/1.73M2
GLUCOSE SERPL-MCNC: 116 MG/DL (ref 70–108)
HBA1C MFR BLD HPLC: 5.6 % (ref 4.4–6.4)
HCT VFR BLD AUTO: 48.5 % (ref 37–47)
HDLC SERPL-MCNC: 55 MG/DL
HGB BLD-MCNC: 15.9 GM/DL (ref 12–16)
IMM GRANULOCYTES # BLD AUTO: 0.02 THOU/MM3 (ref 0–0.07)
IMM GRANULOCYTES NFR BLD AUTO: 0.4 %
LDLC SERPL CALC-MCNC: 77 MG/DL
LYMPHOCYTES ABSOLUTE: 1.8 THOU/MM3 (ref 1–4.8)
LYMPHOCYTES NFR BLD AUTO: 34 %
MCH RBC QN AUTO: 30.8 PG (ref 26–33)
MCHC RBC AUTO-ENTMCNC: 32.8 GM/DL (ref 32.2–35.5)
MCV RBC AUTO: 93.8 FL (ref 81–99)
MICROALBUMIN UR-MCNC: 1.82 MG/DL
MICROALBUMIN/CREAT RATIO PNL UR: 10 MG/G (ref 0–30)
MONOCYTES ABSOLUTE: 0.5 THOU/MM3 (ref 0.4–1.3)
MONOCYTES NFR BLD AUTO: 9.2 %
NEUTROPHILS ABSOLUTE: 2.8 THOU/MM3 (ref 1.8–7.7)
NEUTROPHILS NFR BLD AUTO: 53.3 %
NRBC BLD AUTO-RTO: 0 /100 WBC
PLATELET # BLD AUTO: 191 THOU/MM3 (ref 130–400)
PMV BLD AUTO: 10.3 FL (ref 9.4–12.4)
POTASSIUM SERPL-SCNC: 4.3 MEQ/L (ref 3.5–5.2)
PROT SERPL-MCNC: 6.4 G/DL (ref 6.1–8)
RBC # BLD AUTO: 5.17 MILL/MM3 (ref 4.2–5.4)
SODIUM SERPL-SCNC: 139 MEQ/L (ref 135–145)
T4 FREE SERPL-MCNC: 1.4 NG/DL (ref 0.93–1.68)
TRIGL SERPL-MCNC: 129 MG/DL (ref 0–199)
TSH SERPL DL<=0.005 MIU/L-ACNC: 2.26 UIU/ML (ref 0.4–4.2)
WBC # BLD AUTO: 5.2 THOU/MM3 (ref 4.8–10.8)

## 2024-08-19 ENCOUNTER — HOSPITAL ENCOUNTER (OUTPATIENT)
Age: 74
Discharge: HOME | End: 2024-08-19
Payer: MEDICARE

## 2024-08-19 ENCOUNTER — HOSPITAL ENCOUNTER (OUTPATIENT)
Dept: GENERAL RADIOLOGY | Age: 74
Discharge: HOME | End: 2024-08-19
Payer: MEDICARE

## 2024-08-19 DIAGNOSIS — R06.89 DYSPNEA AND RESPIRATORY ABNORMALITIES: ICD-10-CM

## 2024-08-19 DIAGNOSIS — R06.00 DYSPNEA AND RESPIRATORY ABNORMALITIES: ICD-10-CM

## 2024-08-19 PROCEDURE — 71046 X-RAY EXAM CHEST 2 VIEWS: CPT

## 2024-08-27 ENCOUNTER — OFFICE VISIT (OUTPATIENT)
Dept: FAMILY MEDICINE CLINIC | Age: 74
End: 2024-08-27
Payer: MEDICARE

## 2024-08-27 VITALS
RESPIRATION RATE: 16 BRPM | HEIGHT: 65 IN | SYSTOLIC BLOOD PRESSURE: 132 MMHG | WEIGHT: 291 LBS | BODY MASS INDEX: 48.48 KG/M2 | TEMPERATURE: 98.6 F | HEART RATE: 68 BPM | DIASTOLIC BLOOD PRESSURE: 88 MMHG

## 2024-08-27 DIAGNOSIS — G47.33 OSA (OBSTRUCTIVE SLEEP APNEA): ICD-10-CM

## 2024-08-27 DIAGNOSIS — E11.9 TYPE 2 DIABETES MELLITUS WITHOUT COMPLICATION, WITHOUT LONG-TERM CURRENT USE OF INSULIN (HCC): ICD-10-CM

## 2024-08-27 DIAGNOSIS — I10 PRIMARY HYPERTENSION: ICD-10-CM

## 2024-08-27 DIAGNOSIS — Z00.00 INITIAL MEDICARE ANNUAL WELLNESS VISIT: Primary | ICD-10-CM

## 2024-08-27 DIAGNOSIS — J84.10 PULMONARY INTERSTITIAL FIBROSIS (HCC): ICD-10-CM

## 2024-08-27 DIAGNOSIS — I27.20 PULMONARY HYPERTENSION (HCC): ICD-10-CM

## 2024-08-27 PROCEDURE — 3079F DIAST BP 80-89 MM HG: CPT | Performed by: NURSE PRACTITIONER

## 2024-08-27 PROCEDURE — 1123F ACP DISCUSS/DSCN MKR DOCD: CPT | Performed by: NURSE PRACTITIONER

## 2024-08-27 PROCEDURE — 3075F SYST BP GE 130 - 139MM HG: CPT | Performed by: NURSE PRACTITIONER

## 2024-08-27 PROCEDURE — G0438 PPPS, INITIAL VISIT: HCPCS | Performed by: NURSE PRACTITIONER

## 2024-08-27 PROCEDURE — 3044F HG A1C LEVEL LT 7.0%: CPT | Performed by: NURSE PRACTITIONER

## 2024-08-27 PROCEDURE — 3017F COLORECTAL CA SCREEN DOC REV: CPT | Performed by: NURSE PRACTITIONER

## 2024-08-27 ASSESSMENT — LIFESTYLE VARIABLES
HOW MANY STANDARD DRINKS CONTAINING ALCOHOL DO YOU HAVE ON A TYPICAL DAY: PATIENT DOES NOT DRINK
HOW OFTEN DO YOU HAVE A DRINK CONTAINING ALCOHOL: NEVER

## 2024-08-27 ASSESSMENT — PATIENT HEALTH QUESTIONNAIRE - PHQ9
1. LITTLE INTEREST OR PLEASURE IN DOING THINGS: NOT AT ALL
SUM OF ALL RESPONSES TO PHQ9 QUESTIONS 1 & 2: 0
SUM OF ALL RESPONSES TO PHQ QUESTIONS 1-9: 0
2. FEELING DOWN, DEPRESSED OR HOPELESS: NOT AT ALL

## 2024-08-27 NOTE — PATIENT INSTRUCTIONS
these things to help prevent stress:  Manage your time. This helps you find time to do the things you want and need to do.  Get enough sleep. Your body recovers from the stresses of the day while you are sleeping.  Get support. Your family, friends, and community can make a difference in how you experience stress.  Limit your news feed. Avoid or limit time on social media or news that may make you feel stressed.  Do something active. Exercise or activity can help reduce stress. Walking is a great way to get started.  Where can you learn more?  Go to https://www.vogogo.net/patientEd and enter N032 to learn more about \"Learning About Stress.\"  Current as of: October 24, 2023  Content Version: 14.1  © 2006-2024 [employer].   Care instructions adapted under license by [employer]. If you have questions about a medical condition or this instruction, always ask your healthcare professional. [employer] disclaims any warranty or liability for your use of this information.           Learning About Being Active as an Older Adult  Why is being active important as you get older?     Being active is one of the best things you can do for your health. And it's never too late to start. Being active--or getting active, if you aren't already--has definite benefits. It can:  Give you more energy,  Keep your mind sharp.  Improve balance to reduce your risk of falls.  Help you manage chronic illness with fewer medicines.  No matter how old you are, how fit you are, or what health problems you have, there is a form of activity that will work for you. And the more physical activity you can do, the better your overall health will be.  What kinds of activity can help you stay healthy?  Being more active will make your daily activities easier. Physical activity includes planned exercise and things you do in daily life. There are four types of activity:  Aerobic.  Doing aerobic activity makes your heart and  various assessments and screenings as appropriate.    After reviewing your medical record and screening and assessments performed today your provider may have ordered immunizations, labs, imaging, and/or referrals for you.  A list of these orders (if applicable) as well as your Preventive Care list are included within your After Visit Summary for your review.    Other Preventive Recommendations:    A preventive eye exam performed by an eye specialist is recommended every 1-2 years to screen for glaucoma; cataracts, macular degeneration, and other eye disorders.  A preventive dental visit is recommended every 6 months.  Try to get at least 150 minutes of exercise per week or 10,000 steps per day on a pedometer .  Order or download the FREE \"Exercise & Physical Activity: Your Everyday Guide\" from [employer]. Call 1-829.646.4013 or search [employer] online.  You need 9152-1665 mg of calcium and 8710-9328 IU of vitamin D per day. It is possible to meet your calcium requirement with diet alone, but a vitamin D supplement is usually necessary to meet this goal.  When exposed to the sun, use a sunscreen that protects against both UVA and UVB radiation with an SPF of 30 or greater. Reapply every 2 to 3 hours or after sweating, drying off with a towel, or swimming.  Always wear a seat belt when traveling in a car. Always wear a helmet when riding a bicycle or motorcycle.

## 2024-08-27 NOTE — PROGRESS NOTES
[facility]  [facility]  [address] [facility] [address]  Dept: [phone]  Dept Fax: [phone]  Loc: [phone]      Medicare Annual Wellness Visit    Dara Sanz is here for Medicare AWV    Assessment & Plan   Initial Medicare annual wellness visit  Primary hypertension  Pulmonary hypertension (HCC)  Pulmonary interstitial fibrosis (HCC)  Type 2 diabetes mellitus without complication, without long-term current use of insulin (HCC)  -     Hemoglobin A1C; Future  -     Comprehensive Metabolic Panel; Future  -     CBC with Auto Differential; Future  Body mass index (BMI) 45.0-49.9, adult (HCC)  JENNY (obstructive sleep apnea)     - Colonoscopy- Done per Dr Oconnor.  Pt thinks she is UTD.   - Follow up with [facility] as planned in March.    - Labs in 6 months before next appt.  - Follow up with Urology as planned  - work on diet and exercise.  Recommended [facility] center or [place] for exercise.     Recommendations for Preventive Services Due: see orders and patient instructions/AVS.  Recommended screening schedule for the next 5-10 years is provided to the patient in written form: see Patient Instructions/AVS.     Return in 6 months (on 2/27/2025).     Subjective   The following acute and/or chronic problems were also addressed today:    Pt presents to the office today for new patient appt. She was previously with TONJA Staples CNP. Pt follows up with [facility] diabetic clinic and Dr Fonseca/[facility] for cardiology. Doing well overall.  Recent follow up with urology for kidney stone.  Aug 12, 2024 had skin cancer removed from right arm.      Treatment Adherence:   Medication compliance:  compliant all of the time  Diet compliance:  compliant most of the time  Weight trend: stable  Current exercise: no regular exercise  Barriers: impairment:  physical: overall weakness and fear of falling. She does have a [place] and she plans on working out on it soon.      Diabetes Mellitus Type 2:

## 2024-09-19 ENCOUNTER — HOSPITAL ENCOUNTER (OUTPATIENT)
Age: 74
Discharge: HOME | End: 2024-09-21
Payer: MEDICARE

## 2024-09-19 ENCOUNTER — HOSPITAL ENCOUNTER (OUTPATIENT)
Dept: CT IMAGING | Age: 74
Discharge: HOME | End: 2024-09-19
Payer: MEDICARE

## 2024-09-19 ENCOUNTER — HOSPITAL ENCOUNTER (OUTPATIENT)
Dept: PULMONOLOGY | Age: 74
Discharge: HOME | End: 2024-09-19
Payer: MEDICARE

## 2024-09-19 VITALS
HEIGHT: 65 IN | BODY MASS INDEX: 48.48 KG/M2 | DIASTOLIC BLOOD PRESSURE: 88 MMHG | WEIGHT: 291 LBS | SYSTOLIC BLOOD PRESSURE: 132 MMHG

## 2024-09-19 DIAGNOSIS — R06.09 DYSPNEA ON EXERTION: ICD-10-CM

## 2024-09-19 LAB
ECHO AO ASC DIAM: 3.3 CM
ECHO AO ASCENDING AORTA INDEX: 1.42 CM/M2
ECHO AV CUSP MM: 1.8 CM
ECHO AV PEAK GRADIENT: 9 MMHG
ECHO AV PEAK VELOCITY: 1.5 M/S
ECHO AV VELOCITY RATIO: 0.6
ECHO BSA: 2.46 M2
ECHO EST RA PRESSURE: 5 MMHG
ECHO LA AREA 2C: 17 CM2
ECHO LA AREA 4C: 18.2 CM2
ECHO LA DIAMETER INDEX: 1.64 CM/M2
ECHO LA DIAMETER: 3.8 CM
ECHO LA MAJOR AXIS: 5.7 CM
ECHO LA MINOR AXIS: 5.5 CM
ECHO LA VOL BP: 45 ML (ref 22–52)
ECHO LA VOL MOD A2C: 44 ML (ref 22–52)
ECHO LA VOL MOD A4C: 46 ML (ref 22–52)
ECHO LA VOL/BSA BIPLANE: 19 ML/M2 (ref 16–34)
ECHO LA VOLUME INDEX MOD A2C: 19 ML/M2 (ref 16–34)
ECHO LA VOLUME INDEX MOD A4C: 20 ML/M2 (ref 16–34)
ECHO LV E' LATERAL VELOCITY: 8 CM/S
ECHO LV E' SEPTAL VELOCITY: 7 CM/S
ECHO LV EJECTION FRACTION BIPLANE: 63 % (ref 55–100)
ECHO LV FRACTIONAL SHORTENING: 35 % (ref 28–44)
ECHO LV INTERNAL DIMENSION DIASTOLE INDEX: 2.24 CM/M2
ECHO LV INTERNAL DIMENSION DIASTOLIC: 5.2 CM (ref 3.9–5.3)
ECHO LV INTERNAL DIMENSION SYSTOLIC INDEX: 1.47 CM/M2
ECHO LV INTERNAL DIMENSION SYSTOLIC: 3.4 CM
ECHO LV ISOVOLUMETRIC RELAXATION TIME (IVRT): 56 MS
ECHO LV IVSD: 1.1 CM (ref 0.6–0.9)
ECHO LV MASS 2D: 207.3 G (ref 67–162)
ECHO LV MASS INDEX 2D: 89.3 G/M2 (ref 43–95)
ECHO LV POSTERIOR WALL DIASTOLIC: 1 CM (ref 0.6–0.9)
ECHO LV RELATIVE WALL THICKNESS RATIO: 0.38
ECHO LVOT PEAK GRADIENT: 3 MMHG
ECHO LVOT PEAK VELOCITY: 0.9 M/S
ECHO MV A VELOCITY: 0.75 M/S
ECHO MV E DECELERATION TIME (DT): 175 MS
ECHO MV E VELOCITY: 0.51 M/S
ECHO MV E/A RATIO: 0.68
ECHO MV E/E' LATERAL: 6.38
ECHO MV E/E' RATIO (AVERAGED): 6.83
ECHO MV E/E' SEPTAL: 7.29
ECHO PV MAX VELOCITY: 0.7 M/S
ECHO PV PEAK GRADIENT: 2 MMHG
ECHO RV INTERNAL DIMENSION: 3.2 CM
ECHO TV E WAVE: 0.5 M/S

## 2024-09-19 PROCEDURE — 71250 CT THORAX DX C-: CPT

## 2024-09-19 PROCEDURE — 94060 EVALUATION OF WHEEZING: CPT

## 2024-09-19 PROCEDURE — 93306 TTE W/DOPPLER COMPLETE: CPT

## 2024-09-19 PROCEDURE — 94726 PLETHYSMOGRAPHY LUNG VOLUMES: CPT

## 2024-09-19 PROCEDURE — 94729 DIFFUSING CAPACITY: CPT

## 2024-11-21 ENCOUNTER — HOSPITAL ENCOUNTER (OUTPATIENT)
Dept: CT IMAGING | Age: 74
Discharge: HOME | End: 2024-11-21
Payer: MEDICARE

## 2024-11-21 DIAGNOSIS — R06.09 DYSPNEA ON EXERTION: ICD-10-CM

## 2024-11-21 PROCEDURE — 71250 CT THORAX DX C-: CPT

## 2024-11-27 ENCOUNTER — HOSPITAL ENCOUNTER (EMERGENCY)
Age: 74
Discharge: HOME | End: 2024-11-27
Payer: MEDICARE

## 2024-11-27 ENCOUNTER — APPOINTMENT (OUTPATIENT)
Dept: GENERAL RADIOLOGY | Age: 74
End: 2024-11-27
Payer: MEDICARE

## 2024-11-27 VITALS
HEART RATE: 75 BPM | OXYGEN SATURATION: 92 % | WEIGHT: 290 LBS | RESPIRATION RATE: 20 BRPM | DIASTOLIC BLOOD PRESSURE: 79 MMHG | SYSTOLIC BLOOD PRESSURE: 135 MMHG | TEMPERATURE: 98.1 F | BODY MASS INDEX: 48.26 KG/M2

## 2024-11-27 DIAGNOSIS — S61.511A TEAR OF SKIN OF RIGHT WRIST, INITIAL ENCOUNTER: ICD-10-CM

## 2024-11-27 DIAGNOSIS — S93.402A SPRAIN OF LEFT ANKLE, UNSPECIFIED LIGAMENT, INITIAL ENCOUNTER: Primary | ICD-10-CM

## 2024-11-27 DIAGNOSIS — S93.401A SPRAIN OF RIGHT ANKLE, INITIAL ENCOUNTER: ICD-10-CM

## 2024-11-27 PROCEDURE — 99214 OFFICE O/P EST MOD 30 MIN: CPT

## 2024-11-27 PROCEDURE — 99214 OFFICE O/P EST MOD 30 MIN: CPT | Performed by: NURSE PRACTITIONER

## 2024-11-27 PROCEDURE — 73610 X-RAY EXAM OF ANKLE: CPT

## 2024-11-27 PROCEDURE — 6370000000 HC RX 637 (ALT 250 FOR IP): Performed by: NURSE PRACTITIONER

## 2024-11-27 RX ORDER — NAPROXEN 500 MG/1
500 TABLET ORAL 2 TIMES DAILY WITH MEALS
Qty: 20 TABLET | Refills: 0 | Status: SHIPPED | OUTPATIENT
Start: 2024-11-27 | End: 2024-12-07

## 2024-11-27 RX ORDER — BACITRACIN ZINC 500 [USP'U]/G
OINTMENT TOPICAL ONCE
Status: COMPLETED | OUTPATIENT
Start: 2024-11-27 | End: 2024-11-27

## 2024-11-27 RX ORDER — ACETAMINOPHEN 325 MG/1
650 TABLET ORAL EVERY 6 HOURS PRN
COMMUNITY
Start: 2024-11-27

## 2024-11-27 RX ADMIN — BACITRACIN ZINC: 500 OINTMENT TOPICAL at 16:30

## 2024-11-27 ASSESSMENT — ENCOUNTER SYMPTOMS: SHORTNESS OF BREATH: 0

## 2024-11-27 ASSESSMENT — PAIN DESCRIPTION - ONSET: ONSET: SUDDEN

## 2024-11-27 ASSESSMENT — PAIN DESCRIPTION - PAIN TYPE: TYPE: ACUTE PAIN

## 2024-11-27 ASSESSMENT — PAIN DESCRIPTION - DESCRIPTORS: DESCRIPTORS: SHARP

## 2024-11-27 ASSESSMENT — PAIN - FUNCTIONAL ASSESSMENT
PAIN_FUNCTIONAL_ASSESSMENT: 0-10
PAIN_FUNCTIONAL_ASSESSMENT: PREVENTS OR INTERFERES WITH MANY ACTIVE NOT PASSIVE ACTIVITIES

## 2024-11-27 ASSESSMENT — PAIN DESCRIPTION - ORIENTATION: ORIENTATION: LEFT

## 2024-11-27 ASSESSMENT — PAIN DESCRIPTION - LOCATION: LOCATION: ANKLE

## 2024-11-27 ASSESSMENT — PAIN DESCRIPTION - FREQUENCY: FREQUENCY: CONTINUOUS

## 2024-11-27 ASSESSMENT — PAIN SCALES - GENERAL: PAINLEVEL_OUTOF10: 10

## 2024-11-27 NOTE — ED PROVIDER NOTES
[facility] URGENT CARE  Urgent Care Encounter       CHIEF COMPLAINT       Chief Complaint   Patient presents with    Fall     Fell outside last night in the yard, left ankle is hurting pretty bad, left ankle is also sore. \"I had to army crawl back to the house\"  She reports she has 7 or 9 screws in her right ankle currently       Nurses Notes reviewed and I agree except as noted in the HPI.  HISTORY OF PRESENT ILLNESS   Dara Sanz is a 74 y.o. female who presents with complaints of bilateral ankle pain that started 1 day ago after she reportedly fell in her backyard after rolling her ankle on a stone.  She reports being unable to bear weight to her ankle.  She is able to stand on her heel but is unable to walk.  Reports she had to crawl back in the house.  She previously had ankle surgery 13 years ago and has hardware.  She denies any bruising.  Reports lateral ankle pain.  Has not tried any treatment or taken any medications for treatment.  Denies any numbness, tingling, or radiation of pain.    She also reports a skin tear to her right wrist.    The history is provided by the patient.       REVIEW OF SYSTEMS     Review of Systems   Constitutional:  Negative for activity change and fever.   Respiratory:  Negative for shortness of breath.    Cardiovascular:  Negative for leg swelling.   Musculoskeletal:  Positive for arthralgias (bilateral ankles) and gait problem. Negative for joint swelling and myalgias.   Skin:  Positive for wound (skin tear right wrist).   Neurological:  Negative for weakness and numbness.       PAST MEDICAL HISTORY         Diagnosis Date    Anxiety     Arthritis     CAD (coronary artery disease)     GERD (gastroesophageal reflux disease)     History of kidney stones     Hyperlipidemia     Hypertension     Kidney stone 2011    Osteoarthritis     Sleep apnea        SURGICALHISTORY     Patient  has a past surgical history that includes Colonoscopy (01/2012); Dilation and  Pneumococcal, PCV20, PREVNAR 20, (age 6w+), IM, 0.5mL 10/17/2023    Pneumococcal, PPSV23, PNEUMOVAX 23, (age 2y+), SC/IM, 0.5mL 09/28/2018       FAMILY HISTORY     Patient's family history includes Arthritis in her mother; Breast Cancer (age of onset: 90) in her mother; Depression in her brother, mother, and sister; Heart Attack in her mother; Heart Disease in her father; High Blood Pressure in her father; High Cholesterol in her father; Obesity in her brother, father, mother, and sister.    SOCIAL HISTORY     Patient  reports that she has never smoked. She has never used smokeless tobacco. She reports that she does not drink alcohol and does not use drugs.    PHYSICAL EXAM     ED TRIAGE VITALS  BP: 135/79, Temp: 98.1 °F (36.7 °C), Pulse: 75, Respirations: 20, SpO2: 92 %,Estimated body mass index is 48.26 kg/m² as calculated from the following:    Height as of 9/19/24: 1.651 m (5' 5\").    Weight as of this encounter: 131.5 kg (290 lb).,No LMP recorded. Patient is postmenopausal.    Physical Exam  Vitals and nursing note reviewed.   Constitutional:       Appearance: She is morbidly obese.   Cardiovascular:      Rate and Rhythm: Normal rate.      Pulses: Normal pulses.   Pulmonary:      Effort: Pulmonary effort is normal.   Musculoskeletal:         General: No swelling.      Right ankle: No swelling or ecchymosis. Tenderness present. Normal range of motion.      Left ankle: No swelling or ecchymosis. Tenderness present. Normal range of motion.   Skin:     General: Skin is warm and dry.      Findings: Laceration (2cm skin tear right wrist) present. No bruising or erythema.   Neurological:      Mental Status: She is alert and oriented to person, place, and time.         DIAGNOSTIC RESULTS     Labs:No results found for this visit on 11/27/24.    IMAGING:    XR ANKLE LEFT (MIN 3 VIEWS)   Final Result      No fracture or dislocation.            **This report has been created using voice recognition software. It may contain

## 2025-02-17 ENCOUNTER — TELEPHONE (OUTPATIENT)
Dept: CARDIOLOGY CLINIC | Age: 75
End: 2025-02-17

## 2025-02-17 ENCOUNTER — OFFICE VISIT (OUTPATIENT)
Dept: CARDIOLOGY CLINIC | Age: 75
End: 2025-02-17
Payer: MEDICARE

## 2025-02-17 VITALS
HEART RATE: 72 BPM | HEIGHT: 65 IN | BODY MASS INDEX: 48.26 KG/M2 | SYSTOLIC BLOOD PRESSURE: 133 MMHG | DIASTOLIC BLOOD PRESSURE: 82 MMHG

## 2025-02-17 DIAGNOSIS — I63.9 CEREBROVASCULAR ACCIDENT (CVA), UNSPECIFIED MECHANISM (HCC): Primary | ICD-10-CM

## 2025-02-17 PROCEDURE — 99214 OFFICE O/P EST MOD 30 MIN: CPT | Performed by: INTERNAL MEDICINE

## 2025-02-17 PROCEDURE — 1090F PRES/ABSN URINE INCON ASSESS: CPT | Performed by: INTERNAL MEDICINE

## 2025-02-17 PROCEDURE — 3079F DIAST BP 80-89 MM HG: CPT | Performed by: INTERNAL MEDICINE

## 2025-02-17 PROCEDURE — 3017F COLORECTAL CA SCREEN DOC REV: CPT | Performed by: INTERNAL MEDICINE

## 2025-02-17 PROCEDURE — 3075F SYST BP GE 130 - 139MM HG: CPT | Performed by: INTERNAL MEDICINE

## 2025-02-17 PROCEDURE — 1123F ACP DISCUSS/DSCN MKR DOCD: CPT | Performed by: INTERNAL MEDICINE

## 2025-02-17 PROCEDURE — G8428 CUR MEDS NOT DOCUMENT: HCPCS | Performed by: INTERNAL MEDICINE

## 2025-02-17 PROCEDURE — 1036F TOBACCO NON-USER: CPT | Performed by: INTERNAL MEDICINE

## 2025-02-17 PROCEDURE — G8399 PT W/DXA RESULTS DOCUMENT: HCPCS | Performed by: INTERNAL MEDICINE

## 2025-02-17 PROCEDURE — G8417 CALC BMI ABV UP PARAM F/U: HCPCS | Performed by: INTERNAL MEDICINE

## 2025-02-17 RX ORDER — HYDROCHLOROTHIAZIDE 25 MG/1
25 TABLET ORAL DAILY
COMMUNITY

## 2025-02-17 RX ORDER — FUROSEMIDE 20 MG/1
20 TABLET ORAL 2 TIMES DAILY
COMMUNITY

## 2025-02-17 NOTE — PROGRESS NOTES
[facility] [place] [facility]  [facility] CARDIOLOGY  [address]  [address]  [facility] [address]  Dept: [phone]  Dept Fax: [phone]  Loc: [phone]    Visit Date: 2/17/2025    Ms. Sanz is a 74 y.o. female  who presented for:  Chief Complaint   Patient presents with    1 Year Follow Up    Hypertension       HPI:   73 yo F c hx of HTN, HLD, RA, JENNY on CPAP is here for a follow up. Used to see Dr. Fonseca.  BMI 48.  Has poor exercise tolerance. Has joint aches. Was recently in the [facility] for CVA.  She has left residual weakness.    Daughter (Johanna) states she was given lasix and had CHF as a diagnosis.    Patient denies any shortness of breath, orthopnea or pedal edema.          Current Outpatient Medications:     furosemide (LASIX) 20 MG tablet, Take 1 tablet by mouth 2 times daily, Disp: , Rfl:     hydroCHLOROthiazide (HYDRODIURIL) 25 MG tablet, Take 1 tablet by mouth daily, Disp: , Rfl:     acetaminophen (AMINOFEN) 325 MG tablet, Take 2 tablets by mouth every 6 hours as needed for Pain, Disp: , Rfl:     atorvastatin (LIPITOR) 40 MG tablet, Take 1 tablet by mouth daily, Disp: 90 tablet, Rfl: 3    metoprolol tartrate (LOPRESSOR) 50 MG tablet, Take 1 tablet by mouth 2 times daily, Disp: 180 tablet, Rfl: 3    ramipril (ALTACE) 10 MG capsule, Take 1 capsule by mouth daily, Disp: 90 capsule, Rfl: 3    Omega 3 1000 MG CAPS, Take by mouth 2 tablets daily, Disp: , Rfl:     MAGNESIUM CITRATE PO, Take by mouth 400 mg daily, Disp: , Rfl:     Ascorbic Acid (VITAMIN C) 1000 MG tablet, Take 1 tablet by mouth daily, Disp: , Rfl:     Zinc Sulfate (ZINC-220 PO), Take by mouth daily One tablet - unsure of dosage, Disp: , Rfl:     Multiple Vitamins-Minerals (MULTIVITAMIN ADULT EXTRA C PO), , Disp: , Rfl:     Cholecalciferol (VITAMIN D3) 50 MCG (2000 UT) TABS, Take by mouth daily, Disp: , Rfl:     Black Elderberry (SAMBUCUS ELDERBERRY PO), Take 1,250 mg by mouth, Disp: , Rfl:     NONFORMULARY,

## 2025-02-17 NOTE — PROGRESS NOTES
1 year follow up.    EKG done 6-.    Denies cp, sob, palpitations, dizziness, lightheaded and BLACK.    Nursing home did not send a list of medications with patient today.

## 2025-02-25 ENCOUNTER — TELEPHONE (OUTPATIENT)
Dept: FAMILY MEDICINE CLINIC | Age: 75
End: 2025-02-25

## 2025-02-25 DIAGNOSIS — I10 PRIMARY HYPERTENSION: Primary | ICD-10-CM

## 2025-02-25 DIAGNOSIS — J84.10 PULMONARY INTERSTITIAL FIBROSIS (HCC): ICD-10-CM

## 2025-02-25 DIAGNOSIS — I27.20 PULMONARY HYPERTENSION (HCC): ICD-10-CM

## 2025-02-25 DIAGNOSIS — E11.9 TYPE 2 DIABETES MELLITUS WITHOUT COMPLICATION, WITHOUT LONG-TERM CURRENT USE OF INSULIN (HCC): ICD-10-CM

## 2025-02-25 NOTE — TELEPHONE ENCOUNTER
Call received from pt's grandson, Jimbo. States that pt recently suffered a stroke that affected her left side. He is inquiring about getting rx for a handicap placard. If provider agreeable, Jimbo would like to  today.  Call him back at [phone].    Patient does have appt with this office scheduled on 3/3/25.

## 2025-03-03 ENCOUNTER — OFFICE VISIT (OUTPATIENT)
Dept: FAMILY MEDICINE CLINIC | Age: 75
End: 2025-03-03
Payer: MEDICARE

## 2025-03-03 VITALS
HEART RATE: 72 BPM | SYSTOLIC BLOOD PRESSURE: 116 MMHG | DIASTOLIC BLOOD PRESSURE: 70 MMHG | RESPIRATION RATE: 16 BRPM | WEIGHT: 276.2 LBS | TEMPERATURE: 97.6 F | BODY MASS INDEX: 45.96 KG/M2

## 2025-03-03 DIAGNOSIS — G47.33 OSA (OBSTRUCTIVE SLEEP APNEA): ICD-10-CM

## 2025-03-03 DIAGNOSIS — E11.9 TYPE 2 DIABETES MELLITUS WITHOUT COMPLICATION, WITHOUT LONG-TERM CURRENT USE OF INSULIN (HCC): Primary | ICD-10-CM

## 2025-03-03 DIAGNOSIS — I10 PRIMARY HYPERTENSION: ICD-10-CM

## 2025-03-03 DIAGNOSIS — I27.20 PULMONARY HYPERTENSION (HCC): ICD-10-CM

## 2025-03-03 DIAGNOSIS — E78.2 MIXED HYPERLIPIDEMIA: ICD-10-CM

## 2025-03-03 DIAGNOSIS — G81.94 HEMIPLEGIA AFFECTING LEFT NONDOMINANT SIDE, UNSPECIFIED ETIOLOGY, UNSPECIFIED HEMIPLEGIA TYPE (HCC): ICD-10-CM

## 2025-03-03 DIAGNOSIS — J84.10 PULMONARY INTERSTITIAL FIBROSIS (HCC): ICD-10-CM

## 2025-03-03 PROBLEM — I63.9 CVA (CEREBRAL VASCULAR ACCIDENT) (HCC): Status: ACTIVE | Noted: 2025-03-03

## 2025-03-03 PROBLEM — E66.813 OBESITY, CLASS III, BMI 40-49.9 (MORBID OBESITY) (HCC): Status: ACTIVE | Noted: 2024-11-13

## 2025-03-03 PROBLEM — E66.01 OBESITY, CLASS III, BMI 40-49.9 (MORBID OBESITY): Status: ACTIVE | Noted: 2024-11-13

## 2025-03-03 PROCEDURE — 1036F TOBACCO NON-USER: CPT | Performed by: NURSE PRACTITIONER

## 2025-03-03 PROCEDURE — 1159F MED LIST DOCD IN RCRD: CPT | Performed by: NURSE PRACTITIONER

## 2025-03-03 PROCEDURE — 1123F ACP DISCUSS/DSCN MKR DOCD: CPT | Performed by: NURSE PRACTITIONER

## 2025-03-03 PROCEDURE — 99214 OFFICE O/P EST MOD 30 MIN: CPT | Performed by: NURSE PRACTITIONER

## 2025-03-03 PROCEDURE — G2211 COMPLEX E/M VISIT ADD ON: HCPCS | Performed by: NURSE PRACTITIONER

## 2025-03-03 PROCEDURE — G8399 PT W/DXA RESULTS DOCUMENT: HCPCS | Performed by: NURSE PRACTITIONER

## 2025-03-03 PROCEDURE — 3074F SYST BP LT 130 MM HG: CPT | Performed by: NURSE PRACTITIONER

## 2025-03-03 PROCEDURE — G8427 DOCREV CUR MEDS BY ELIG CLIN: HCPCS | Performed by: NURSE PRACTITIONER

## 2025-03-03 PROCEDURE — 2022F DILAT RTA XM EVC RTNOPTHY: CPT | Performed by: NURSE PRACTITIONER

## 2025-03-03 PROCEDURE — G8417 CALC BMI ABV UP PARAM F/U: HCPCS | Performed by: NURSE PRACTITIONER

## 2025-03-03 PROCEDURE — 1160F RVW MEDS BY RX/DR IN RCRD: CPT | Performed by: NURSE PRACTITIONER

## 2025-03-03 PROCEDURE — 1090F PRES/ABSN URINE INCON ASSESS: CPT | Performed by: NURSE PRACTITIONER

## 2025-03-03 PROCEDURE — 3046F HEMOGLOBIN A1C LEVEL >9.0%: CPT | Performed by: NURSE PRACTITIONER

## 2025-03-03 PROCEDURE — 3017F COLORECTAL CA SCREEN DOC REV: CPT | Performed by: NURSE PRACTITIONER

## 2025-03-03 PROCEDURE — 3078F DIAST BP <80 MM HG: CPT | Performed by: NURSE PRACTITIONER

## 2025-03-03 RX ORDER — CLOPIDOGREL BISULFATE 75 MG/1
75 TABLET ORAL DAILY
COMMUNITY
Start: 2024-12-17

## 2025-03-03 RX ORDER — ESCITALOPRAM OXALATE 10 MG/1
10 TABLET ORAL DAILY
COMMUNITY
Start: 2025-01-08

## 2025-03-03 SDOH — ECONOMIC STABILITY: FOOD INSECURITY: WITHIN THE PAST 12 MONTHS, YOU WORRIED THAT YOUR FOOD WOULD RUN OUT BEFORE YOU GOT MONEY TO BUY MORE.: NEVER TRUE

## 2025-03-03 SDOH — ECONOMIC STABILITY: FOOD INSECURITY: WITHIN THE PAST 12 MONTHS, THE FOOD YOU BOUGHT JUST DIDN'T LAST AND YOU DIDN'T HAVE MONEY TO GET MORE.: NEVER TRUE

## 2025-03-03 ASSESSMENT — PATIENT HEALTH QUESTIONNAIRE - PHQ9
SUM OF ALL RESPONSES TO PHQ QUESTIONS 1-9: 9
6. FEELING BAD ABOUT YOURSELF - OR THAT YOU ARE A FAILURE OR HAVE LET YOURSELF OR YOUR FAMILY DOWN: NOT AT ALL
2. FEELING DOWN, DEPRESSED OR HOPELESS: NEARLY EVERY DAY
1. LITTLE INTEREST OR PLEASURE IN DOING THINGS: NOT AT ALL
8. MOVING OR SPEAKING SO SLOWLY THAT OTHER PEOPLE COULD HAVE NOTICED. OR THE OPPOSITE, BEING SO FIGETY OR RESTLESS THAT YOU HAVE BEEN MOVING AROUND A LOT MORE THAN USUAL: NOT AT ALL
SUM OF ALL RESPONSES TO PHQ QUESTIONS 1-9: 9
9. THOUGHTS THAT YOU WOULD BE BETTER OFF DEAD, OR OF HURTING YOURSELF: NOT AT ALL
SUM OF ALL RESPONSES TO PHQ QUESTIONS 1-9: 9
SUM OF ALL RESPONSES TO PHQ QUESTIONS 1-9: 9
7. TROUBLE CONCENTRATING ON THINGS, SUCH AS READING THE NEWSPAPER OR WATCHING TELEVISION: NOT AT ALL
3. TROUBLE FALLING OR STAYING ASLEEP: NEARLY EVERY DAY
4. FEELING TIRED OR HAVING LITTLE ENERGY: NEARLY EVERY DAY
10. IF YOU CHECKED OFF ANY PROBLEMS, HOW DIFFICULT HAVE THESE PROBLEMS MADE IT FOR YOU TO DO YOUR WORK, TAKE CARE OF THINGS AT HOME, OR GET ALONG WITH OTHER PEOPLE: NOT DIFFICULT AT ALL
5. POOR APPETITE OR OVEREATING: NOT AT ALL

## 2025-03-03 ASSESSMENT — ENCOUNTER SYMPTOMS
TROUBLE SWALLOWING: 0
COUGH: 0
NAUSEA: 0
EYE PAIN: 0
BACK PAIN: 0
SINUS PAIN: 0
SORE THROAT: 0
SHORTNESS OF BREATH: 0
FACIAL SWELLING: 0
VOMITING: 0
COLOR CHANGE: 0
WHEEZING: 0
ABDOMINAL PAIN: 0
DIARRHEA: 0

## 2025-03-03 NOTE — PROGRESS NOTES
Health Maintenance Due   Topic Date Due    Hepatitis C screen  Never done    DTaP/Tdap/Td vaccine (1 - Tdap) Never done    Colorectal Cancer Screen  Never done    Shingles vaccine (1 of 2) Never done    Diabetic foot exam  09/27/2022    Diabetic retinal exam  07/29/2023    Flu vaccine (1) 08/01/2024    COVID-19 Vaccine (1 - 2024-25 season) Never done    Respiratory Syncytial Virus (RSV) Pregnant or age 60 yrs+ (1 - 1-dose 75+ series) Never done

## 2025-03-03 NOTE — PROGRESS NOTES
[facility]  [facility]  [address] [address]  Dept: [phone]  Dept Fax: [phone]  Loc: [phone]     3/3/2025     Dara Sanz (:  1950) is a 74 y.o. female, here for evaluation of the following medical concerns:    Chief Complaint   Patient presents with    Follow-up     6 mo f/u for hypertension. Had a stroke 24 [facility]       Pt presents to the office today for 6 month follow up.  She was previously with J Bel CNP. Pt follows up with [facility] diabetic clinic and Dr Fonseca/Rell for cardiology. Pt reports that she had a Stoke in Dec 2024 at [facility].  Was at the [facility] until 2025. Left sided hemiparesis.  Working with PT.  She also had a heart monitor for a month.  Home PT 3 days weekly.  Very tired.  She has followed up  with [facility] Cardiology since her stoke.      Treatment Adherence:   Medication compliance:  compliant all of the time  Diet compliance:  compliant most of the time  Weight trend: stable  Current exercise: no regular exercise  Barriers: impairment:  physical: recent stroke and left sided weakness.     Diabetes Mellitus Type 2: Current symptoms/problems include none.    Home blood sugar records: patient does not test  Any episodes of hypoglycemia? no  Tobacco history: She  reports that she has never smoked. She has never used smokeless tobacco.     Hypertension:  Home blood pressure monitoring: No.  She is adherent to a low sodium diet. Patient denies chest pain, shortness of breath, headache, and lightheadedness.  Antihypertensive medication side effects: no medication side effects noted.  Use of agents associated with hypertension: none.     Hyperlipidemia:  No new myalgias or GI upset on atorvastatin (Lipitor).       Lab Results   Component Value Date    LABA1C 5.6 2024    LABA1C 5.6 2023    LABA1C 5.6 2022     Lab Results   Component Value Date    CREATININE 0.8 2024     Lab Results

## 2025-03-07 ENCOUNTER — TELEPHONE (OUTPATIENT)
Dept: FAMILY MEDICINE CLINIC | Age: 75
End: 2025-03-07

## 2025-03-07 ENCOUNTER — HOSPITAL ENCOUNTER (OUTPATIENT)
Age: 75
Setting detail: SPECIMEN
Discharge: HOME | End: 2025-03-07
Payer: MEDICARE

## 2025-03-07 DIAGNOSIS — I10 PRIMARY HYPERTENSION: ICD-10-CM

## 2025-03-07 DIAGNOSIS — E11.9 TYPE 2 DIABETES MELLITUS WITHOUT COMPLICATION, WITHOUT LONG-TERM CURRENT USE OF INSULIN (HCC): ICD-10-CM

## 2025-03-07 DIAGNOSIS — N28.9 RENAL INSUFFICIENCY: Primary | ICD-10-CM

## 2025-03-07 LAB
ALBUMIN SERPL BCG-MCNC: 4 G/DL (ref 3.4–4.9)
ALP SERPL-CCNC: 74 U/L (ref 35–104)
ALT SERPL W/O P-5'-P-CCNC: 23 U/L (ref 10–35)
ANION GAP SERPL CALC-SCNC: 10 MEQ/L (ref 8–16)
AST SERPL-CCNC: 26 U/L (ref 10–35)
BASOPHILS ABSOLUTE: 0 THOU/MM3 (ref 0–0.1)
BASOPHILS NFR BLD AUTO: 0.8 %
BILIRUB SERPL-MCNC: 1.4 MG/DL (ref 0.3–1.2)
BUN SERPL-MCNC: 28 MG/DL (ref 8–23)
CALCIUM SERPL-MCNC: 11 MG/DL (ref 8.8–10.2)
CHLORIDE SERPL-SCNC: 102 MEQ/L (ref 98–111)
CO2 SERPL-SCNC: 28 MEQ/L (ref 22–29)
CREAT SERPL-MCNC: 0.8 MG/DL (ref 0.5–0.9)
DEPRECATED MEAN GLUCOSE BLD GHB EST-ACNC: 105 MG/DL (ref 70–126)
DEPRECATED RDW RBC AUTO: 45.1 FL (ref 35–45)
EOSINOPHIL NFR BLD AUTO: 3 %
EOSINOPHILS ABSOLUTE: 0.1 THOU/MM3 (ref 0–0.4)
ERYTHROCYTE [DISTWIDTH] IN BLOOD BY AUTOMATED COUNT: 13.2 % (ref 11.5–14.5)
GFR SERPL CREATININE-BSD FRML MDRD: 77 ML/MIN/1.73M2
GLUCOSE SERPL-MCNC: 116 MG/DL (ref 74–109)
HBA1C MFR BLD HPLC: 5.5 % (ref 4–6)
HCT VFR BLD AUTO: 45.4 % (ref 37–47)
HGB BLD-MCNC: 15.6 GM/DL (ref 12–16)
IMM GRANULOCYTES # BLD AUTO: 0.03 THOU/MM3 (ref 0–0.07)
IMM GRANULOCYTES NFR BLD AUTO: 0.6 %
LYMPHOCYTES ABSOLUTE: 1.1 THOU/MM3 (ref 1–4.8)
LYMPHOCYTES NFR BLD AUTO: 23 %
MCH RBC QN AUTO: 31.6 PG (ref 26–33)
MCHC RBC AUTO-ENTMCNC: 34.4 GM/DL (ref 32.2–35.5)
MCV RBC AUTO: 92.1 FL (ref 81–99)
MONOCYTES ABSOLUTE: 0.4 THOU/MM3 (ref 0.4–1.3)
MONOCYTES NFR BLD AUTO: 9.3 %
NEUTROPHILS ABSOLUTE: 3 THOU/MM3 (ref 1.8–7.7)
NEUTROPHILS NFR BLD AUTO: 63.3 %
NRBC BLD AUTO-RTO: 0 /100 WBC
PLATELET # BLD AUTO: 220 THOU/MM3 (ref 130–400)
PMV BLD AUTO: 10.3 FL (ref 9.4–12.4)
POTASSIUM SERPL-SCNC: 4.2 MEQ/L (ref 3.5–5.2)
PROT SERPL-MCNC: 6.6 G/DL (ref 6.4–8.3)
RBC # BLD AUTO: 4.93 MILL/MM3 (ref 4.2–5.4)
SODIUM SERPL-SCNC: 140 MEQ/L (ref 135–145)
T4 FREE SERPL-MCNC: 1.5 NG/DL (ref 0.92–1.68)
TSH SERPL DL<=0.05 MIU/L-ACNC: 1.14 UIU/ML (ref 0.27–4.2)
WBC # BLD AUTO: 4.7 THOU/MM3 (ref 4.8–10.8)

## 2025-03-07 PROCEDURE — 84439 ASSAY OF FREE THYROXINE: CPT

## 2025-03-07 PROCEDURE — 83036 HEMOGLOBIN GLYCOSYLATED A1C: CPT

## 2025-03-07 PROCEDURE — 80053 COMPREHEN METABOLIC PANEL: CPT

## 2025-03-07 PROCEDURE — 36415 COLL VENOUS BLD VENIPUNCTURE: CPT

## 2025-03-07 PROCEDURE — 84443 ASSAY THYROID STIM HORMONE: CPT

## 2025-03-07 PROCEDURE — 85025 COMPLETE CBC W/AUTO DIFF WBC: CPT

## 2025-03-07 NOTE — TELEPHONE ENCOUNTER
----- Message from MARIA INES Koo - CNP sent at 3/7/2025 12:37 PM EST -----  Please let pt know that her labs look stable.  Her BUN is elevated.  Increase clear fluids as able.  Repeat CMP and CBC in 6 months before follow up.  Follow up in office as planned. -WU

## 2025-03-07 NOTE — TELEPHONE ENCOUNTER
Patient notified of results. She states that she continues with the ongoing fatigue and would like to know if you have any further recommendations or testing that would help figure this out.   Please advise.   Previously discussed lab orders mailed to pt.

## 2025-03-10 NOTE — TELEPHONE ENCOUNTER
Noted.  I would recommend to continue to work with PT on strength and add as much activity at possible to help with energy.  A low fat, high fiber diet can help also.  Call office with any questions or concerns, or if symptoms are getting worse or changing. -WS

## 2025-03-17 RX ORDER — METOPROLOL TARTRATE 50 MG
50 TABLET ORAL 2 TIMES DAILY
Qty: 180 TABLET | Refills: 1 | Status: SHIPPED | OUTPATIENT
Start: 2025-03-17 | End: 2025-03-20 | Stop reason: SDUPTHER

## 2025-03-18 ENCOUNTER — TELEPHONE (OUTPATIENT)
Dept: FAMILY MEDICINE CLINIC | Age: 75
End: 2025-03-18

## 2025-03-18 RX ORDER — AMLODIPINE BESYLATE 2.5 MG/1
2.5 TABLET ORAL DAILY
COMMUNITY
Start: 2025-02-20 | End: 2025-03-20 | Stop reason: SDUPTHER

## 2025-03-18 NOTE — TELEPHONE ENCOUNTER
Pt called in stating that she is needing refills of her medications sent to [facility]. One of patient's daughters also called this afternoon regarding needing refills of medications. It was explained to both the patient and one of her daughters that Johanna does not prescribe any medications for the patient at this time, the only medications that were on the current medication list that were prescribed were from cardiology. Daughter and patient were notified that those medications should come from cardiology because they were managing those medications before. Pt and daughter also notified that a current detailed medication list needed to be brought to the office with medication names, dosages, and instructions so medication list can be updated since it was not updated at the time of the appointment with Johanna on 3/3/2025. Daughter stated that she would call her sister who usually handles the medications to either call the office or bring in the medication list. Pt reached out to cardiology and is scheduled for an appt on 3/20/2025.      Pt notified that whatever cardiology would not send a prescription in for once the medication list is updated with correct information we can send prescriptions into the pharmacy.

## 2025-03-18 NOTE — PROGRESS NOTES
Medication list needed fixed. Daughter unable to help reconcile medication list. Daughter notified they would have to either be able to go through a medication list to give medication names, dosages, and directions since medication list was not reconciled at most recent office visit on 3/3/2025. Daughter notified most of the medications she was listing off are not on the current medication list so I would need clarification and she let me know that she does not usually handle patient's medications that is her sister. Daughter also informed that WS has not prescribed anything for the patient other than a handicap letter and some of the things that were requested were last prescribed by the cardiologist, so she should reach out to their office as well.

## 2025-03-20 ENCOUNTER — OFFICE VISIT (OUTPATIENT)
Dept: CARDIOLOGY CLINIC | Age: 75
End: 2025-03-20
Payer: MEDICARE

## 2025-03-20 VITALS
BODY MASS INDEX: 46.05 KG/M2 | OXYGEN SATURATION: 92 % | HEIGHT: 65 IN | SYSTOLIC BLOOD PRESSURE: 130 MMHG | HEART RATE: 67 BPM | DIASTOLIC BLOOD PRESSURE: 68 MMHG | WEIGHT: 276.4 LBS

## 2025-03-20 DIAGNOSIS — I49.3 FREQUENT PVCS: Primary | ICD-10-CM

## 2025-03-20 DIAGNOSIS — I50.32 CHRONIC DIASTOLIC CHF (CONGESTIVE HEART FAILURE) (HCC): ICD-10-CM

## 2025-03-20 PROCEDURE — 1159F MED LIST DOCD IN RCRD: CPT | Performed by: STUDENT IN AN ORGANIZED HEALTH CARE EDUCATION/TRAINING PROGRAM

## 2025-03-20 PROCEDURE — 1036F TOBACCO NON-USER: CPT | Performed by: STUDENT IN AN ORGANIZED HEALTH CARE EDUCATION/TRAINING PROGRAM

## 2025-03-20 PROCEDURE — 3078F DIAST BP <80 MM HG: CPT | Performed by: STUDENT IN AN ORGANIZED HEALTH CARE EDUCATION/TRAINING PROGRAM

## 2025-03-20 PROCEDURE — G8427 DOCREV CUR MEDS BY ELIG CLIN: HCPCS | Performed by: STUDENT IN AN ORGANIZED HEALTH CARE EDUCATION/TRAINING PROGRAM

## 2025-03-20 PROCEDURE — 1090F PRES/ABSN URINE INCON ASSESS: CPT | Performed by: STUDENT IN AN ORGANIZED HEALTH CARE EDUCATION/TRAINING PROGRAM

## 2025-03-20 PROCEDURE — G8417 CALC BMI ABV UP PARAM F/U: HCPCS | Performed by: STUDENT IN AN ORGANIZED HEALTH CARE EDUCATION/TRAINING PROGRAM

## 2025-03-20 PROCEDURE — G8399 PT W/DXA RESULTS DOCUMENT: HCPCS | Performed by: STUDENT IN AN ORGANIZED HEALTH CARE EDUCATION/TRAINING PROGRAM

## 2025-03-20 PROCEDURE — 1123F ACP DISCUSS/DSCN MKR DOCD: CPT | Performed by: STUDENT IN AN ORGANIZED HEALTH CARE EDUCATION/TRAINING PROGRAM

## 2025-03-20 PROCEDURE — 3017F COLORECTAL CA SCREEN DOC REV: CPT | Performed by: STUDENT IN AN ORGANIZED HEALTH CARE EDUCATION/TRAINING PROGRAM

## 2025-03-20 PROCEDURE — 99214 OFFICE O/P EST MOD 30 MIN: CPT | Performed by: STUDENT IN AN ORGANIZED HEALTH CARE EDUCATION/TRAINING PROGRAM

## 2025-03-20 PROCEDURE — 3075F SYST BP GE 130 - 139MM HG: CPT | Performed by: STUDENT IN AN ORGANIZED HEALTH CARE EDUCATION/TRAINING PROGRAM

## 2025-03-20 RX ORDER — ASPIRIN 81 MG/1
81 TABLET ORAL DAILY
Qty: 30 TABLET | Refills: 3 | Status: SHIPPED | OUTPATIENT
Start: 2025-03-20 | End: 2025-03-21 | Stop reason: SDUPTHER

## 2025-03-20 RX ORDER — FUROSEMIDE 20 MG/1
20 TABLET ORAL 2 TIMES DAILY
Qty: 180 TABLET | Refills: 3 | Status: SHIPPED | OUTPATIENT
Start: 2025-03-20 | End: 2025-03-21 | Stop reason: SDUPTHER

## 2025-03-20 RX ORDER — RAMIPRIL 10 MG/1
10 CAPSULE ORAL DAILY
Qty: 90 CAPSULE | Refills: 3 | Status: SHIPPED | OUTPATIENT
Start: 2025-03-20 | End: 2025-03-21 | Stop reason: SDUPTHER

## 2025-03-20 RX ORDER — AMLODIPINE BESYLATE 2.5 MG/1
2.5 TABLET ORAL DAILY
Qty: 90 TABLET | Refills: 3 | Status: SHIPPED | OUTPATIENT
Start: 2025-03-20 | End: 2025-03-21 | Stop reason: SDUPTHER

## 2025-03-20 RX ORDER — CLOPIDOGREL BISULFATE 75 MG/1
75 TABLET ORAL DAILY
Qty: 90 TABLET | Refills: 3 | Status: SHIPPED | OUTPATIENT
Start: 2025-03-20 | End: 2025-03-21 | Stop reason: SDUPTHER

## 2025-03-20 RX ORDER — METOPROLOL TARTRATE 50 MG
50 TABLET ORAL 2 TIMES DAILY
Qty: 180 TABLET | Refills: 3 | Status: SHIPPED | OUTPATIENT
Start: 2025-03-20 | End: 2025-03-21 | Stop reason: SDUPTHER

## 2025-03-20 RX ORDER — HYDROCHLOROTHIAZIDE 25 MG/1
25 TABLET ORAL DAILY
Qty: 90 TABLET | Refills: 3 | Status: SHIPPED | OUTPATIENT
Start: 2025-03-20 | End: 2025-03-21 | Stop reason: SDUPTHER

## 2025-03-20 RX ORDER — ATORVASTATIN CALCIUM 40 MG/1
40 TABLET, FILM COATED ORAL DAILY
Qty: 90 TABLET | Refills: 3 | Status: SHIPPED | OUTPATIENT
Start: 2025-03-20 | End: 2025-03-21 | Stop reason: SDUPTHER

## 2025-03-20 NOTE — PROGRESS NOTES
Follow up.    Last EKG done on 06/24/2024.    Patient had CVA 12/2024.    Needs prescription refills.     Reports shortness of breath that is not new for her, and intermittent edema in her lower extremities.     Denies chest pain, palpitations, and dizziness.      
asypmtoamtic, recheck 24 hour holter before appt in 6 weeks. If continued high burden, consider amio. If not, will keep with current treatment.      Orders Placed:  No orders of the defined types were placed in this encounter.      Disposition:  24 hour holter before next appt.   F/u with DR Zacarias as scheduled or sooner if needed.    Discussed use, benefit, and side effects of prescribed medications. All patient questions answered. Pt voiced understanding. Instructed to continue current medications, diet and exercise. Continue risk factor modification and medical management. Patient agreed with treatment plan. Follow up as directed.    Electronically signedby Connor Mortimer, PA-C on 3/20/2025 at 3:08 PM

## 2025-03-20 NOTE — PATIENT INSTRUCTIONS
You may receive a survey regarding the care you received during your visit. We encourage you to complete and return your survey, as your input is valuable to us. We hope you will choose us in the future for your healthcare needs. Thank you!    Your Medical Assistant/Nurse today: Xenia Porter LPN  Thank you for coming to our office! It was a pleasure to serve you.

## 2025-03-20 NOTE — TELEPHONE ENCOUNTER
Medications filled by cardiology. If pt needing further refills she can call for what cardiology did not fill.

## 2025-03-21 RX ORDER — METOPROLOL TARTRATE 50 MG
50 TABLET ORAL 2 TIMES DAILY
Qty: 180 TABLET | Refills: 3 | Status: SHIPPED | OUTPATIENT
Start: 2025-03-21

## 2025-03-21 RX ORDER — AMLODIPINE BESYLATE 2.5 MG/1
2.5 TABLET ORAL DAILY
Qty: 90 TABLET | Refills: 3 | Status: SHIPPED | OUTPATIENT
Start: 2025-03-21

## 2025-03-21 RX ORDER — RAMIPRIL 10 MG/1
10 CAPSULE ORAL DAILY
Qty: 90 CAPSULE | Refills: 3 | Status: SHIPPED | OUTPATIENT
Start: 2025-03-21

## 2025-03-21 RX ORDER — HYDROCHLOROTHIAZIDE 25 MG/1
25 TABLET ORAL DAILY
Qty: 90 TABLET | Refills: 3 | Status: SHIPPED | OUTPATIENT
Start: 2025-03-21

## 2025-03-21 RX ORDER — ATORVASTATIN CALCIUM 40 MG/1
40 TABLET, FILM COATED ORAL DAILY
Qty: 90 TABLET | Refills: 3 | Status: SHIPPED | OUTPATIENT
Start: 2025-03-21

## 2025-03-21 RX ORDER — FUROSEMIDE 20 MG/1
20 TABLET ORAL 2 TIMES DAILY
Qty: 180 TABLET | Refills: 3 | Status: SHIPPED | OUTPATIENT
Start: 2025-03-21

## 2025-03-21 RX ORDER — ASPIRIN 81 MG/1
81 TABLET ORAL DAILY
Qty: 90 TABLET | Refills: 3 | Status: SHIPPED | OUTPATIENT
Start: 2025-03-21

## 2025-03-21 RX ORDER — CLOPIDOGREL BISULFATE 75 MG/1
75 TABLET ORAL DAILY
Qty: 90 TABLET | Refills: 3 | Status: SHIPPED | OUTPATIENT
Start: 2025-03-21

## 2025-03-21 NOTE — TELEPHONE ENCOUNTER
Pharmacy is calling and said that the pts meds were sent to the wrong [facility] yesterday and they need to be sent to this one.     Plavix, lasix, hydrochlorothiazide, aspirin 81mg, amlodipine, atorvastatin, ramipril & metoprolol    [facility] at [place] - [place], [place] - [address] [place] [address] Court - P [phone] - F [phone]

## 2025-04-10 ENCOUNTER — TELEPHONE (OUTPATIENT)
Dept: FAMILY MEDICINE CLINIC | Age: 75
End: 2025-04-10

## 2025-04-10 DIAGNOSIS — G81.94 HEMIPLEGIA AFFECTING LEFT NONDOMINANT SIDE, UNSPECIFIED ETIOLOGY, UNSPECIFIED HEMIPLEGIA TYPE (HCC): Primary | ICD-10-CM

## 2025-04-10 NOTE — TELEPHONE ENCOUNTER
Received a call from Navya at [facility] stating that she is going to discharge the pt from home PT and OT today, requesting an order for outpatient PT and OT to have done at [facility]. Please advise.     DX: I69.352 Hemiplegia following cerebral infarction, left dominant side    Navya PH: [phone]    OK to call pt with response. Do not need to call Navya back unless the orders aren't going to be written.

## 2025-04-14 NOTE — PROGRESS NOTES
[facility]  Therapy Contact Note      Date: 2025  Patient Name: Dara Sanz        MRN: [id number]    Account Number: [id number]  : 1950  (74 y.o.)  Gender: female         Set up [facility] for 25 1245. Left message for patient that  time is 1215.    Erika Fraser, Rehab Tech

## 2025-04-16 ENCOUNTER — TELEMEDICINE (OUTPATIENT)
Dept: FAMILY MEDICINE CLINIC | Age: 75
End: 2025-04-16

## 2025-04-16 DIAGNOSIS — I27.20 PULMONARY HYPERTENSION (HCC): ICD-10-CM

## 2025-04-16 DIAGNOSIS — E78.2 MIXED HYPERLIPIDEMIA: ICD-10-CM

## 2025-04-16 DIAGNOSIS — N39.3 STRESS INCONTINENCE OF URINE: Primary | ICD-10-CM

## 2025-04-16 DIAGNOSIS — E11.9 TYPE 2 DIABETES MELLITUS WITHOUT COMPLICATION, WITHOUT LONG-TERM CURRENT USE OF INSULIN: ICD-10-CM

## 2025-04-16 DIAGNOSIS — I10 PRIMARY HYPERTENSION: ICD-10-CM

## 2025-04-16 DIAGNOSIS — Z87.442 H/O RENAL CALCULI: ICD-10-CM

## 2025-04-16 RX ORDER — SOLIFENACIN SUCCINATE 5 MG/1
5 TABLET, FILM COATED ORAL DAILY
Qty: 30 TABLET | Refills: 3 | Status: SHIPPED | OUTPATIENT
Start: 2025-04-16 | End: 2026-04-16

## 2025-04-16 ASSESSMENT — ENCOUNTER SYMPTOMS
WHEEZING: 0
NAUSEA: 0
DIARRHEA: 0
BACK PAIN: 0
VOMITING: 0
SINUS PAIN: 0
FACIAL SWELLING: 0
SORE THROAT: 0
ABDOMINAL PAIN: 0
COLOR CHANGE: 0
COUGH: 0
SHORTNESS OF BREATH: 0
TROUBLE SWALLOWING: 0

## 2025-04-16 NOTE — PROGRESS NOTES
[facility]  [facility]  [address] [facility] [address]  Dept: [phone]  Dept Fax: [phone]  Loc: [phone]      Dara Sanz, was evaluated through a synchronous (real-time) audio-video encounter. The patient (or guardian if applicable) is aware that this is a billable service, which includes applicable co-pays. This Virtual Visit was conducted with patient's (and/or legal guardian's) consent. Patient identification was verified, and a caregiver was present when appropriate.   The patient was located at Home: [address] [place] [address]  Provider was located at Facility (Appt Dept): [address] [place] [address]  Confirm you are appropriately licensed, registered, or certified to deliver care in the state where the patient is located as indicated above. If you are not or unsure, please re-schedule the visit: Yes, I confirm.     Dara Sanz (:  1950) is a Established patient, presenting virtually for evaluation of the following:      Below is the assessment and plan developed based on review of pertinent history, physical exam, labs, studies, and medications.     Assessment & Plan  Stress incontinence of urine   New, not at goal (unstable), changes made today: add Vesicare and referral back to urology for evaluation and lifestyle modifications recommended    Orders:    Alvarez Dhaliwal Jr, MD, Urology, [place]    solifenacin (VESICARE) 5 MG tablet; Take 1 tablet by mouth daily    Primary hypertension   Chronic, at goal (stable), continue current treatment plan         Type 2 diabetes mellitus without complication, without long-term current use of insulin   Chronic, at goal (stable), continue current treatment plan         H/O renal calculi   Chronic, at goal (stable), continue current treatment plan    Orders:    Alvarez Dhaliwla Jr, MD, Urology, [place]    Pulmonary hypertension (HCC)   Chronic, at goal (stable), continue

## 2025-04-22 ENCOUNTER — HOSPITAL ENCOUNTER (OUTPATIENT)
Dept: OCCUPATIONAL THERAPY | Age: 75
Setting detail: THERAPIES SERIES
Discharge: HOME | End: 2025-04-22
Payer: MEDICARE

## 2025-04-22 ENCOUNTER — HOSPITAL ENCOUNTER (OUTPATIENT)
Dept: PHYSICAL THERAPY | Age: 75
Setting detail: THERAPIES SERIES
Discharge: HOME | End: 2025-04-22
Payer: MEDICARE

## 2025-04-22 PROCEDURE — 97166 OT EVAL MOD COMPLEX 45 MIN: CPT

## 2025-04-22 PROCEDURE — 97116 GAIT TRAINING THERAPY: CPT

## 2025-04-22 PROCEDURE — 97530 THERAPEUTIC ACTIVITIES: CPT

## 2025-04-22 PROCEDURE — 97112 NEUROMUSCULAR REEDUCATION: CPT

## 2025-04-22 PROCEDURE — 97161 PT EVAL LOW COMPLEX 20 MIN: CPT

## 2025-04-22 PROCEDURE — 97110 THERAPEUTIC EXERCISES: CPT

## 2025-04-22 NOTE — PROGRESS NOTES
[facility]  OCCUPATIONAL THERAPY  [x] EVALUATION  [] DAILY NOTE (LAND) [] DAILY NOTE (AQUATIC ) [] PROGRESS NOTE [] DISCHARGE NOTE    [x] OUTPATIENT REHABILITATION [facility]   [] [facility] [place]    [] [facility]   [] [facility]    Date: 2025  Patient Name:  Dara Sanz  : 1950  MRN: [id number]  CSN: [id number]    Referring Practitioner Johanna Oh, MARIA INES - CNP [id number]      Diagnosis  Diagnoses       G81.94 (ICD-10-CM) - Hemiplegia affecting left nondominant side, unspecified etiology, unspecified hemiplegia type (HCC)           Treatment Diagnosis M62.81 Generalized Muscle Weakness  G81.93 Hemiplegia, Unspecific Affecting Left Dominant Side   Date of Evaluation 25   Additional Pertinent History Dara Sanz has a past medical history of Anxiety, Arthritis, CAD (coronary artery disease), GERD (gastroesophageal reflux disease), History of kidney stones, Hyperlipidemia, Hypertension, Kidney stone, Osteoarthritis, and Sleep apnea.  she has a past surgical history that includes Colonoscopy (2012); Dilation and curettage of uterus (); Lithotripsy (2011); Foot surgery (Left, ); Cystoscopy (); Throat surgery (2014); Cardiac catheterization; Diagnostic Cardiac Cath Lab Procedure (); Incisional breast biopsy (Left, ); and Ureter surgery (Right, 2024).     Allergies Allergies   Allergen Reactions    Other      Other reaction(s): Other  Other reaction(s): Unknown    Sulfa Antibiotics     Trulicity [Dulaglutide]      Kidney issue and tiredness.    Dilaudid [Hydromorphone Hcl] Nausea And Vomiting    Toradol [Ketorolac Tromethamine] Other (See Comments)     Lethargic, felt \"weird\"    Tramadol Nausea And Vomiting and Other (See Comments)     Pt states this medication makes her Loopy      Medications   Current Outpatient Medications:     solifenacin (VESICARE) 5 MG tablet, Take 1 tablet by mouth daily, Disp:

## 2025-04-22 NOTE — PROGRESS NOTES
[facility]  PHYSICAL THERAPY  [x] EVALUATION  [] DAILY NOTE (LAND) [] DAILY NOTE (AQUATIC ) [] PROGRESS NOTE [] DISCHARGE NOTE    [x] OUTPATIENT REHABILITATION [facility]   [] [place] [facility] [place]    [] [facility]   [] [facility]    Date: 2025  Patient Name:  Dara Sanz  : 1950  MRN: [id number]  CSN: [id number]    Referring Practitioner Johanna Oh, MARIA INES - CNP [phone]      Diagnosis  Diagnoses       G81.94 (ICD-10-CM) - Hemiplegia affecting left nondominant side, unspecified etiology, unspecified hemiplegia type (HCC)           Treatment Diagnosis G81.90 Hemiplegia Affecting Unspecified Side  R26.81  Unsteadiness on feet  I69.30 Unspecified Sequelae of Cerebral Infarction  M62.81 Generalized Weakness  Z91.81 History of falling/At risk for falling   Date of Evaluation 25   Additional Pertinent History Dara Sanz has a past medical history of Anxiety, Arthritis, CAD (coronary artery disease), GERD (gastroesophageal reflux disease), History of kidney stones, Hyperlipidemia, Hypertension, Kidney stone, Osteoarthritis, and Sleep apnea.  she has a past surgical history that includes Colonoscopy (2012); Dilation and curettage of uterus (); Lithotripsy (2011); Foot surgery (Left, ); Cystoscopy (); Throat surgery (2014); Cardiac catheterization; Diagnostic Cardiac Cath Lab Procedure (); Incisional breast biopsy (Left, ); and Ureter surgery (Right, 2024).     Allergies Allergies   Allergen Reactions    Other      Other reaction(s): Other  Other reaction(s): Unknown    Sulfa Antibiotics     Trulicity [Dulaglutide]      Kidney issue and tiredness.    Dilaudid [Hydromorphone Hcl] Nausea And Vomiting    Toradol [Ketorolac Tromethamine] Other (See Comments)     Lethargic, felt \"weird\"    Tramadol Nausea And Vomiting and Other (See Comments)     Pt states this medication makes her Loopy      Medications

## 2025-04-23 ENCOUNTER — OFFICE VISIT (OUTPATIENT)
Dept: UROLOGY | Age: 75
End: 2025-04-23
Payer: MEDICARE

## 2025-04-23 VITALS — WEIGHT: 276 LBS | HEIGHT: 65 IN | BODY MASS INDEX: 45.98 KG/M2 | RESPIRATION RATE: 12 BRPM

## 2025-04-23 DIAGNOSIS — N20.0 KIDNEY STONES: ICD-10-CM

## 2025-04-23 DIAGNOSIS — N39.3 STRESS INCONTINENCE OF URINE: ICD-10-CM

## 2025-04-23 DIAGNOSIS — N39.46 URINARY INCONTINENCE, MIXED: Primary | ICD-10-CM

## 2025-04-23 DIAGNOSIS — N32.81 OAB (OVERACTIVE BLADDER): ICD-10-CM

## 2025-04-23 PROCEDURE — 3017F COLORECTAL CA SCREEN DOC REV: CPT

## 2025-04-23 PROCEDURE — 1036F TOBACCO NON-USER: CPT

## 2025-04-23 PROCEDURE — G8417 CALC BMI ABV UP PARAM F/U: HCPCS

## 2025-04-23 PROCEDURE — G8399 PT W/DXA RESULTS DOCUMENT: HCPCS

## 2025-04-23 PROCEDURE — 0509F URINE INCON PLAN DOCD: CPT

## 2025-04-23 PROCEDURE — G8427 DOCREV CUR MEDS BY ELIG CLIN: HCPCS

## 2025-04-23 PROCEDURE — 99214 OFFICE O/P EST MOD 30 MIN: CPT

## 2025-04-23 PROCEDURE — 1159F MED LIST DOCD IN RCRD: CPT

## 2025-04-23 PROCEDURE — 1090F PRES/ABSN URINE INCON ASSESS: CPT

## 2025-04-23 PROCEDURE — 1123F ACP DISCUSS/DSCN MKR DOCD: CPT

## 2025-04-23 RX ORDER — SOLIFENACIN SUCCINATE 5 MG/1
5 TABLET, FILM COATED ORAL DAILY
Qty: 90 TABLET | Refills: 1 | Status: SHIPPED | OUTPATIENT
Start: 2025-04-23 | End: 2026-04-23

## 2025-04-23 NOTE — PROGRESS NOTES
she does not drink alcohol and does not use drugs.    Subjective:   REVIEW OF SYSTEMS:  Constitutional: negative  Eyes: negative  Respiratory: negative  Cardiovascular: negative  Gastrointestinal: negative  Musculoskeletal: negative  Genitourinary: negative except for what is in HPI  Skin: negative   Neurological: negative  Hematological/Lymphatic: negative  Psychological: negative    Objective:     PE:   Vitals:    04/23/25 1257   Resp: 12   Weight: 125.2 kg (276 lb)   Height: 1.651 m (5' 5\")       Constitutional:       No acute distress, cooperative and answering questions appropriately.  Cardiovascular:        Normal rate and regular rhythm.  Pulmonary/Chest:       Normal respiratory rate and rhthym.  No use of accessory muscles.   Abdominal:        Soft. No tenderness. Bowel sounds present.  Neurological:        Alert and oriented to person, place, time, and situation.  Psychiatric:        Normal mood and affect.  Genitourinary:       Bladder non-tender and non-distended.          Recent BUN/Creatinine:  Lab Results   Component Value Date/Time    BUN 28 03/07/2025 10:10 AM    CREATININE 0.8 03/07/2025 10:10 AM          Assessment & Plan:   Kidney Stones  - S/P Cystoscopy, Right URS, Basket Retrieval of Stones, Right Ureteral Stent Placement. Pain resolved. Stent pulled.  - KUB in 1 year.  - Prevention discussed at last visit. Adhering to dietary restrictions as discussed.  - KUB due in August.    2.   Urinary Incontinence, Mixed  3.   OAB  - Stroke in December. Recently started on Lasix and HCTZ. Worsening incontinence, primarily urgency incontinence since starting on diuretics and experiencing stroke. Very bothersome.   - Now on Solifenacin 5 mg which is helping. Continue. Counseled on increasing to 10 mg if effect wanes. Recommend Kegel's for HALLE.    *Follow-up as scheduled for symptom check and KUB review.    Ricardo Cates PA-C  Urology

## 2025-04-30 ENCOUNTER — HOSPITAL ENCOUNTER (OUTPATIENT)
Dept: OCCUPATIONAL THERAPY | Age: 75
Setting detail: THERAPIES SERIES
Discharge: HOME | End: 2025-04-30
Payer: MEDICARE

## 2025-04-30 ENCOUNTER — HOSPITAL ENCOUNTER (OUTPATIENT)
Dept: PHYSICAL THERAPY | Age: 75
Setting detail: THERAPIES SERIES
Discharge: HOME | End: 2025-04-30
Payer: MEDICARE

## 2025-04-30 PROCEDURE — 97140 MANUAL THERAPY 1/> REGIONS: CPT

## 2025-04-30 PROCEDURE — 97110 THERAPEUTIC EXERCISES: CPT

## 2025-04-30 PROCEDURE — 97112 NEUROMUSCULAR REEDUCATION: CPT

## 2025-04-30 NOTE — PROGRESS NOTES
[facility]  Therapy Contact Note      Date: 2025  Patient Name: Dara Sanz        MRN: [id number]    Account Number: [id number]  : 1950  (74 y.o.)  Gender: female         Set up transportation through [facility] for the following dates:  25 1215  25 10:30  25 11:30  25 11:15  25 1:45  25 1:00  6/3/25 1:00  25 1:30     [employer] is transporting patient on the followin/15/25 1400  25 1415  25 1415    Patient notified and will be given a schedule by her therapist.    Erika Fraser, Rehab Tech   
improve BLE PROM to 0-80 deg hamstring SLR, and 0-100 deg hip flexion during piriformis stretch in order to squat and bend more fully during household tasks.    Patient will be IND with HEP in order to meet long term goals.       Long Term Goals:  Time Frame: 12 weeks    Patient will improve BLE strength to 4+/5 all directions SLR, and at least 4/5 left ankle (within available range) in order to raise leg higher during bath/shower and vehicle transfers.     Patient will improve score of Moran Balance Scale from baseline 32/56 to at least 42/56 in order to decrease risk of falling and improve her ability to stand unsupported during household tasks.     Patient will ambulate x250 ft with rolling walker, with NO verbal cues to attend to obstacles without rest break in order to improve endurance with community distances like shopping.    Patient will squat to lift 10 lb weight from floor to tabletop height with good body mechanics in order to perform household tasks like laundry and carrying groceries.        Patient Education:   [x]  HEP/Education Completed: Exercises, HEP- reviewed safety when performing at home  [employer] Access Code: [email or web address]   []  No new Education completed  []  Reviewed Prior HEP      [x]  Patient verbalized and/or demonstrated understanding of education provided.  []  Patient unable to verbalize and/or demonstrate understanding of education provided.  Will continue education.  []  Barriers to learning:     PLAN:  Treatment Recommendations: Strengthening, Range of Motion, Balance Training, Functional Mobility Training, Endurance Training, Gait Training, Stair Training, Neuromuscular Re-education, Manual Therapy - Soft Tissue Mobilization, Manual Therapy - Joint Manipulation, Pain Management, Home Exercise Program, Patient Education, Vestibular Rehabilitation, Aquatics, and Modalities    []  Plan of care initiated.  Plan to see patient 2 times per week for 12 weeks to address the treatment planned

## 2025-04-30 NOTE — PROGRESS NOTES
[facility]  OCCUPATIONAL THERAPY  [] EVALUATION  [x] DAILY NOTE (LAND) [] DAILY NOTE (AQUATIC ) [] PROGRESS NOTE [] DISCHARGE NOTE    [x] OUTPATIENT REHABILITATION [facility]   [] [place] [facility] [place]    [] [facility]   [] [facility]    Date: 2025  Patient Name:  Dara Sanz  : 1950  MRN: [id number]  CSN: [id number]    Referring Practitioner Johanna Oh, APRN - CNP [phone]      Diagnosis  Diagnoses       G81.94 (ICD-10-CM) - Hemiplegia affecting left nondominant side, unspecified etiology, unspecified hemiplegia type ([facility])           Treatment Diagnosis M62.81 Generalized Muscle Weakness  G81.93 Hemiplegia, Unspecific Affecting Left Dominant Side   Date of Evaluation 25   Additional Pertinent History Dara Sanz has a past medical history of Anxiety, Arthritis, CAD (coronary artery disease), GERD (gastroesophageal reflux disease), History of kidney stones, Hyperlipidemia, Hypertension, Kidney stone, Osteoarthritis, and Sleep apnea.  she has a past surgical history that includes Colonoscopy (2012); Dilation and curettage of uterus (); Lithotripsy (2011); Foot surgery (Left, ); Cystoscopy (); Throat surgery (2014); Cardiac catheterization; Diagnostic Cardiac Cath Lab Procedure (); Incisional breast biopsy (Left, ); and Ureter surgery (Right, 2024).     Allergies Allergies   Allergen Reactions    Other      Other reaction(s): Other  Other reaction(s): Unknown    Sulfa Antibiotics     Trulicity [Dulaglutide]      Kidney issue and tiredness.    Dilaudid [Hydromorphone Hcl] Nausea And Vomiting    Toradol [Ketorolac Tromethamine] Other (See Comments)     Lethargic, felt \"weird\"    Tramadol Nausea And Vomiting and Other (See Comments)     Pt states this medication makes her Loopy      Medications   Current Outpatient Medications:     solifenacin (VESICARE) 5 MG tablet, Take 1 tablet by mouth daily, Disp:

## 2025-05-05 ENCOUNTER — HOSPITAL ENCOUNTER (OUTPATIENT)
Dept: OCCUPATIONAL THERAPY | Age: 75
Setting detail: THERAPIES SERIES
Discharge: HOME | End: 2025-05-05
Payer: MEDICARE

## 2025-05-05 ENCOUNTER — HOSPITAL ENCOUNTER (OUTPATIENT)
Dept: PHYSICAL THERAPY | Age: 75
Setting detail: THERAPIES SERIES
Discharge: HOME | End: 2025-05-05
Payer: MEDICARE

## 2025-05-05 PROCEDURE — 97110 THERAPEUTIC EXERCISES: CPT

## 2025-05-05 PROCEDURE — 97530 THERAPEUTIC ACTIVITIES: CPT

## 2025-05-05 NOTE — PROGRESS NOTES
[facility]  PHYSICAL THERAPY  [] EVALUATION  [x] DAILY NOTE (LAND) [] DAILY NOTE (AQUATIC ) [] PROGRESS NOTE [] DISCHARGE NOTE    [x] OUTPATIENT REHABILITATION [facility]   [] [place] [facility]    [] [facility]   [] [facility]    Date: 2025  Patient Name:  Dara Sanz  : 1950  MRN: [id number]  CSN: [id number]    Referring Practitioner Johanna Oh, MARIA INES - CNP [phone]      Diagnosis  Diagnoses       G81.94 (ICD-10-CM) - Hemiplegia affecting left nondominant side, unspecified etiology, unspecified hemiplegia type (HCC)           Treatment Diagnosis G81.90 Hemiplegia Affecting Unspecified Side  R26.81  Unsteadiness on feet  I69.30 Unspecified Sequelae of Cerebral Infarction  M62.81 Generalized Weakness  Z91.81 History of falling/At risk for falling   Date of Evaluation 25   Additional Pertinent History Dara Sanz has a past medical history of Anxiety, Arthritis, CAD (coronary artery disease), GERD (gastroesophageal reflux disease), History of kidney stones, Hyperlipidemia, Hypertension, Kidney stone, Osteoarthritis, and Sleep apnea.  she has a past surgical history that includes Colonoscopy (2012); Dilation and curettage of uterus (); Lithotripsy (2011); Foot surgery (Left, ); Cystoscopy (); Throat surgery (2014); Cardiac catheterization; Diagnostic Cardiac Cath Lab Procedure (); Incisional breast biopsy (Left, ); and Ureter surgery (Right, 2024).     Allergies Allergies   Allergen Reactions    Other      Other reaction(s): Other  Other reaction(s): Unknown    Sulfa Antibiotics     Trulicity [Dulaglutide]      Kidney issue and tiredness.    Dilaudid [Hydromorphone Hcl] Nausea And Vomiting    Toradol [Ketorolac Tromethamine] Other (See Comments)     Lethargic, felt \"weird\"    Tramadol Nausea And Vomiting and Other (See Comments)     Pt states this medication makes her Loopy      Medications

## 2025-05-05 NOTE — PROGRESS NOTES
[facility]  OCCUPATIONAL THERAPY  [] EVALUATION  [x] DAILY NOTE (LAND) [] DAILY NOTE (AQUATIC ) [] PROGRESS NOTE [] DISCHARGE NOTE    [x] OUTPATIENT REHABILITATION [facility]   [] [facility] [place]    [] [facility]   [] [facility]    Date: 2025  Patient Name:  Dara Sanz  : 1950  MRN: [id number]  CSN: [id number]    Referring Practitioner Johanna Oh, MARIA INES - CNP [id number]      Diagnosis  Diagnoses       G81.94 (ICD-10-CM) - Hemiplegia affecting left nondominant side, unspecified etiology, unspecified hemiplegia type (HCC)           Treatment Diagnosis M62.81 Generalized Muscle Weakness  G81.93 Hemiplegia, Unspecific Affecting Left Dominant Side   Date of Evaluation 25   Additional Pertinent History Dara Sanz has a past medical history of Anxiety, Arthritis, CAD (coronary artery disease), GERD (gastroesophageal reflux disease), History of kidney stones, Hyperlipidemia, Hypertension, Kidney stone, Osteoarthritis, and Sleep apnea.  she has a past surgical history that includes Colonoscopy (2012); Dilation and curettage of uterus (); Lithotripsy (2011); Foot surgery (Left, ); Cystoscopy (); Throat surgery (2014); Cardiac catheterization; Diagnostic Cardiac Cath Lab Procedure (); Incisional breast biopsy (Left, ); and Ureter surgery (Right, 2024).     Allergies Allergies   Allergen Reactions    Other      Other reaction(s): Other  Other reaction(s): Unknown    Sulfa Antibiotics     Trulicity [Dulaglutide]      Kidney issue and tiredness.    Dilaudid [Hydromorphone Hcl] Nausea And Vomiting    Toradol [Ketorolac Tromethamine] Other (See Comments)     Lethargic, felt \"weird\"    Tramadol Nausea And Vomiting and Other (See Comments)     Pt states this medication makes her Loopy      Medications   Current Outpatient Medications:     solifenacin (VESICARE) 5 MG tablet, Take 1 tablet by mouth daily, Disp:

## 2025-05-06 ENCOUNTER — HOSPITAL ENCOUNTER (OUTPATIENT)
Age: 75
Discharge: HOME | End: 2025-05-08
Payer: MEDICARE

## 2025-05-06 DIAGNOSIS — I49.3 FREQUENT PVCS: ICD-10-CM

## 2025-05-06 DIAGNOSIS — I50.32 CHRONIC DIASTOLIC CHF (CONGESTIVE HEART FAILURE) (HCC): ICD-10-CM

## 2025-05-06 PROCEDURE — 93225 XTRNL ECG REC<48 HRS REC: CPT

## 2025-05-09 ENCOUNTER — HOSPITAL ENCOUNTER (OUTPATIENT)
Dept: OCCUPATIONAL THERAPY | Age: 75
Setting detail: THERAPIES SERIES
Discharge: HOME | End: 2025-05-09
Payer: MEDICARE

## 2025-05-09 ENCOUNTER — HOSPITAL ENCOUNTER (OUTPATIENT)
Dept: PHYSICAL THERAPY | Age: 75
Setting detail: THERAPIES SERIES
Discharge: HOME | End: 2025-05-09
Payer: MEDICARE

## 2025-05-09 PROCEDURE — 97112 NEUROMUSCULAR REEDUCATION: CPT

## 2025-05-09 PROCEDURE — 97535 SELF CARE MNGMENT TRAINING: CPT

## 2025-05-09 PROCEDURE — 97110 THERAPEUTIC EXERCISES: CPT

## 2025-05-09 NOTE — PROGRESS NOTES
Mat table: with wedge and 2 pillows        Piriformis stretch - leg crossed or therapist assistance       Mini bridge  2x6   Cues for even push through Les    Hooklying iso hip abd 10x3s                    Specific Interventions Next Treatment: NuStep warm up, Left leg strengthening and core strength on mat table, standing balance and gait training. Raising Left leg high enough during vehicle transfers and shower transfers. Patient does not have steps in her home, but ok to progress to 4 inch step for balance training. SOB and decreased endurance, monitor O2 as needed. Positional vertigo may interfere with tolerance for supine - offer to have PT screen for this?     Activity/Treatment Tolerance:  [x]  Patient tolerated treatment well  []  Patient limited by fatigue  []  Patient limited by pain   []  Patient limited by medical complications  []  Other:     Assessment: Nustep warm up. Advanced standing exercises as noted above and included step up exercise to progress strength and stability. Cuing throughout for posture and core involvement. Pt required 2 rest breaks due to fatigue and pain in L foot. Reviewed HEP with pt as she had videos of very advanced exercises saved on her phone and questioned wether she should try them at home. Discussed starting with exercise sheets she had been given in therapy (updated and reviewed these with pt as well.) Pt tolerated session well.       GOALS:  Patient Goal: improve safety with transfers into the car and shower, catches her foot often. Improve her balance and endurance/fatigue    Short Term Goals:  Time Frame: 6 weeks    Patient will report decreased Left leg pain to less than 2/10 during standing therapy session in order to stand and walk longer than 10 minutes during household tasks.    Patient will improve LLE AROM to 0-60 deg supine SLR, and sidelying hip 0-20 deg in order to raise leg higher during transfers into her vehicle.    Patient will improve BLE PROM to 0-80

## 2025-05-09 NOTE — PROGRESS NOTES
Flexion only today   Digit flexion and extension using towel  1 5     Supine dowel ok for chest press, shoulder flexion and horizontal abduction 1 10 ea  Loose right  on dowel ok           Activities Time    Notes/Comments   WB on table top   Wiping down table with cues to push throughout her left hand when completing    In standing, completed grasping Sabeo balls and placing in crate in front  X 10 balls back and forth in 2 crates; cues for thumb and little finger extension when placing balls in crate; pt fatigued at end of activity; R hand held crate to stabilize and for bilateral activity    Grasping and release of 1\" blocks with left hand, cues for using index and thumb   Cues to exaggerate extension motion of digits   Pegs ~1/4\" wood pegs in peg board  x Held peg board with R hand for bilateral activity; cue to use index and thumb    Towel scrunch and straighten  x Pt able to scrunch towel but had difficulty straightening it.  Able to straighten about 5 reps but then muscles fatigued and unable to straighten further.     Cutting with knife- putty   x Built up foam handle on knife.  Pt had difficulty applying pressure between thumb and side of index finger to have good control of knife.  Instructed pt to practice squeezing this at home and then apply downward pressure into putty for HEP.  Issued brown and red foam tubing.    Weight bearing on left hand with brown wedge under while reaching to the left with right UE      Grasp and release of soft football and bean bag with left hand   Discussed concentration on digit extension     Specific Interventions Next Treatment: weight bearing onto left UE, explore Seabo options with pt, e-stim/[place] stim, AROM of left UE, functional tasks of left UE, education on progression of recovery    Activity/Treatment Tolerance:  [x]  Patient tolerated treatment well  []  Patient limited by fatigue  []  Patient limited by pain   []  Patient limited by other medical

## 2025-05-10 LAB
ACQUISITION DURATION: NORMAL S
AVERAGE HEART RATE: 71 BPM
HOOKUP DATE: NORMAL
HOOKUP TIME: NORMAL
MAX HEART RATE TIME/DATE: NORMAL
MAX HEART RATE: 107 BPM
MIN HEART RATE TIME/DATE: NORMAL
MIN HEART RATE: 66 BPM
NUMBER OF QRS COMPLEXES: NORMAL
NUMBER OF SUPRAVENTRICULAR COUPLETS: 0
NUMBER OF SUPRAVENTRICULAR ECTOPICS: 13
NUMBER OF SUPRAVENTRICULAR ISOLATED BEATS: 5
NUMBER OF VENTRICULAR BIGEMINAL CYCLES: 102
NUMBER OF VENTRICULAR COUPLETS: 428
NUMBER OF VENTRICULAR ECTOPICS: NORMAL

## 2025-05-13 ENCOUNTER — HOSPITAL ENCOUNTER (OUTPATIENT)
Dept: OCCUPATIONAL THERAPY | Age: 75
Setting detail: THERAPIES SERIES
Discharge: HOME | End: 2025-05-13
Payer: MEDICARE

## 2025-05-13 ENCOUNTER — HOSPITAL ENCOUNTER (OUTPATIENT)
Dept: PHYSICAL THERAPY | Age: 75
Setting detail: THERAPIES SERIES
Discharge: HOME | End: 2025-05-13
Payer: MEDICARE

## 2025-05-13 PROCEDURE — 97110 THERAPEUTIC EXERCISES: CPT

## 2025-05-13 PROCEDURE — 97112 NEUROMUSCULAR REEDUCATION: CPT

## 2025-05-13 NOTE — PROGRESS NOTES
[facility]  OCCUPATIONAL THERAPY  [] EVALUATION  [x] DAILY NOTE (LAND) [] DAILY NOTE (AQUATIC ) [] PROGRESS NOTE [] DISCHARGE NOTE    [x] OUTPATIENT REHABILITATION [facility]   [] [place] [facility] [place]    [] [facility]   [] [facility]    Date: 2025  Patient Name:  Dara Sanz  : 1950  MRN: [id number]  CSN: [id number]    Referring Practitioner Johanna Oh, APRN - CNP [phone]      Diagnosis  Diagnoses       G81.94 (ICD-10-CM) - Hemiplegia affecting left nondominant side, unspecified etiology, unspecified hemiplegia type ([facility])           Treatment Diagnosis M62.81 Generalized Muscle Weakness  G81.93 Hemiplegia, Unspecific Affecting Left Dominant Side   Date of Evaluation 25   Additional Pertinent History Dara Sanz has a past medical history of Anxiety, Arthritis, CAD (coronary artery disease), GERD (gastroesophageal reflux disease), History of kidney stones, Hyperlipidemia, Hypertension, Kidney stone, Osteoarthritis, and Sleep apnea.  she has a past surgical history that includes Colonoscopy (2012); Dilation and curettage of uterus (); Lithotripsy (2011); Foot surgery (Left, ); Cystoscopy (); Throat surgery (2014); Cardiac catheterization; Diagnostic Cardiac Cath Lab Procedure (); Incisional breast biopsy (Left, ); and Ureter surgery (Right, 2024).     Allergies Allergies   Allergen Reactions    Other      Other reaction(s): Other  Other reaction(s): Unknown    Sulfa Antibiotics     Trulicity [Dulaglutide]      Kidney issue and tiredness.    Dilaudid [Hydromorphone Hcl] Nausea And Vomiting    Toradol [Ketorolac Tromethamine] Other (See Comments)     Lethargic, felt \"weird\"    Tramadol Nausea And Vomiting and Other (See Comments)     Pt states this medication makes her Loopy      Medications   Current Outpatient Medications:     solifenacin (VESICARE) 5 MG tablet, Take 1 tablet by mouth daily, Disp:

## 2025-05-13 NOTE — PROGRESS NOTES
stand and walk longer than 10 minutes during household tasks.    Patient will improve LLE AROM to 0-60 deg supine SLR, and sidelying hip 0-20 deg in order to raise leg higher during transfers into her vehicle.    Patient will improve BLE PROM to 0-80 deg hamstring SLR, and 0-100 deg hip flexion during piriformis stretch in order to squat and bend more fully during household tasks.    Patient will be IND with HEP in order to meet long term goals.       Long Term Goals:  Time Frame: 12 weeks    Patient will improve BLE strength to 4+/5 all directions SLR, and at least 4/5 left ankle (within available range) in order to raise leg higher during bath/shower and vehicle transfers.     Patient will improve score of Moran Balance Scale from baseline 32/56 to at least 42/56 in order to decrease risk of falling and improve her ability to stand unsupported during household tasks.     Patient will ambulate x250 ft with rolling walker, with NO verbal cues to attend to obstacles without rest break in order to improve endurance with community distances like shopping.    Patient will squat to lift 10 lb weight from floor to tabletop height with good body mechanics in order to perform household tasks like laundry and carrying groceries.        Patient Education:   [x]  HEP/Education Completed: HEP completion, exercise techniques, education on recovery process  [facility] [email or web address]   []  No new Education completed  []  Reviewed Prior HEP      [x]  Patient verbalized and/or demonstrated understanding of education provided.  []  Patient unable to verbalize and/or demonstrate understanding of education provided.  Will continue education.  []  Barriers to learning:     PLAN:  Treatment Recommendations: Strengthening, Range of Motion, Balance Training, Functional Mobility Training, Endurance Training, Gait Training, Stair Training, Neuromuscular Re-education, Manual Therapy - Soft Tissue Mobilization, Manual Therapy - Joint

## 2025-05-14 ENCOUNTER — OFFICE VISIT (OUTPATIENT)
Dept: CARDIOLOGY CLINIC | Age: 75
End: 2025-05-14
Payer: MEDICARE

## 2025-05-14 VITALS
DIASTOLIC BLOOD PRESSURE: 66 MMHG | WEIGHT: 286.2 LBS | BODY MASS INDEX: 47.68 KG/M2 | HEIGHT: 65 IN | SYSTOLIC BLOOD PRESSURE: 118 MMHG | HEART RATE: 78 BPM

## 2025-05-14 DIAGNOSIS — I50.32 CHRONIC HEART FAILURE WITH PRESERVED EJECTION FRACTION (HCC): ICD-10-CM

## 2025-05-14 DIAGNOSIS — I10 PRIMARY HYPERTENSION: ICD-10-CM

## 2025-05-14 DIAGNOSIS — E78.00 PURE HYPERCHOLESTEROLEMIA: ICD-10-CM

## 2025-05-14 DIAGNOSIS — I49.3 PVC'S (PREMATURE VENTRICULAR CONTRACTIONS): Primary | ICD-10-CM

## 2025-05-14 PROCEDURE — 99214 OFFICE O/P EST MOD 30 MIN: CPT | Performed by: PHYSICIAN ASSISTANT

## 2025-05-14 PROCEDURE — 3017F COLORECTAL CA SCREEN DOC REV: CPT | Performed by: PHYSICIAN ASSISTANT

## 2025-05-14 PROCEDURE — G8427 DOCREV CUR MEDS BY ELIG CLIN: HCPCS | Performed by: PHYSICIAN ASSISTANT

## 2025-05-14 PROCEDURE — 3078F DIAST BP <80 MM HG: CPT | Performed by: PHYSICIAN ASSISTANT

## 2025-05-14 PROCEDURE — 1123F ACP DISCUSS/DSCN MKR DOCD: CPT | Performed by: PHYSICIAN ASSISTANT

## 2025-05-14 PROCEDURE — 3074F SYST BP LT 130 MM HG: CPT | Performed by: PHYSICIAN ASSISTANT

## 2025-05-14 PROCEDURE — 1159F MED LIST DOCD IN RCRD: CPT | Performed by: PHYSICIAN ASSISTANT

## 2025-05-14 PROCEDURE — 1036F TOBACCO NON-USER: CPT | Performed by: PHYSICIAN ASSISTANT

## 2025-05-14 PROCEDURE — G8417 CALC BMI ABV UP PARAM F/U: HCPCS | Performed by: PHYSICIAN ASSISTANT

## 2025-05-14 PROCEDURE — G8399 PT W/DXA RESULTS DOCUMENT: HCPCS | Performed by: PHYSICIAN ASSISTANT

## 2025-05-14 PROCEDURE — 1090F PRES/ABSN URINE INCON ASSESS: CPT | Performed by: PHYSICIAN ASSISTANT

## 2025-05-14 RX ORDER — FUROSEMIDE 20 MG/1
20 TABLET ORAL DAILY
Qty: 180 TABLET | Refills: 3 | Status: SHIPPED
Start: 2025-05-14

## 2025-05-14 NOTE — PROGRESS NOTES
pulses      Past Medical History:   Diagnosis Date    Anxiety     Arthritis     CAD (coronary artery disease)     Cerebral artery occlusion with cerebral infarction (HCC)     GERD (gastroesophageal reflux disease)     History of kidney stones     Hyperlipidemia     Hypertension     Kidney stone 2011    Osteoarthritis     Sleep apnea        Allergies   Allergen Reactions    Other/Food      Other reaction(s): Other  Other reaction(s): Unknown    Sulfa Antibiotics     Trulicity [Dulaglutide]      Kidney issue and tiredness.    Dilaudid [Hydromorphone Hcl] Nausea And Vomiting    Toradol [Ketorolac Tromethamine] Other (See Comments)     Lethargic, felt \"weird\"    Tramadol Nausea And Vomiting and Other (See Comments)     Pt states this medication makes her Loopy       Current Outpatient Medications   Medication Sig Dispense Refill    furosemide (LASIX) 20 MG tablet Take 1 tablet by mouth daily 180 tablet 3    solifenacin (VESICARE) 5 MG tablet Take 1 tablet by mouth daily 90 tablet 1    ramipril (ALTACE) 10 MG capsule Take 1 capsule by mouth daily 90 capsule 3    metoprolol tartrate (LOPRESSOR) 50 MG tablet Take 1 tablet by mouth 2 times daily 180 tablet 3    clopidogrel (PLAVIX) 75 MG tablet Take 1 tablet by mouth daily 90 tablet 3    atorvastatin (LIPITOR) 40 MG tablet Take 1 tablet by mouth daily 90 tablet 3    aspirin 81 MG EC tablet Take 1 tablet by mouth daily 90 tablet 3    amLODIPine (NORVASC) 2.5 MG tablet Take 1 tablet by mouth daily 90 tablet 3    Handicap Placard MISC by Does not apply route Valid for 10 years 1 each 0    acetaminophen (AMINOFEN) 325 MG tablet Take 2 tablets by mouth every 6 hours as needed for Pain      Omega 3 1000 MG CAPS Take by mouth 2 tablets daily      MAGNESIUM CITRATE PO Take by mouth 400 mg daily      Ascorbic Acid (VITAMIN C) 1000 MG tablet Take 1 tablet by mouth daily      Zinc Sulfate (ZINC-220 PO) Take by mouth daily One tablet - unsure of dosage      Multiple Vitamins-Minerals

## 2025-05-15 ENCOUNTER — HOSPITAL ENCOUNTER (OUTPATIENT)
Dept: OCCUPATIONAL THERAPY | Age: 75
Setting detail: THERAPIES SERIES
Discharge: HOME | End: 2025-05-15
Payer: MEDICARE

## 2025-05-15 ENCOUNTER — HOSPITAL ENCOUNTER (OUTPATIENT)
Dept: PHYSICAL THERAPY | Age: 75
Setting detail: THERAPIES SERIES
Discharge: HOME | End: 2025-05-15
Payer: MEDICARE

## 2025-05-15 PROCEDURE — 97110 THERAPEUTIC EXERCISES: CPT

## 2025-05-15 PROCEDURE — 97112 NEUROMUSCULAR REEDUCATION: CPT

## 2025-05-15 NOTE — PROGRESS NOTES
therapist assistant.  Benefit will not cover maintenance or preventative treatment.  AQUATIC THERAPY COVERED:   Yes  MODALITIES COVERED:  Yes with the exception of iontophoresis and Hot/Cold Packs     REFERENCE #: NA   Initial CPT Codes Requested 97110-Therapeutic Exercise,  97140-Manual Therapy, 97530-Therapeutic Activities, 93001-MZ ADL Training, and 64995-Uieljvgfrzzma Re-Education   Progress Note Counter 5/10 for progress note (Reporting Period: 4/22/25 to     )   Recertification Date 07/15/25   Physician Follow-Up None at that is time    Physician Orders Eval and treat   History of Present Illness Pt sustained a CVA in 12/24 presenting to [facility] against her wishes. Pt participated in inpatient rehab there until 1/9/25 where she went to SNF for further rehab. Pt discharged to home on 2/22/25 with home health PT an OT. Pt presented to outpatient therapy on 4/22/25.      SUBJECTIVE:  Patient states when she tried to do something this week, her left hand closes up more.  She states her index and middle finger are still numb.  She states she can hold her knife with the foam handle but not able to put a lot of pressure to cut.       OBJECTIVE:    TREATMENT   Precautions:left sharmila, fall     Pain: left hand 3/10     “X” in shaded column indicates Activity Completed Today   Modalities Parameters/  Location  Notes/Comments                     Manual Therapy Time/  Technique  Notes/Comments                     Exercises   Sets/  Sec Reps  Notes/Comments   Supine PROM to left shoulder all planes, elbow flexion/extension, supination/pronation, wrist and digits    Completed in sitting   Active thumb extension, ramos and radial abduction  1 10 X    Digit AAROM in flexion/extension with towel on tabletop  1 5     Scapular pinches 1 10  Warm up    Table top slides  shoulder flex  V shape  Circles fwd/bwd   3 sec 10 X Completed with R hand over L, reports good stretch   Digit flexion and extension using towel  1 5     Supine dowel

## 2025-05-15 NOTE — PROGRESS NOTES
[facility]  PHYSICAL THERAPY  [] EVALUATION  [x] DAILY NOTE (LAND) [] DAILY NOTE (AQUATIC ) [] PROGRESS NOTE [] DISCHARGE NOTE    [x] OUTPATIENT REHABILITATION [facility]   [] [place] [facility] [place]    [] [facility]   [] [facility]    Date: 5/15/2025  Patient Name:  Dara Sanz  : 1950  MRN: [id number]  CSN: [id number]    Referring Practitioner Johanna Oh, APRN - CNP [phone]      Diagnosis  Diagnoses       G81.94 (ICD-10-CM) - Hemiplegia affecting left nondominant side, unspecified etiology, unspecified hemiplegia type ([facility])           Treatment Diagnosis G81.90 Hemiplegia Affecting Unspecified Side  R26.81  Unsteadiness on feet  I69.30 Unspecified Sequelae of Cerebral Infarction  M62.81 Generalized Weakness  Z91.81 History of falling/At risk for falling   Date of Evaluation 25   Additional Pertinent History Dara Sanz has a past medical history of Anxiety, Arthritis, CAD (coronary artery disease), Cerebral artery occlusion with cerebral infarction (HCC), GERD (gastroesophageal reflux disease), History of kidney stones, Hyperlipidemia, Hypertension, Kidney stone, Osteoarthritis, and Sleep apnea.  she has a past surgical history that includes Colonoscopy (2012); Dilation and curettage of uterus (); Lithotripsy (2011); Foot surgery (Left, ); Cystoscopy (); Throat surgery (2014); Cardiac catheterization; Diagnostic Cardiac Cath Lab Procedure (); Incisional breast biopsy (Left, ); and Ureter surgery (Right, 2024).     Allergies Allergies   Allergen Reactions    Other/Food      Other reaction(s): Other  Other reaction(s): Unknown    Sulfa Antibiotics     Trulicity [Dulaglutide]      Kidney issue and tiredness.    Dilaudid [Hydromorphone Hcl] Nausea And Vomiting    Toradol [Ketorolac Tromethamine] Other (See Comments)     Lethargic, felt \"weird\"    Tramadol Nausea And Vomiting and Other (See Comments)     Pt

## 2025-05-20 ENCOUNTER — HOSPITAL ENCOUNTER (OUTPATIENT)
Dept: PHYSICAL THERAPY | Age: 75
Setting detail: THERAPIES SERIES
Discharge: HOME | End: 2025-05-20
Payer: MEDICARE

## 2025-05-20 ENCOUNTER — HOSPITAL ENCOUNTER (OUTPATIENT)
Dept: OCCUPATIONAL THERAPY | Age: 75
Setting detail: THERAPIES SERIES
Discharge: HOME | End: 2025-05-20
Payer: MEDICARE

## 2025-05-20 PROCEDURE — 97112 NEUROMUSCULAR REEDUCATION: CPT

## 2025-05-20 PROCEDURE — 97110 THERAPEUTIC EXERCISES: CPT

## 2025-05-20 PROCEDURE — 97530 THERAPEUTIC ACTIVITIES: CPT

## 2025-05-20 NOTE — PROGRESS NOTES
pressure into putty for HEP.  Issued brown and red foam tubing.    Weight bearing on left hand with brown wedge under while reaching to the left with right UE      Reaching with foam balls   Pt instructed to grab ball from therapist with L hand, pass over to R hand and place into bin to work on bilateral coordination, reaching, and grasp with relase. Done on both sides /hands. Pt dropped x3 balls. Graded up to correlate pronation/supination motions without dropping the ball.    Grasp and release of cones at table- wash cloth added for increased ease sliding forward and back.  X Initiated this session- Max decreasing to min Kashia at L digits 2-3 for proper grasp and release. High repetitions completed for additional re-ed/    [place] putty. Pt pulling apart putty using key pinch, then rolling with left hand and trying to push into the putty with digits  X To help with pushing down utensils to cut food, difficulty with pushing into putty   Adaptive equipment for kitchen   Shown patient one handed cutting board and where to purchase as well as rocker knives.  Patient is not yet able to apply a lot of pressure to cut with knife.  Able to  better using built up handle.  Also shown patient where to find over the door pulley for home use.     Grasp and release of soft football and bean bag with left hand   Discussed concentration on digit extension     Specific Interventions Next Treatment: weight bearing onto left UE, explore [place]abo options with pt, e-stim/[place] stim, AROM of left UE, functional tasks of left UE, education on progression of recovery    Activity/Treatment Tolerance:  [x]  Patient tolerated treatment well  []  Patient limited by fatigue  []  Patient limited by pain   []  Patient limited by other medical complications  []  Other:     Assessment: Patient is progressing towards her goals. She continues to verbalize frustration with increased flexor tone and overall slow functional return of her L hand and

## 2025-05-20 NOTE — PROGRESS NOTES
[facility]  PHYSICAL THERAPY  [] EVALUATION  [x] DAILY NOTE (LAND) [] DAILY NOTE (AQUATIC ) [] PROGRESS NOTE [] DISCHARGE NOTE    [x] OUTPATIENT REHABILITATION [facility]   [] [place] [facility] [place]    [] [facility]   [] [facility]    Date: 2025  Patient Name:  Dara Sanz  : 1950  MRN: [id number]  CSN: [id number]    Referring Practitioner Johanna Oh, MARIA INES - CNP [phone]      Diagnosis  Diagnoses       G81.94 (ICD-10-CM) - Hemiplegia affecting left nondominant side, unspecified etiology, unspecified hemiplegia type (HCC)           Treatment Diagnosis G81.90 Hemiplegia Affecting Unspecified Side  R26.81  Unsteadiness on feet  I69.30 Unspecified Sequelae of Cerebral Infarction  M62.81 Generalized Weakness  Z91.81 History of falling/At risk for falling   Date of Evaluation 25   Additional Pertinent History Dara Sanz has a past medical history of Anxiety, Arthritis, CAD (coronary artery disease), Cerebral artery occlusion with cerebral infarction (HCC), GERD (gastroesophageal reflux disease), History of kidney stones, Hyperlipidemia, Hypertension, Kidney stone, Osteoarthritis, and Sleep apnea.  she has a past surgical history that includes Colonoscopy (2012); Dilation and curettage of uterus (); Lithotripsy (2011); Foot surgery (Left, ); Cystoscopy (); Throat surgery (2014); Cardiac catheterization; Diagnostic Cardiac Cath Lab Procedure (); Incisional breast biopsy (Left, ); and Ureter surgery (Right, 2024).     Allergies Allergies   Allergen Reactions    Other/Food      Other reaction(s): Other  Other reaction(s): Unknown    Sulfa Antibiotics     Trulicity [Dulaglutide]      Kidney issue and tiredness.    Dilaudid [Hydromorphone Hcl] Nausea And Vomiting    Toradol [Ketorolac Tromethamine] Other (See Comments)     Lethargic, felt \"weird\"    Tramadol Nausea And Vomiting and Other (See Comments)     Pt

## 2025-05-22 ENCOUNTER — HOSPITAL ENCOUNTER (OUTPATIENT)
Dept: OCCUPATIONAL THERAPY | Age: 75
Setting detail: THERAPIES SERIES
Discharge: HOME | End: 2025-05-22
Payer: MEDICARE

## 2025-05-22 ENCOUNTER — HOSPITAL ENCOUNTER (OUTPATIENT)
Dept: PHYSICAL THERAPY | Age: 75
Setting detail: THERAPIES SERIES
Discharge: HOME | End: 2025-05-22
Payer: MEDICARE

## 2025-05-22 PROCEDURE — 97110 THERAPEUTIC EXERCISES: CPT

## 2025-05-22 PROCEDURE — 97112 NEUROMUSCULAR REEDUCATION: CPT

## 2025-05-22 NOTE — PROGRESS NOTES
[facility]  PHYSICAL THERAPY  [] EVALUATION  [x] DAILY NOTE (LAND) [] DAILY NOTE (AQUATIC ) [] PROGRESS NOTE [] DISCHARGE NOTE    [x] OUTPATIENT REHABILITATION [facility]   [] [place] [facility] [place]    [] [facility]   [] [facility]    Date: 2025  Patient Name:  Dara Sanz  : 1950  MRN: [id number]  CSN: [id number]    Referring Practitioner Johanna Oh, MARIA INES - CNP [phone]      Diagnosis  Diagnoses       G81.94 (ICD-10-CM) - Hemiplegia affecting left nondominant side, unspecified etiology, unspecified hemiplegia type (HCC)           Treatment Diagnosis G81.90 Hemiplegia Affecting Unspecified Side  R26.81  Unsteadiness on feet  I69.30 Unspecified Sequelae of Cerebral Infarction  M62.81 Generalized Weakness  Z91.81 History of falling/At risk for falling   Date of Evaluation 25   Additional Pertinent History Dara Sanz has a past medical history of Anxiety, Arthritis, CAD (coronary artery disease), Cerebral artery occlusion with cerebral infarction (HCC), GERD (gastroesophageal reflux disease), History of kidney stones, Hyperlipidemia, Hypertension, Kidney stone, Osteoarthritis, and Sleep apnea.  she has a past surgical history that includes Colonoscopy (2012); Dilation and curettage of uterus (); Lithotripsy (2011); Foot surgery (Left, ); Cystoscopy (); Throat surgery (2014); Cardiac catheterization; Diagnostic Cardiac Cath Lab Procedure (); Incisional breast biopsy (Left, ); and Ureter surgery (Right, 2024).     Allergies Allergies   Allergen Reactions    Other/Food      Other reaction(s): Other  Other reaction(s): Unknown    Sulfa Antibiotics     Trulicity [Dulaglutide]      Kidney issue and tiredness.    Dilaudid [Hydromorphone Hcl] Nausea And Vomiting    Toradol [Ketorolac Tromethamine] Other (See Comments)     Lethargic, felt \"weird\"    Tramadol Nausea And Vomiting and Other (See Comments)     Pt

## 2025-05-22 NOTE — PROGRESS NOTES
[facility]  OCCUPATIONAL THERAPY  [] EVALUATION  [x] DAILY NOTE (LAND) [] DAILY NOTE (AQUATIC ) [] PROGRESS NOTE [] DISCHARGE NOTE    [x] OUTPATIENT REHABILITATION CENTER [facility]   [] [place] [facility] [place]    [] [facility]   [] [facility]    Date: 2025  Patient Name:  Dara Sanz  : 1950  MRN: [id number]  CSN: [id number]    Referring Practitioner Johanna Oh, MARIA INES - CNP [phone]      Diagnosis  Diagnoses       G81.94 (ICD-10-CM) - Hemiplegia affecting left nondominant side, unspecified etiology, unspecified hemiplegia type ([facility])           Treatment Diagnosis M62.81 Generalized Muscle Weakness  G81.93 Hemiplegia, Unspecific Affecting Left Dominant Side   Date of Evaluation 25   Additional Pertinent History Dara Sanz has a past medical history of Anxiety, Arthritis, CAD (coronary artery disease), Cerebral artery occlusion with cerebral infarction (HCC), GERD (gastroesophageal reflux disease), History of kidney stones, Hyperlipidemia, Hypertension, Kidney stone, Osteoarthritis, and Sleep apnea.  she has a past surgical history that includes Colonoscopy (2012); Dilation and curettage of uterus (); Lithotripsy (2011); Foot surgery (Left, ); Cystoscopy (); Throat surgery (2014); Cardiac catheterization; Diagnostic Cardiac Cath Lab Procedure (); Incisional breast biopsy (Left, ); and Ureter surgery (Right, 2024).     Allergies Allergies   Allergen Reactions    Other/Food      Other reaction(s): Other  Other reaction(s): Unknown    Sulfa Antibiotics     Trulicity [Dulaglutide]      Kidney issue and tiredness.    Dilaudid [Hydromorphone Hcl] Nausea And Vomiting    Toradol [Ketorolac Tromethamine] Other (See Comments)     Lethargic, felt \"weird\"    Tramadol Nausea And Vomiting and Other (See Comments)     Pt states this medication makes her Loopy      Medications   Current Outpatient Medications:

## 2025-05-27 ENCOUNTER — HOSPITAL ENCOUNTER (OUTPATIENT)
Dept: OCCUPATIONAL THERAPY | Age: 75
Setting detail: THERAPIES SERIES
Discharge: HOME | End: 2025-05-27
Payer: MEDICARE

## 2025-05-27 ENCOUNTER — HOSPITAL ENCOUNTER (OUTPATIENT)
Dept: PHYSICAL THERAPY | Age: 75
Setting detail: THERAPIES SERIES
Discharge: HOME | End: 2025-05-27
Payer: MEDICARE

## 2025-05-27 PROCEDURE — 97112 NEUROMUSCULAR REEDUCATION: CPT

## 2025-05-27 PROCEDURE — 97116 GAIT TRAINING THERAPY: CPT

## 2025-05-27 PROCEDURE — 97110 THERAPEUTIC EXERCISES: CPT

## 2025-05-27 NOTE — PROGRESS NOTES
[facility]  PHYSICAL THERAPY  [] EVALUATION  [] DAILY NOTE (LAND) [] DAILY NOTE (AQUATIC ) [x] PROGRESS NOTE [] DISCHARGE NOTE    [x] OUTPATIENT REHABILITATION [facility]   [] [place] AM[facility]ULATORY CARE [place]    [] [facility]   [] [facility]    Date: 2025  Patient Name:  Dara Sanz  : 1950  MRN: [id number]  CSN: [id number]    Referring Practitioner Johanna Oh, APRN - CNP [phone]      Diagnosis  Diagnoses       G81.94 (ICD-10-CM) - Hemiplegia affecting left nondominant side, unspecified etiology, unspecified hemiplegia type (HCC)           Treatment Diagnosis G81.90 Hemiplegia Affecting Unspecified Side  R26.81  Unsteadiness on feet  I69.30 Unspecified Sequelae of Cerebral Infarction  M62.81 Generalized Weakness  Z91.81 History of falling/At risk for falling   Date of Evaluation 25   Additional Pertinent History Dara Sanz has a past medical history of Anxiety, Arthritis, CAD (coronary artery disease), Cerebral artery occlusion with cerebral infarction (HCC), GERD (gastroesophageal reflux disease), History of kidney stones, Hyperlipidemia, Hypertension, Kidney stone, Osteoarthritis, and Sleep apnea.  she has a past surgical history that includes Colonoscopy (2012); Dilation and curettage of uterus (); Lithotripsy (2011); Foot surgery (Left, ); Cystoscopy (); Throat surgery (2014); Cardiac catheterization; Diagnostic Cardiac Cath Lab Procedure (); Incisional breast biopsy (Left, ); and Ureter surgery (Right, 2024).     Allergies Allergies   Allergen Reactions    Other/Food      Other reaction(s): Other  Other reaction(s): Unknown    Sulfa Antibiotics     Trulicity [Dulaglutide]      Kidney issue and tiredness.    Dilaudid [Hydromorphone Hcl] Nausea And Vomiting    Toradol [Ketorolac Tromethamine] Other (See Comments)     Lethargic, felt \"weird\"    Tramadol Nausea And Vomiting and Other (See Comments)     Pt

## 2025-05-29 ENCOUNTER — HOSPITAL ENCOUNTER (OUTPATIENT)
Dept: PHYSICAL THERAPY | Age: 75
Setting detail: THERAPIES SERIES
Discharge: HOME | End: 2025-05-29
Payer: MEDICARE

## 2025-05-29 ENCOUNTER — HOSPITAL ENCOUNTER (OUTPATIENT)
Dept: OCCUPATIONAL THERAPY | Age: 75
Setting detail: THERAPIES SERIES
Discharge: HOME | End: 2025-05-29
Payer: MEDICARE

## 2025-05-29 PROCEDURE — 97110 THERAPEUTIC EXERCISES: CPT

## 2025-05-29 NOTE — PROGRESS NOTES
states this medication makes her Loopy      Medications   Current Outpatient Medications:     furosemide (LASIX) 20 MG tablet, Take 1 tablet by mouth daily, Disp: 180 tablet, Rfl: 3    solifenacin (VESICARE) 5 MG tablet, Take 1 tablet by mouth daily, Disp: 90 tablet, Rfl: 1    ramipril (ALTACE) 10 MG capsule, Take 1 capsule by mouth daily, Disp: 90 capsule, Rfl: 3    metoprolol tartrate (LOPRESSOR) 50 MG tablet, Take 1 tablet by mouth 2 times daily, Disp: 180 tablet, Rfl: 3    clopidogrel (PLAVIX) 75 MG tablet, Take 1 tablet by mouth daily, Disp: 90 tablet, Rfl: 3    atorvastatin (LIPITOR) 40 MG tablet, Take 1 tablet by mouth daily, Disp: 90 tablet, Rfl: 3    aspirin 81 MG EC tablet, Take 1 tablet by mouth daily, Disp: 90 tablet, Rfl: 3    amLODIPine (NORVASC) 2.5 MG tablet, Take 1 tablet by mouth daily, Disp: 90 tablet, Rfl: 3    Handicap Placard MISC, by Does not apply route Valid for 10 years, Disp: 1 each, Rfl: 0    acetaminophen (AMINOFEN) 325 MG tablet, Take 2 tablets by mouth every 6 hours as needed for Pain, Disp: , Rfl:     Omega 3 1000 MG CAPS, Take by mouth 2 tablets daily, Disp: , Rfl:     MAGNESIUM CITRATE PO, Take by mouth 400 mg daily, Disp: , Rfl:     Ascorbic Acid (VITAMIN C) 1000 MG tablet, Take 1 tablet by mouth daily, Disp: , Rfl:     Zinc Sulfate (ZINC-220 PO), Take by mouth daily One tablet - unsure of dosage, Disp: , Rfl:     Multiple Vitamins-Minerals (MULTIVITAMIN ADULT EXTRA C PO), , Disp: , Rfl:     Cholecalciferol (VITAMIN D3) 50 MCG (2000 UT) TABS, Take by mouth daily, Disp: , Rfl:     Black Elderberry (SAMBUCUS ELDERBERRY PO), Take 1,250 mg by mouth, Disp: , Rfl:     NONFORMULARY, Take 1 capsule by mouth daily [facility]  - Carotenoid 10mg 1xday, Disp: , Rfl:       Functional Outcome Measure Used Moran Balance Scale    Functional Outcome Score 32/56 (4/22/25)   40/56 (5/27/25)       Insurance: Primary: Payor: MEDICARE /  /  / ,   Secondary: AETNA   Authorization Information INSURANCE

## 2025-05-29 NOTE — PROGRESS NOTES
[facility]  OCCUPATIONAL THERAPY  [] EVALUATION  [] DAILY NOTE (LAND) [] DAILY NOTE (AQUATIC ) [x] PROGRESS NOTE [] DISCHARGE NOTE    [x] OUTPATIENT REHABILITATION CENTER [facility]   [] [place] AM[facility]ULATORY CARE [place]    [] [facility]   [] [facility]    Date: 2025  Patient Name:  Dara Sanz  : 1950  MRN: [id number]  CSN: [id number]    Referring Practitioner Johanna Oh, APRN - CNP [phone]      Diagnosis  Diagnoses       G81.94 (ICD-10-CM) - Hemiplegia affecting left nondominant side, unspecified etiology, unspecified hemiplegia type ([facility])           Treatment Diagnosis M62.81 Generalized Muscle Weakness  G81.93 Hemiplegia, Unspecific Affecting Left Dominant Side   Date of Evaluation 25   Additional Pertinent History Dara Sanz has a past medical history of Anxiety, Arthritis, CAD (coronary artery disease), Cerebral artery occlusion with cerebral infarction (HCC), GERD (gastroesophageal reflux disease), History of kidney stones, Hyperlipidemia, Hypertension, Kidney stone, Osteoarthritis, and Sleep apnea.  she has a past surgical history that includes Colonoscopy (2012); Dilation and curettage of uterus (); Lithotripsy (2011); Foot surgery (Left, ); Cystoscopy (); Throat surgery (2014); Cardiac catheterization; Diagnostic Cardiac Cath Lab Procedure (); Incisional breast biopsy (Left, ); and Ureter surgery (Right, 2024).     Allergies Allergies   Allergen Reactions    Other/Food      Other reaction(s): Other  Other reaction(s): Unknown    Sulfa Antibiotics     Trulicity [Dulaglutide]      Kidney issue and tiredness.    Dilaudid [Hydromorphone Hcl] Nausea And Vomiting    Toradol [Ketorolac Tromethamine] Other (See Comments)     Lethargic, felt \"weird\"    Tramadol Nausea And Vomiting and Other (See Comments)     Pt states this medication makes her Loopy      Medications   Current Outpatient Medications:

## 2025-06-03 ENCOUNTER — HOSPITAL ENCOUNTER (OUTPATIENT)
Dept: OCCUPATIONAL THERAPY | Age: 75
Setting detail: THERAPIES SERIES
Discharge: HOME | End: 2025-06-03
Payer: MEDICARE

## 2025-06-03 ENCOUNTER — HOSPITAL ENCOUNTER (OUTPATIENT)
Dept: PHYSICAL THERAPY | Age: 75
Setting detail: THERAPIES SERIES
Discharge: HOME | End: 2025-06-03
Payer: MEDICARE

## 2025-06-03 PROCEDURE — 97112 NEUROMUSCULAR REEDUCATION: CPT

## 2025-06-03 PROCEDURE — 97110 THERAPEUTIC EXERCISES: CPT

## 2025-06-03 NOTE — PROGRESS NOTES
from table  1 10 x    Digit AAROM in flexion/extension with towel on tabletop  1 5     Scapular pinches 1 10  Warm up    Table top slides  shoulder flex  V shape  Circles fwd/bwd   3 sec 10     Digit flexion and extension using towel  1 5     Supine dowel ok for chest press, shoulder flexion and horizontal abduction 1 10 ea  Loose right  on dowel ok    Digit abduction/adduction AROM  1 10     Sitting dowel ok for chest press and for shoulder flexion - loose  on left hand 1 10 ea     Lateral pinch yellow clothespin 1 5  Issued for home   AROM \"digit tapping\" on table   Emphasis on MP flexion/extension 1 10 x Pt unable to extend L2/3 from table top for MP ext AROM but able to lift L1, 4,5   Prayer Stretch 1; 3 sec 10  Cues for proper tech and breathing throughout.   Red therabar   Holding end supination/pronation  Palms down  twists 1 10 ea  Able to hold bar with left hand with moderate difficulty.   Wrist and digit extension against edge of table  5 sec 10  ^used yellow power web for added resistance this date   Activities Time    Notes/Comments   Goal assessment for PN   See below  Left  6#   Digit flexion and extension with grasping/release   Initiated use of [place] stim to facilitate digit extension of left hand to increase ease of releasing of objects with use of cones. Pt with noted better digit extension throughout releasing cones into a stack requiring her to complete shoulder flexion as well.    WB on table top   Completed weight bearing through ball onto table top- educated pt on this tech to help decrease her tone throughout her left hand   Wbing on tabletop with ball doing fwd/back , side to side with R over L hand    15 reps each way. Reports challenge going side to side is harder   In standing, completed grasping Sabeo balls and placing in crate in front   10 balls back and forth in 2 crates; cues for thumb and little finger extension when placing balls in crate; pt fatigued at end of

## 2025-06-03 NOTE — PROGRESS NOTES
[facility]  PHYSICAL THERAPY  [] EVALUATION  [x] DAILY NOTE (LAND) [] DAILY NOTE (AQUATIC ) [] PROGRESS NOTE [] DISCHARGE NOTE    [x] OUTPATIENT REHABILITATION [facility]   [] [place] [facility]ULATORY CARE [place]    [] [facility]   [] [facility]    Date: 6/3/2025  Patient Name:  Dara Sanz  : 1950  MRN: [id number]  CSN: [id number]    Referring Practitioner Johanna Oh, MARIA INES - CNP [phone]      Diagnosis  Diagnoses       G81.94 (ICD-10-CM) - Hemiplegia affecting left nondominant side, unspecified etiology, unspecified hemiplegia type (HCC)           Treatment Diagnosis G81.90 Hemiplegia Affecting Unspecified Side  R26.81  Unsteadiness on feet  I69.30 Unspecified Sequelae of Cerebral Infarction  M62.81 Generalized Weakness  Z91.81 History of falling/At risk for falling   Date of Evaluation 25   Additional Pertinent History Dara Sanz has a past medical history of Anxiety, Arthritis, CAD (coronary artery disease), Cerebral artery occlusion with cerebral infarction (HCC), GERD (gastroesophageal reflux disease), History of kidney stones, Hyperlipidemia, Hypertension, Kidney stone, Osteoarthritis, and Sleep apnea.  she has a past surgical history that includes Colonoscopy (2012); Dilation and curettage of uterus (); Lithotripsy (2011); Foot surgery (Left, ); Cystoscopy (); Throat surgery (2014); Cardiac catheterization; Diagnostic Cardiac Cath Lab Procedure (); Incisional breast biopsy (Left, ); and Ureter surgery (Right, 2024).     Allergies Allergies   Allergen Reactions    Other/Food      Other reaction(s): Other  Other reaction(s): Unknown    Sulfa Antibiotics     Trulicity [Dulaglutide]      Kidney issue and tiredness.    Dilaudid [Hydromorphone Hcl] Nausea And Vomiting    Toradol [Ketorolac Tromethamine] Other (See Comments)     Lethargic, felt \"weird\"    Tramadol Nausea And Vomiting and Other (See Comments)     Pt

## 2025-06-05 ENCOUNTER — HOSPITAL ENCOUNTER (OUTPATIENT)
Dept: OCCUPATIONAL THERAPY | Age: 75
Setting detail: THERAPIES SERIES
Discharge: HOME | End: 2025-06-05
Payer: MEDICARE

## 2025-06-05 ENCOUNTER — HOSPITAL ENCOUNTER (OUTPATIENT)
Dept: PHYSICAL THERAPY | Age: 75
Setting detail: THERAPIES SERIES
Discharge: HOME | End: 2025-06-05
Payer: MEDICARE

## 2025-06-05 DIAGNOSIS — N39.3 STRESS INCONTINENCE OF URINE: ICD-10-CM

## 2025-06-05 PROCEDURE — 97110 THERAPEUTIC EXERCISES: CPT

## 2025-06-05 PROCEDURE — 97112 NEUROMUSCULAR REEDUCATION: CPT

## 2025-06-05 RX ORDER — SOLIFENACIN SUCCINATE 5 MG/1
5 TABLET, FILM COATED ORAL DAILY
Qty: 90 TABLET | Refills: 1 | Status: CANCELLED | OUTPATIENT
Start: 2025-06-05 | End: 2026-06-05

## 2025-06-05 RX ORDER — SOLIFENACIN SUCCINATE 10 MG/1
10 TABLET, FILM COATED ORAL DAILY
Qty: 90 TABLET | Refills: 3 | Status: SHIPPED | OUTPATIENT
Start: 2025-06-05

## 2025-06-05 NOTE — TELEPHONE ENCOUNTER
Dara A Rolston called requesting a refill on the following medications:  Requested Prescriptions     Pending Prescriptions Disp Refills    solifenacin (VESICARE) 5 MG tablet 90 tablet 1     Sig: Take 1 tablet by mouth daily     Pharmacy verified:    [facility] [address] [place] [address] Court - P [phone] - F [phone]      Date of last visit: 04/23/2025  Date of next visit (if applicable): 8/12/2025      Pt would like to increase to 10mg, states that she was told she can at her last appt.

## 2025-06-05 NOTE — PROGRESS NOTES
[facility]  OCCUPATIONAL THERAPY  [] EVALUATION  [x] DAILY NOTE (LAND) [] DAILY NOTE (AQUATIC ) [] PROGRESS NOTE [] DISCHARGE NOTE    [x] OUTPATIENT REHABILITATION CENTER [facility]   [] [place] AMBULATORY CARE [place]    [] [facility]   [] [facility]    Date: 2025  Patient Name:  Dara Sanz  : 1950  MRN: [id number]  CSN: [id number]    Referring Practitioner Johanna Oh, APRN - CNP [phone]      Diagnosis  Diagnoses       G81.94 (ICD-10-CM) - Hemiplegia affecting left nondominant side, unspecified etiology, unspecified hemiplegia type ([facility])           Treatment Diagnosis M62.81 Generalized Muscle Weakness  G81.93 Hemiplegia, Unspecific Affecting Left Dominant Side   Date of Evaluation 25   Additional Pertinent History Dara Sanz has a past medical history of Anxiety, Arthritis, CAD (coronary artery disease), Cerebral artery occlusion with cerebral infarction (HCC), GERD (gastroesophageal reflux disease), History of kidney stones, Hyperlipidemia, Hypertension, Kidney stone, Osteoarthritis, and Sleep apnea.  she has a past surgical history that includes Colonoscopy (2012); Dilation and curettage of uterus (); Lithotripsy (2011); Foot surgery (Left, ); Cystoscopy (); Throat surgery (2014); Cardiac catheterization; Diagnostic Cardiac Cath Lab Procedure (); Incisional breast biopsy (Left, ); and Ureter surgery (Right, 2024).     Allergies Allergies   Allergen Reactions    Other/Food      Other reaction(s): Other  Other reaction(s): Unknown    Sulfa Antibiotics     Trulicity [Dulaglutide]      Kidney issue and tiredness.    Dilaudid [Hydromorphone Hcl] Nausea And Vomiting    Toradol [Ketorolac Tromethamine] Other (See Comments)     Lethargic, felt \"weird\"    Tramadol Nausea And Vomiting and Other (See Comments)     Pt states this medication makes her Loopy      Medications   Current Outpatient Medications:

## 2025-06-05 NOTE — PROGRESS NOTES
[facility]  PHYSICAL THERAPY  [] EVALUATION  [x] DAILY NOTE (LAND) [] DAILY NOTE (AQUATIC ) [] PROGRESS NOTE [] DISCHARGE NOTE    [x] OUTPATIENT REHABILITATION [facility]   [] [place] AMBULATORY CARE [place]    [] [facility]   [] [facility]    Date: 2025  Patient Name:  Dara Sanz  : 1950  MRN: [id number]  CSN: [id number]    Referring Practitioner Johanna Oh, APRN - CNP [phone]      Diagnosis  Diagnoses       G81.94 (ICD-10-CM) - Hemiplegia affecting left nondominant side, unspecified etiology, unspecified hemiplegia type (HCC)           Treatment Diagnosis G81.90 Hemiplegia Affecting Unspecified Side  R26.81  Unsteadiness on feet  I69.30 Unspecified Sequelae of Cerebral Infarction  M62.81 Generalized Weakness  Z91.81 History of falling/At risk for falling   Date of Evaluation 25   Additional Pertinent History Dara Sanz has a past medical history of Anxiety, Arthritis, CAD (coronary artery disease), Cerebral artery occlusion with cerebral infarction (HCC), GERD (gastroesophageal reflux disease), History of kidney stones, Hyperlipidemia, Hypertension, Kidney stone, Osteoarthritis, and Sleep apnea.  she has a past surgical history that includes Colonoscopy (2012); Dilation and curettage of uterus (); Lithotripsy (2011); Foot surgery (Left, ); Cystoscopy (); Throat surgery (2014); Cardiac catheterization; Diagnostic Cardiac Cath Lab Procedure (); Incisional breast biopsy (Left, ); and Ureter surgery (Right, 2024).     Allergies Allergies   Allergen Reactions    Other/Food      Other reaction(s): Other  Other reaction(s): Unknown    Sulfa Antibiotics     Trulicity [Dulaglutide]      Kidney issue and tiredness.    Dilaudid [Hydromorphone Hcl] Nausea And Vomiting    Toradol [Ketorolac Tromethamine] Other (See Comments)     Lethargic, felt \"weird\"    Tramadol Nausea And Vomiting and Other (See Comments)     Pt

## 2025-06-12 ENCOUNTER — HOSPITAL ENCOUNTER (OUTPATIENT)
Dept: PHYSICAL THERAPY | Age: 75
Setting detail: THERAPIES SERIES
Discharge: HOME | End: 2025-06-12
Payer: MEDICARE

## 2025-06-12 ENCOUNTER — HOSPITAL ENCOUNTER (OUTPATIENT)
Dept: OCCUPATIONAL THERAPY | Age: 75
Setting detail: THERAPIES SERIES
Discharge: HOME | End: 2025-06-12
Payer: MEDICARE

## 2025-06-12 PROCEDURE — 97112 NEUROMUSCULAR REEDUCATION: CPT

## 2025-06-12 PROCEDURE — 97110 THERAPEUTIC EXERCISES: CPT

## 2025-06-12 NOTE — PROGRESS NOTES
strength  Prognosis: fair    GOALS:  Patient Goal: get use of her left hand so she can return sewing, cut food up, grasp onto objects    Short Term Goals:  Time Frame: 5 weeks   Pt to increase her strength of left UE to allow her to hold her arm at 90 degrees of flexion against gravity in prep for returning dishes into cupboard.   5/29/25 GOAL MET Pt to be able to hold her left UE at 90 degrees of flexion for at least 15 sec. She is unable to maintain the position against any resistance though due to decreased strength     REVISED GOAL: Pt to increase her left UE shoulder strength to be able to hold against light resistance at 90 degrees of flexion to increase ease of placing dishes in cupboard    Pt to be able to educate on compensatory tech/adaptive equipment to use at home to increase ease of completing her simple meal prep and other ADL tasks   5/29/25 GOAL NOT MET/CONTINUE     Pt to be able to grasp onto a object between 1-2 inches and place to onto counter with mod assistance to release in prep for holding onto a cup.    5/29/25 GOAL MET Pt able to grasp onto cones as well as 1 inch block this date in more of a raking motion. She has difficulty releasing object due to her tone increasing when attempting   REVISED GOAL Pt to be able to grasp onto cup or plate to place on lower shelf of upper cupboard    Pt to be able to increase coordination of left hand to allow her to oppose thumb to L2 to complete a tip pinch.    5/29/25 GOAL MET Pt able to oppose thumb to L2    REVISED GOAL Pt to increase FMC to be able to  a peg from 9 hole peg test with min difficulty     5. NEW GOAL: Pt to complete light homemaking tasks using her left UE as a stabilizer such as folding laundry, cooking (stirring), sweeping, etc with min difficulty  6. NEW GOAL: Pt to increase her gross grasp strength of left hand > 10# for ease of gripping onto her walker    Long Term Goals:  Time Frame: 12 weeks   Pt to be able to cook a simple

## 2025-06-12 NOTE — PROGRESS NOTES
THERAPY BENEFIT:  No visit limit. Treatment must be medically necessary and must required the skill of a licensed therapist or therapist assistant.  Benefit will not cover maintenance or preventative treatment.  AQUATIC THERAPY COVERED:   Yes  MODALITIES COVERED:  Yes with the exception of iontophoresis and Hot/Cold Packs   Initial CPT Codes Requested 84286 - Therapeutic Exercise, 17228 - Manual Therapy , 47142 - Aquatic Therapeutic Exercise, 70562 - Neuromuscular Re-Education , 10135 - Gait Training, 43769 - Therapeutic Activities, 30204 - Canalith Repositioning Procedure, and 12374 - Manual Electrical Stimulation   Progress Note Counter 13, 4/10 for progress note (Reporting Period: 5/27/25 to 6/27/25  )   Recertification Date 07/16/25   Physician Follow-Up Patient unsure if she follows with neurologist. PCP following, urology consulted, cardio consulted and 48 hr monitor planned for May.    Physician Orders    History of Present Illness December 12 - noticed left side of her body going numb, went immediately to ER but not brought back in a timely manner and she was not given the shot because verbally reported she has blood in her urine. Patient was upset about her care in the ER and the slow care she received. Supportive listening provided, but given re-direction to her current goals and deficits.    Admitted to hospital and IP rehab through January 9th - significant left hemiparesis and needing a platform rolling walker for the left arm, losing balance toward the left.    Discharged to the [facility]. Discharged home February 22nd.   Home health PT/OT twice per week. Home nursing once per week. Her rehab staff were trying to get her to use a sharmila-walker, but she strongly preferred a walker. Discharged from home health mid-April.    Since being home, patient struggles to use her Left hand and has difficulty cutting food with a knife. Patient feels that she is struggling with her endurance and becoming fatigued,

## 2025-06-17 ENCOUNTER — HOSPITAL ENCOUNTER (OUTPATIENT)
Dept: OCCUPATIONAL THERAPY | Age: 75
Setting detail: THERAPIES SERIES
Discharge: HOME | End: 2025-06-17
Payer: MEDICARE

## 2025-06-17 ENCOUNTER — HOSPITAL ENCOUNTER (OUTPATIENT)
Dept: PHYSICAL THERAPY | Age: 75
Setting detail: THERAPIES SERIES
Discharge: HOME | End: 2025-06-17
Payer: MEDICARE

## 2025-06-17 PROCEDURE — 97112 NEUROMUSCULAR REEDUCATION: CPT

## 2025-06-17 PROCEDURE — 97110 THERAPEUTIC EXERCISES: CPT

## 2025-06-17 NOTE — PROGRESS NOTES
[facility]  PHYSICAL THERAPY  [] EVALUATION  [x] DAILY NOTE (LAND) [] DAILY NOTE (AQUATIC ) [] PROGRESS NOTE [] DISCHARGE NOTE    [x] OUTPATIENT REHABILITATION [facility]   [] [place] AMBULATORY CARE [place]    [] [facility]   [] [facility]    Date: 2025  Patient Name:  Dara Sanz  : 1950  MRN: [id number]  CSN: [id number]    Referring Practitioner Johanna Oh, APRN - CNP [phone]      Diagnosis  Diagnoses       G81.94 (ICD-10-CM) - Hemiplegia affecting left nondominant side, unspecified etiology, unspecified hemiplegia type ([facility])           Treatment Diagnosis G81.90 Hemiplegia Affecting Unspecified Side  R26.81  Unsteadiness on feet  I69.30 Unspecified Sequelae of Cerebral Infarction  M62.81 Generalized Weakness  Z91.81 History of falling/At risk for falling   Date of Evaluation 25   Additional Pertinent History Dara Sanz has a past medical history of Anxiety, Arthritis, CAD (coronary artery disease), Cerebral artery occlusion with cerebral infarction (HCC), GERD (gastroesophageal reflux disease), History of kidney stones, Hyperlipidemia, Hypertension, Kidney stone, Osteoarthritis, and Sleep apnea.  she has a past surgical history that includes Colonoscopy (2012); Dilation and curettage of uterus (); Lithotripsy (2011); Foot surgery (Left, ); Cystoscopy (); Throat surgery (2014); Cardiac catheterization; Diagnostic Cardiac Cath Lab Procedure (); Incisional breast biopsy (Left, ); and Ureter surgery (Right, 2024).     Allergies Allergies   Allergen Reactions    Other      Other reaction(s): Other  Other reaction(s): Unknown    Sulfa Antibiotics     Trulicity [Dulaglutide]      Kidney issue and tiredness.    Dilaudid [Hydromorphone Hcl] Nausea And Vomiting    Toradol [Ketorolac Tromethamine] Other (See Comments)     Lethargic, felt \"weird\"    Tramadol Nausea And Vomiting and Other (See Comments)     Pt

## 2025-06-17 NOTE — PROGRESS NOTES
[facility]  OCCUPATIONAL THERAPY  [] EVALUATION  [x] DAILY NOTE (LAND) [] DAILY NOTE (AQUATIC ) [] PROGRESS NOTE [] DISCHARGE NOTE    [x] OUTPATIENT REHABILITATION CENTER [facility]   [] [place] AMBULATORY CARE [place]    [] [facility]   [] [facility]    Date: 2025  Patient Name:  Dara Sanz  : 1950  MRN: [id number]  CSN: [id number]    Referring Practitioner Johanna Oh, MARIA INES - CNP [phone]      Diagnosis  Diagnoses       G81.94 (ICD-10-CM) - Hemiplegia affecting left nondominant side, unspecified etiology, unspecified hemiplegia type ([facility])           Treatment Diagnosis M62.81 Generalized Muscle Weakness  G81.93 Hemiplegia, Unspecific Affecting Left Dominant Side   Date of Evaluation 25   Additional Pertinent History Dara Sanz has a past medical history of Anxiety, Arthritis, CAD (coronary artery disease), Cerebral artery occlusion with cerebral infarction (HCC), GERD (gastroesophageal reflux disease), History of kidney stones, Hyperlipidemia, Hypertension, Kidney stone, Osteoarthritis, and Sleep apnea.  she has a past surgical history that includes Colonoscopy (2012); Dilation and curettage of uterus (); Lithotripsy (2011); Foot surgery (Left, ); Cystoscopy (); Throat surgery (2014); Cardiac catheterization; Diagnostic Cardiac Cath Lab Procedure (); Incisional breast biopsy (Left, ); and Ureter surgery (Right, 2024).     Allergies Allergies   Allergen Reactions    Other      Other reaction(s): Other  Other reaction(s): Unknown    Sulfa Antibiotics     Trulicity [Dulaglutide]      Kidney issue and tiredness.    Dilaudid [Hydromorphone Hcl] Nausea And Vomiting    Toradol [Ketorolac Tromethamine] Other (See Comments)     Lethargic, felt \"weird\"    Tramadol Nausea And Vomiting and Other (See Comments)     Pt states this medication makes her Loopy      Medications   Current Outpatient Medications:     solifenacin

## 2025-06-19 ENCOUNTER — HOSPITAL ENCOUNTER (OUTPATIENT)
Dept: PHYSICAL THERAPY | Age: 75
Setting detail: THERAPIES SERIES
End: 2025-06-19
Payer: MEDICARE

## 2025-06-19 ENCOUNTER — APPOINTMENT (OUTPATIENT)
Dept: OCCUPATIONAL THERAPY | Age: 75
End: 2025-06-19
Payer: MEDICARE

## 2025-06-24 ENCOUNTER — HOSPITAL ENCOUNTER (OUTPATIENT)
Dept: OCCUPATIONAL THERAPY | Age: 75
Setting detail: THERAPIES SERIES
Discharge: HOME | End: 2025-06-24
Payer: MEDICARE

## 2025-06-24 ENCOUNTER — HOSPITAL ENCOUNTER (OUTPATIENT)
Dept: PHYSICAL THERAPY | Age: 75
Setting detail: THERAPIES SERIES
Discharge: HOME | End: 2025-06-24
Payer: MEDICARE

## 2025-06-24 PROCEDURE — 97110 THERAPEUTIC EXERCISES: CPT

## 2025-06-24 PROCEDURE — 97530 THERAPEUTIC ACTIVITIES: CPT

## 2025-06-24 NOTE — PROGRESS NOTES
[facility]  PHYSICAL THERAPY  [] EVALUATION  [x] DAILY NOTE (LAND) [] DAILY NOTE (AQUATIC ) [] PROGRESS NOTE [] DISCHARGE NOTE    [x] OUTPATIENT REHABILITATION [facility]   [] [place] [facility]    [] [facility]   [] [facility]    Date: 2025  Patient Name:  Dara Sanz  : 1950  MRN: [id number]  CSN: [id number]    Referring Practitioner Johanna Oh, MARIA INES - CNP [phone]      Diagnosis  Diagnoses       G81.94 (ICD-10-CM) - Hemiplegia affecting left nondominant side, unspecified etiology, unspecified hemiplegia type (HCC)           Treatment Diagnosis G81.90 Hemiplegia Affecting Unspecified Side  R26.81  Unsteadiness on feet  I69.30 Unspecified Sequelae of Cerebral Infarction  M62.81 Generalized Weakness  Z91.81 History of falling/At risk for falling   Date of Evaluation 25   Additional Pertinent History Dara Sanz has a past medical history of Anxiety, Arthritis, CAD (coronary artery disease), Cerebral artery occlusion with cerebral infarction (HCC), GERD (gastroesophageal reflux disease), History of kidney stones, Hyperlipidemia, Hypertension, Kidney stone, Osteoarthritis, and Sleep apnea.  she has a past surgical history that includes Colonoscopy (2012); Dilation and curettage of uterus (); Lithotripsy (2011); Foot surgery (Left, ); Cystoscopy (); Throat surgery (2014); Cardiac catheterization; Diagnostic Cardiac Cath Lab Procedure (); Incisional breast biopsy (Left, ); and Ureter surgery (Right, 2024).     Allergies Allergies   Allergen Reactions    Other      Other reaction(s): Other  Other reaction(s): Unknown    Sulfa Antibiotics     Trulicity [Dulaglutide]      Kidney issue and tiredness.    Dilaudid [Hydromorphone Hcl] Nausea And Vomiting    Toradol [Ketorolac Tromethamine] Other (See Comments)     Lethargic, felt \"weird\"    Tramadol Nausea And Vomiting and Other (See Comments)     Pt

## 2025-06-24 NOTE — PROGRESS NOTES
[facility]  OCCUPATIONAL THERAPY  [] EVALUATION  [x] DAILY NOTE (LAND) [] DAILY NOTE (AQUATIC ) [] PROGRESS NOTE [] DISCHARGE NOTE    [x] OUTPATIENT REHABILITATION CENTER [facility]   [] [place] [facility]    [] [facility]   [] [facility]    Date: 2025  Patient Name:  Dara Sanz  : 1950  MRN: [id number]  CSN: [id number]    Referring Practitioner Johanna Oh, MARIA INES - CNP [phone]      Diagnosis  Diagnoses       G81.94 (ICD-10-CM) - Hemiplegia affecting left nondominant side, unspecified etiology, unspecified hemiplegia type ([facility])           Treatment Diagnosis M62.81 Generalized Muscle Weakness  G81.93 Hemiplegia, Unspecific Affecting Left Dominant Side   Date of Evaluation 25   Additional Pertinent History Dara Sanz has a past medical history of Anxiety, Arthritis, CAD (coronary artery disease), Cerebral artery occlusion with cerebral infarction (HCC), GERD (gastroesophageal reflux disease), History of kidney stones, Hyperlipidemia, Hypertension, Kidney stone, Osteoarthritis, and Sleep apnea.  she has a past surgical history that includes Colonoscopy (2012); Dilation and curettage of uterus (); Lithotripsy (2011); Foot surgery (Left, ); Cystoscopy (); Throat surgery (2014); Cardiac catheterization; Diagnostic Cardiac Cath Lab Procedure (); Incisional breast biopsy (Left, ); and Ureter surgery (Right, 2024).     Allergies Allergies   Allergen Reactions    Other      Other reaction(s): Other  Other reaction(s): Unknown    Sulfa Antibiotics     Trulicity [Dulaglutide]      Kidney issue and tiredness.    Dilaudid [Hydromorphone Hcl] Nausea And Vomiting    Toradol [Ketorolac Tromethamine] Other (See Comments)     Lethargic, felt \"weird\"    Tramadol Nausea And Vomiting and Other (See Comments)     Pt states this medication makes her Loopy      Medications   Current Outpatient Medications:     solifenacin

## 2025-06-26 ENCOUNTER — HOSPITAL ENCOUNTER (OUTPATIENT)
Dept: OCCUPATIONAL THERAPY | Age: 75
Setting detail: THERAPIES SERIES
Discharge: HOME | End: 2025-06-26
Payer: MEDICARE

## 2025-06-26 ENCOUNTER — HOSPITAL ENCOUNTER (OUTPATIENT)
Dept: PHYSICAL THERAPY | Age: 75
Setting detail: THERAPIES SERIES
Discharge: HOME | End: 2025-06-26
Payer: MEDICARE

## 2025-06-26 PROCEDURE — 97535 SELF CARE MNGMENT TRAINING: CPT

## 2025-06-26 PROCEDURE — 97112 NEUROMUSCULAR REEDUCATION: CPT

## 2025-06-26 PROCEDURE — 97110 THERAPEUTIC EXERCISES: CPT

## 2025-06-26 NOTE — PROGRESS NOTES
elbow, then lean forward at waist to reach, crossing midline   Hold half [place], pro/sup with arms by body and away from body   Pt noted decreased supination with this activity compared to R   Hydrostick L UE   Min A to push forward and pull back    Sitting small blue ball- lift overhead, horiz abd/add, chest press   Also trialed with 2# weighted ball, she has one of these at home, for overhead flex only  Therapist provided manual assist for upward scapular rotation to increase ROM and target touches with cues for elbow extension  Pt then completed self assisted ROM with L palm down. Instructed pt to rotate palm up for ER to increase ROM.      Specific Interventions Next Treatment: Modify onion stabilizer for pt to be able to cut an onion mod I at home; weight bearing onto left UE, explore [place] options with pt, e-stim/[place] stim, AROM of left UE, functional tasks of left UE, education on progression of recovery    Activity/Treatment Tolerance:  [x]  Patient tolerated treatment well  []  Patient limited by fatigue  []  Patient limited by pain   []  Patient limited by other medical complications  []  Other:     Assessment: Patient is progressing towards her goals.      Areas for Improvement: edema, impaired cognition, impaired coordination,  endurance, impaired motor control, impaired ROM, and impaired strength  Prognosis: fair    GOALS:  Patient Goal: get use of her left hand so she can return sewing, cut food up, grasp onto objects    Short Term Goals:  Time Frame: 5 weeks   Pt to increase her strength of left UE to allow her to hold her arm at 90 degrees of flexion against gravity in prep for returning dishes into cupboard.   5/29/25 GOAL MET Pt to be able to hold her left UE at 90 degrees of flexion for at least 15 sec. She is unable to maintain the position against any resistance though due to decreased strength     REVISED GOAL: Pt to increase her left UE shoulder strength to be able to hold against light

## 2025-06-26 NOTE — PROGRESS NOTES
THERAPY BENEFIT:  No visit limit. Treatment must be medically necessary and must required the skill of a licensed therapist or therapist assistant.  Benefit will not cover maintenance or preventative treatment.  AQUATIC THERAPY COVERED:   Yes  MODALITIES COVERED:  Yes with the exception of iontophoresis and Hot/Cold Packs   Initial CPT Codes Requested 24093 - Therapeutic Exercise, 71576 - Manual Therapy , 13390 - Aquatic Therapeutic Exercise, 01764 - Neuromuscular Re-Education , 45022 - Gait Training, 48909 - Therapeutic Activities, 19689 - Canalith Repositioning Procedure, and 04593 - Manual Electrical Stimulation   Progress Note Counter 16, 7/10 for progress note (Reporting Period: 5/27/25 to 6/27/25  )   Recertification Date 07/16/25   Physician Follow-Up Patient unsure if she follows with neurologist. PCP following, urology consulted, cardio consulted and 48 hr monitor planned for May.    Physician Orders    History of Present Illness December 12 - noticed left side of her body going numb, went immediately to ER but not brought back in a timely manner and she was not given the shot because verbally reported she has blood in her urine. Patient was upset about her care in the ER and the slow care she received. Supportive listening provided, but given re-direction to her current goals and deficits.    Admitted to hospital and IP rehab through January 9th - significant left hemiparesis and needing a platform rolling walker for the left arm, losing balance toward the left.    Discharged to the [facility]. Discharged home February 22nd.   Home health PT/OT twice per week. Home nursing once per week. Her rehab staff were trying to get her to use a sharmila-walker, but she strongly preferred a walker. Discharged from home health mid-April.    Since being home, patient struggles to use her Left hand and has difficulty cutting food with a knife. Patient feels that she is struggling with her endurance and becoming fatigued,

## 2025-07-01 ENCOUNTER — HOSPITAL ENCOUNTER (OUTPATIENT)
Dept: OCCUPATIONAL THERAPY | Age: 75
Setting detail: THERAPIES SERIES
Discharge: HOME | End: 2025-07-01
Payer: MEDICARE

## 2025-07-01 ENCOUNTER — HOSPITAL ENCOUNTER (OUTPATIENT)
Dept: PHYSICAL THERAPY | Age: 75
Setting detail: THERAPIES SERIES
Discharge: HOME | End: 2025-07-01
Payer: MEDICARE

## 2025-07-01 PROCEDURE — 97110 THERAPEUTIC EXERCISES: CPT

## 2025-07-01 PROCEDURE — 97112 NEUROMUSCULAR REEDUCATION: CPT

## 2025-07-01 NOTE — PROGRESS NOTES
Information INSURANCE THERAPY BENEFIT:  No visit limit. Treatment must be medically necessary and must required the skill of a licensed therapist or therapist assistant.  Benefit will not cover maintenance or preventative treatment.  AQUATIC THERAPY COVERED:   Yes  MODALITIES COVERED:  Yes with the exception of iontophoresis and Hot/Cold Packs   Initial CPT Codes Requested 53212 - Therapeutic Exercise, 33419 - Manual Therapy , 36556 - Aquatic Therapeutic Exercise, 57417 - Neuromuscular Re-Education , 69441 - Gait Training, 74325 - Therapeutic Activities, 85163 - Canalith Repositioning Procedure, and 64088 - Manual Electrical Stimulation   Progress Note Counter 17, 8/10 for progress note (Reporting Period: 5/27/25 to  )   Recertification Date 07/16/25   Physician Follow-Up Patient unsure if she follows with neurologist. PCP following, urology consulted, cardio consulted and 48 hr monitor planned for May.    Physician Orders    History of Present Illness December 12 - noticed left side of her body going numb, went immediately to ER but not brought back in a timely manner and she was not given the shot because verbally reported she has blood in her urine. Patient was upset about her care in the ER and the slow care she received. Supportive listening provided, but given re-direction to her current goals and deficits.    Admitted to hospital and IP rehab through January 9th - significant left hemiparesis and needing a platform rolling walker for the left arm, losing balance toward the left.    Discharged to the [facility]. Discharged home February 22nd.   Home health PT/OT twice per week. Home nursing once per week. Her rehab staff were trying to get her to use a sharmila-walker, but she strongly preferred a walker. Discharged from home health mid-April.    Since being home, patient struggles to use her Left hand and has difficulty cutting food with a knife. Patient feels that she is struggling with her endurance and

## 2025-07-01 NOTE — PROGRESS NOTES
[facility]  OCCUPATIONAL THERAPY  [] EVALUATION  [x] DAILY NOTE (LAND) [] DAILY NOTE (AQUATIC ) [] PROGRESS NOTE [] DISCHARGE NOTE    [x] OUTPATIENT REHABILITATION CENTER [facility]   [] [place] [facility]    [] [facility]   [] [facility]    Date: 2025  Patient Name:  Dara Sanz  : 1950  MRN: [id number]  CSN: [id number]    Referring Practitioner Johanna Oh, APRN - CNP [phone]      Diagnosis  Diagnoses       G81.94 (ICD-10-CM) - Hemiplegia affecting left nondominant side, unspecified etiology, unspecified hemiplegia type ([facility])           Treatment Diagnosis M62.81 Generalized Muscle Weakness  G81.93 Hemiplegia, Unspecific Affecting Left Dominant Side   Date of Evaluation 25   Additional Pertinent History Dara Sanz has a past medical history of Anxiety, Arthritis, CAD (coronary artery disease), Cerebral artery occlusion with cerebral infarction (HCC), GERD (gastroesophageal reflux disease), History of kidney stones, Hyperlipidemia, Hypertension, Kidney stone, Osteoarthritis, and Sleep apnea.  she has a past surgical history that includes Colonoscopy (2012); Dilation and curettage of uterus (); Lithotripsy (2011); Foot surgery (Left, ); Cystoscopy (); Throat surgery (2014); Cardiac catheterization; Diagnostic Cardiac Cath Lab Procedure (); Incisional breast biopsy (Left, ); and Ureter surgery (Right, 2024).     Allergies Allergies   Allergen Reactions    Other      Other reaction(s): Other  Other reaction(s): Unknown    Sulfa Antibiotics     Trulicity [Dulaglutide]      Kidney issue and tiredness.    Dilaudid [Hydromorphone Hcl] Nausea And Vomiting    Toradol [Ketorolac Tromethamine] Other (See Comments)     Lethargic, felt \"weird\"    Tramadol Nausea And Vomiting and Other (See Comments)     Pt states this medication makes her Loopy      Medications   Current Outpatient Medications:     solifenacin

## 2025-07-03 ENCOUNTER — HOSPITAL ENCOUNTER (OUTPATIENT)
Dept: PHYSICAL THERAPY | Age: 75
Setting detail: THERAPIES SERIES
Discharge: HOME | End: 2025-07-03
Payer: MEDICARE

## 2025-07-03 ENCOUNTER — HOSPITAL ENCOUNTER (OUTPATIENT)
Dept: OCCUPATIONAL THERAPY | Age: 75
Setting detail: THERAPIES SERIES
Discharge: HOME | End: 2025-07-03
Payer: MEDICARE

## 2025-07-03 PROCEDURE — 97110 THERAPEUTIC EXERCISES: CPT

## 2025-07-03 PROCEDURE — 97112 NEUROMUSCULAR REEDUCATION: CPT

## 2025-07-03 NOTE — PROGRESS NOTES
[facility]  PHYSICAL THERAPY  [] EVALUATION  [] DAILY NOTE (LAND) [] DAILY NOTE (AQUATIC  ) [x] PROGRESS NOTE [] DISCHARGE NOTE    [x] OUTPATIENT REHABILITATION [facility]   [] [place] [facility] [place]    [] [facility]   [] [facility]    Date: 7/3/2025  Patient Name:  Dara Sanz  : 1950  MRN: [id number]  CSN: [id number]    Referring Practitioner Johanna Oh, APRN - CNP [phone]      Diagnosis  Diagnoses       G81.94 (ICD-10-CM) - Hemiplegia affecting left nondominant side, unspecified etiology, unspecified hemiplegia type ([facility])           Treatment Diagnosis G81.90 Hemiplegia Affecting Unspecified Side  R26.81  Unsteadiness on feet  I69.30 Unspecified Sequelae of Cerebral Infarction  M62.81 Generalized Weakness  Z91.81 History of falling/At risk for falling   Date of Evaluation 25   Additional Pertinent History Dara Sanz has a past medical history of Anxiety, Arthritis, CAD (coronary artery disease), Cerebral artery occlusion with cerebral infarction (HCC), GERD (gastroesophageal reflux disease), History of kidney stones, Hyperlipidemia, Hypertension, Kidney stone, Osteoarthritis, and Sleep apnea.  she has a past surgical history that includes Colonoscopy (2012); Dilation and curettage of uterus (); Lithotripsy (2011); Foot surgery (Left, ); Cystoscopy (); Throat surgery (2014); Cardiac catheterization; Diagnostic Cardiac Cath Lab Procedure (); Incisional breast biopsy (Left, ); and Ureter surgery (Right, 2024).     Allergies Allergies   Allergen Reactions    Other      Other reaction(s): Other  Other reaction(s): Unknown    Sulfa Antibiotics     Trulicity [Dulaglutide]      Kidney issue and tiredness.    Dilaudid [Hydromorphone Hcl] Nausea And Vomiting    Toradol [Ketorolac Tromethamine] Other (See Comments)     Lethargic, felt \"weird\"    Tramadol Nausea And Vomiting and Other (See Comments)     Pt

## 2025-07-03 NOTE — PROGRESS NOTES
Pt to increase her strength of left UE to allow her to hold her arm at 90 degrees of flexion against gravity in prep for returning dishes into cupboard.   5/29/25 GOAL MET Pt to be able to hold her left UE at 90 degrees of flexion for at least 15 sec. She is unable to maintain the position against any resistance though due to decreased strength     REVISED GOAL: Pt to increase her left UE shoulder strength to be able to hold against light resistance at 90 degrees of flexion to increase ease of placing dishes in cupboard    Pt to be able to educate on compensatory tech/adaptive equipment to use at home to increase ease of completing her simple meal prep and other ADL tasks   5/29/25 GOAL NOT MET/CONTINUE     Pt to be able to grasp onto a object between 1-2 inches and place to onto counter with mod assistance to release in prep for holding onto a cup.    5/29/25 GOAL MET Pt able to grasp onto cones as well as 1 inch block this date in more of a raking motion. She has difficulty releasing object due to her tone increasing when attempting   REVISED GOAL Pt to be able to grasp onto cup or plate to place on lower shelf of upper cupboard    Pt to be able to increase coordination of left hand to allow her to oppose thumb to L2 to complete a tip pinch.    5/29/25 GOAL MET Pt able to oppose thumb to L2    REVISED GOAL Pt to increase FMC to be able to  a peg from 9 hole peg test with min difficulty     5. NEW GOAL: Pt to complete light homemaking tasks using her left UE as a stabilizer such as folding laundry, cooking (stirring), sweeping, etc with min difficulty  6. NEW GOAL: Pt to increase her gross grasp strength of left hand > 10# for ease of gripping onto her walker    Long Term Goals:  Time Frame: 12 weeks   Pt to be able to cook a simple meal using left UE and other tech/equipment to assist with no difficulty  Pt to be able to complete her ADL Tasks including grooming of hair with no difficulty     Patient

## 2025-07-08 ENCOUNTER — HOSPITAL ENCOUNTER (OUTPATIENT)
Dept: OCCUPATIONAL THERAPY | Age: 75
Setting detail: THERAPIES SERIES
Discharge: HOME | End: 2025-07-08
Payer: MEDICARE

## 2025-07-08 ENCOUNTER — HOSPITAL ENCOUNTER (OUTPATIENT)
Dept: PHYSICAL THERAPY | Age: 75
Setting detail: THERAPIES SERIES
Discharge: HOME | End: 2025-07-08
Payer: MEDICARE

## 2025-07-08 PROCEDURE — 97110 THERAPEUTIC EXERCISES: CPT

## 2025-07-08 PROCEDURE — 97112 NEUROMUSCULAR REEDUCATION: CPT

## 2025-07-08 NOTE — PROGRESS NOTES
Motion, Neuromuscular Re-education, Manual Therapy - Soft Tissue Mobilization, Manual Therapy - Joint Manipulation, Pain Management, Home Exercise Program, Patient Education, Safety Education and Training, and Self-Care Education and Training, Modalities )e-stim)    []  Plan of care initiated.  Plan to see patient 2 times per week for 12 weeks to address the treatment planned outlined above.  [x]  Continue with current plan of care  []  Modify plan of care as follows:    []  Hold pending physician visit  []  Discharge    Time In 1200   Time Out 1245   Timed Code Minutes: 45 min   Total Treatment Time: 45 min     COOPER Ron/DICK, T  #[id number]

## 2025-07-08 NOTE — PROGRESS NOTES
march, tandem walking, retro walking*  2 laps ea  X    Low neil sling shot/3 inch wooden box  12 ea    Wooden box used bilaterally today, difficulty with L foot clearance           Seated:        Hamstring stretching  2x30s ea  X    Calf stretch with sheet  2x30s ea       LAQ  10x2s      Resisted HS curls  10 ea  [place]                    Mat table: with wedge and 2 pillows        Piriformis stretch - leg crossed or therapist assistance       Mini bridge  2x6   Cues for even push through Les    Hooklying iso hip abd 10x3s             REASSESSMENT: performed Moran Balance Scale, and performed supine strength testing for left ankle and left hip 30 mins   Progress made, see goals section.                    Specific Interventions Next Treatment: NuStep warm up, Left leg strengthening and core strength on mat table, standing balance and gait training. Raising Left leg high enough during vehicle transfers and shower transfers. Patient does not have steps in her home, but ok to progress to 4 inch step for balance training. SOB and decreased endurance, monitor O2 as needed.     Activity/Treatment Tolerance:  [x]  Patient tolerated treatment well  []  Patient limited by fatigue  []  Patient limited by pain   []  Patient limited by medical complications   []  Other:     Assessment: Patient completed strengthening and balance exercises this session. Progressions made as shown by an *. Still limited with the amount of balance progressions made due to her ankle pain flaring up. Patient also reported her ankle wanting to roll while standing on airex foam pad. Patient noted dizziness with head turns, held at this time. Cues provided for exercise techniques with newly added exercises. She noted a good challenge with exercise completion this session. Plan to progress as able with balance, strengthening and functional mobility.      GOALS:  Patient Goal: improve safety with transfers into the car and shower, catches her foot often.

## 2025-07-10 ENCOUNTER — HOSPITAL ENCOUNTER (OUTPATIENT)
Dept: OCCUPATIONAL THERAPY | Age: 75
Setting detail: THERAPIES SERIES
Discharge: HOME | End: 2025-07-10
Payer: MEDICARE

## 2025-07-10 ENCOUNTER — HOSPITAL ENCOUNTER (OUTPATIENT)
Dept: PHYSICAL THERAPY | Age: 75
Setting detail: THERAPIES SERIES
Discharge: HOME | End: 2025-07-10
Payer: MEDICARE

## 2025-07-10 PROCEDURE — 97110 THERAPEUTIC EXERCISES: CPT

## 2025-07-10 NOTE — PROGRESS NOTES
states this medication makes her Loopy      Medications   Current Outpatient Medications:     solifenacin (VESICARE) 10 MG tablet, Take 1 tablet by mouth daily, Disp: 90 tablet, Rfl: 3    furosemide (LASIX) 20 MG tablet, Take 1 tablet by mouth daily, Disp: 180 tablet, Rfl: 3    ramipril (ALTACE) 10 MG capsule, Take 1 capsule by mouth daily, Disp: 90 capsule, Rfl: 3    metoprolol tartrate (LOPRESSOR) 50 MG tablet, Take 1 tablet by mouth 2 times daily, Disp: 180 tablet, Rfl: 3    clopidogrel (PLAVIX) 75 MG tablet, Take 1 tablet by mouth daily, Disp: 90 tablet, Rfl: 3    atorvastatin (LIPITOR) 40 MG tablet, Take 1 tablet by mouth daily, Disp: 90 tablet, Rfl: 3    aspirin 81 MG EC tablet, Take 1 tablet by mouth daily, Disp: 90 tablet, Rfl: 3    amLODIPine (NORVASC) 2.5 MG tablet, Take 1 tablet by mouth daily, Disp: 90 tablet, Rfl: 3    Handicap Placard MISC, by Does not apply route Valid for 10 years, Disp: 1 each, Rfl: 0    acetaminophen (AMINOFEN) 325 MG tablet, Take 2 tablets by mouth every 6 hours as needed for Pain, Disp: , Rfl:     Omega 3 1000 MG CAPS, Take by mouth 2 tablets daily, Disp: , Rfl:     MAGNESIUM CITRATE PO, Take by mouth 400 mg daily, Disp: , Rfl:     Ascorbic Acid (VITAMIN C) 1000 MG tablet, Take 1 tablet by mouth daily, Disp: , Rfl:     Zinc Sulfate (ZINC-220 PO), Take by mouth daily One tablet - unsure of dosage, Disp: , Rfl:     Multiple Vitamins-Minerals (MULTIVITAMIN ADULT EXTRA C PO), , Disp: , Rfl:     Cholecalciferol (VITAMIN D3) 50 MCG (2000 UT) TABS, Take by mouth daily, Disp: , Rfl:     Black Elderberry (SAMBUCUS ELDERBERRY PO), Take 1,250 mg by mouth, Disp: , Rfl:     NONFORMULARY, Take 1 capsule by mouth daily [employer]  - Carotenoid 10mg 1xday, Disp: , Rfl:       Functional Outcome Measure Used Moran Balance Scale    Functional Outcome Score 32/56 (4/22/25)   40/56 (5/27/25)   42/56 (7/1/25)       Insurance: Primary: Payor: MEDICARE /  /  / ,   Secondary: [employer]   Authorization

## 2025-07-10 NOTE — PROGRESS NOTES
Goals:  Time Frame: 5 weeks   Pt to increase her strength of left UE to allow her to hold her arm at 90 degrees of flexion against gravity in prep for returning dishes into cupboard.   5/29/25 GOAL MET Pt to be able to hold her left UE at 90 degrees of flexion for at least 15 sec. She is unable to maintain the position against any resistance though due to decreased strength     Pt to increase her left UE shoulder strength to be able to hold against light resistance at 90 degrees of flexion to increase ease of placing dishes in cupboard.  7/10/25:  GOAL NOT MET.  Patient able to hold her arm up for 12 seconds before lowering it down.  CONTINUE GOAL.     Pt to be able to educate on compensatory tech/adaptive equipment to use at home to increase ease of completing her simple meal prep and other ADL tasks   7/10/25 GOAL PARTIALLY MET/CONTINUE     Pt to be able to grasp onto a object between 1-2 inches and place to onto counter with mod assistance to release in prep for holding onto a cup.    5/29/25 GOAL MET Pt able to grasp onto cones as well as 1 inch block this date in more of a raking motion. She has difficulty releasing object due to her tone increasing when attempting   REVISED GOAL Pt to be able to grasp onto cup or plate to place on lower shelf of upper cupboard.  7/10/25:  GOAL PARTIALLY MET.  Patient able to grasp onto light weight cup with handles with left hand to place on counter and into shoulder level shelf but unsteady. CONTINUE    Pt to be able to increase coordination of left hand to allow her to oppose thumb to L2 to complete a tip pinch.    5/29/25 GOAL MET Pt able to oppose thumb to L2    REVISED GOAL Pt to increase FMC to be able to  a peg from 9 hole peg test with min difficulty.  7/10/25:  GOAL PARTIALLY MET.  Patient able to take pegs out of 9 hole peg board with left hand.  Patient place one 9 hole peg from table top to peg board in 2 minutes.     REVISED GOAL:  Patient will increase FMC

## 2025-07-15 ENCOUNTER — HOSPITAL ENCOUNTER (OUTPATIENT)
Dept: OCCUPATIONAL THERAPY | Age: 75
Setting detail: THERAPIES SERIES
Discharge: HOME | End: 2025-07-15
Payer: MEDICARE

## 2025-07-15 ENCOUNTER — HOSPITAL ENCOUNTER (OUTPATIENT)
Dept: PHYSICAL THERAPY | Age: 75
Setting detail: THERAPIES SERIES
Discharge: HOME | End: 2025-07-15
Payer: MEDICARE

## 2025-07-15 PROCEDURE — 97110 THERAPEUTIC EXERCISES: CPT

## 2025-07-15 PROCEDURE — 97535 SELF CARE MNGMENT TRAINING: CPT

## 2025-07-15 PROCEDURE — 97112 NEUROMUSCULAR REEDUCATION: CPT

## 2025-07-15 NOTE — PROGRESS NOTES
[facility]  PHYSICAL THERAPY  [] EVALUATION  [x] DAILY NOTE (LAND) [] DAILY NOTE (AQUATIC  ) [] PROGRESS NOTE [] DISCHARGE NOTE    [x] OUTPATIENT REHABILITATION [facility]   [] [place] [facility] [place]    [] [facility]   [] [facility]    Date: 7/15/2025  Patient Name:  Dara Sanz  : 1950  MRN: [id number]  CSN: [id number]    Referring Practitioner Johanna Oh, APRN - CNP [id number]      Diagnosis  Diagnoses       G81.94 (ICD-10-CM) - Hemiplegia affecting left nondominant side, unspecified etiology, unspecified hemiplegia type ([facility])           Treatment Diagnosis G81.90 Hemiplegia Affecting Unspecified Side  R26.81  Unsteadiness on feet  I69.30 Unspecified Sequelae of Cerebral Infarction  M62.81 Generalized Weakness  Z91.81 History of falling/At risk for falling   Date of Evaluation 25   Additional Pertinent History Dara Sanz has a past medical history of Anxiety, Arthritis, CAD (coronary artery disease), Cerebral artery occlusion with cerebral infarction (HCC), GERD (gastroesophageal reflux disease), History of kidney stones, Hyperlipidemia, Hypertension, Kidney stone, Osteoarthritis, and Sleep apnea.  she has a past surgical history that includes Colonoscopy (2012); Dilation and curettage of uterus (); Lithotripsy (2011); Foot surgery (Left, ); Cystoscopy (); Throat surgery (2014); Cardiac catheterization; Diagnostic Cardiac Cath Lab Procedure (); Incisional breast biopsy (Left, ); and Ureter surgery (Right, 2024).     Allergies Allergies   Allergen Reactions    Other      Other reaction(s): Other  Other reaction(s): Unknown    Sulfa Antibiotics     Trulicity [Dulaglutide]      Kidney issue and tiredness.    Dilaudid [Hydromorphone Hcl] Nausea And Vomiting    Toradol [Ketorolac Tromethamine] Other (See Comments)     Lethargic, felt \"weird\"    Tramadol Nausea And Vomiting and Other (See Comments)     Pt

## 2025-07-15 NOTE — PROGRESS NOTES
[facility]  OCCUPATIONAL THERAPY  [] EVALUATION  [x] DAILY NOTE (LAND) [] DAILY NOTE (AQUATIC ) [] PROGRESS NOTE [] DISCHARGE NOTE    [x] OUTPATIENT REHABILITATION CENTER [facility]   [] [place] [facility]    [] [facility]   [] [facility]    Date: 7/15/2025  Patient Name:  Dara Sanz  : 1950  MRN: [id number]  CSN: [id number]    Referring Practitioner Johanna Oh, APRN - CNP [id number]      Diagnosis  Diagnoses       G81.94 (ICD-10-CM) - Hemiplegia affecting left nondominant side, unspecified etiology, unspecified hemiplegia type ([facility])           Treatment Diagnosis M62.81 Generalized Muscle Weakness  G81.93 Hemiplegia, Unspecific Affecting Left Dominant Side   Date of Evaluation 25   Additional Pertinent History Dara Sanz has a past medical history of Anxiety, Arthritis, CAD (coronary artery disease), Cerebral artery occlusion with cerebral infarction (HCC), GERD (gastroesophageal reflux disease), History of kidney stones, Hyperlipidemia, Hypertension, Kidney stone, Osteoarthritis, and Sleep apnea.  she has a past surgical history that includes Colonoscopy (2012); Dilation and curettage of uterus (); Lithotripsy (2011); Foot surgery (Left, ); Cystoscopy (); Throat surgery (2014); Cardiac catheterization; Diagnostic Cardiac Cath Lab Procedure (); Incisional breast biopsy (Left, ); and Ureter surgery (Right, 2024).     Allergies Allergies   Allergen Reactions    Other      Other reaction(s): Other  Other reaction(s): Unknown    Sulfa Antibiotics     Trulicity [Dulaglutide]      Kidney issue and tiredness.    Dilaudid [Hydromorphone Hcl] Nausea And Vomiting    Toradol [Ketorolac Tromethamine] Other (See Comments)     Lethargic, felt \"weird\"    Tramadol Nausea And Vomiting and Other (See Comments)     Pt states this medication makes her Loopy      Medications   Current Outpatient Medications:     solifenacin

## 2025-07-17 ENCOUNTER — HOSPITAL ENCOUNTER (OUTPATIENT)
Dept: OCCUPATIONAL THERAPY | Age: 75
Setting detail: THERAPIES SERIES
Discharge: HOME | End: 2025-07-17
Payer: MEDICARE

## 2025-07-17 ENCOUNTER — HOSPITAL ENCOUNTER (OUTPATIENT)
Dept: PHYSICAL THERAPY | Age: 75
Setting detail: THERAPIES SERIES
Discharge: HOME | End: 2025-07-17
Payer: MEDICARE

## 2025-07-17 PROCEDURE — 97112 NEUROMUSCULAR REEDUCATION: CPT

## 2025-07-17 PROCEDURE — 97110 THERAPEUTIC EXERCISES: CPT

## 2025-07-17 PROCEDURE — 97022 WHIRLPOOL THERAPY: CPT

## 2025-07-17 NOTE — PROGRESS NOTES
point pinch, rolling, gripping.  Encouragement given.  Patient voices frustration for not being able to extend index and middle finger normally as well as impaired sensation.    L thumb AROM  \"Thumb War\" Fingers in full flexion; forearm neutral  Thumb adduction, lift up, place in palmar abduction at edge of index finger  2. B thumb roll cw, ccw-hands clasped together  5x ea  Difficulty with \"thumb war\" ROM  Thumb rolls were relatively easy    Prayer Stretch 1; 3 sec 10  Cues for proper tech and breathing throughout.   Red therabar   Holding end supination/pronation  Palms down  twists 1 10 ea  Able to hold bar with left hand with moderate difficulty.   Wrist and digit extension against edge of table  5 sec 10  ^used yellow power web for added resistance this date   Activities Time    Notes/Comments   Goal assessment completed.      Patient picked up and place shaped wood pieces with holes in center onto dowel ok placed to the left of her.  3 drops of pieces.  Trialed sliding pieces off table to allow for improved use of index and thumb for 2 point pinch  X Encouragement given.   9 hole peg   Patient took pegs out of board with left hand with increased time.  Patient placed 1 peg in board off of washcloth on tabletop with increased time of 2 minutes.   Dyn[employer]   Mode A/60 using L hand rings 1-4. First trial 3 hits using index finger with finger flexed at PIP joint. 2nd trial 38 hits. Completed for GMC and strength for LUE.   Adaptive kitchen utensils   Introduced patient to adaptive kitchen utensils including rocker knife, dycem, and adaptive cutting board. Loaned patient rocker knife and adaptive cutting board to trial at home.   Nighttime functional position splint   Fit patient with functional position splint for either evening or nighttime wear. Instructed patient in wear and after demonstration, she was able to apply it herself.   Digit flexion and extension with grasping/release   With use of [employer] stim to

## 2025-07-17 NOTE — PROGRESS NOTES
[facility]  PHYSICAL THERAPY  [] EVALUATION  [x] DAILY NOTE (LAND) [] DAILY NOTE (AQUATIC  ) [] PROGRESS NOTE [] DISCHARGE NOTE    [x] OUTPATIENT REHABILITATION [facility]   [] [place] AM[facility]ULATORY CARE [place]    [] [facility]   [] [facility]    Date: 2025  Patient Name:  Dara Sanz  : 1950  MRN: [id number]  CSN: [id number]    Referring Practitioner Johanna Oh, APRN - CNP [phone]      Diagnosis  Diagnoses       G81.94 (ICD-10-CM) - Hemiplegia affecting left nondominant side, unspecified etiology, unspecified hemiplegia type ([facility])           Treatment Diagnosis G81.90 Hemiplegia Affecting Unspecified Side  R26.81  Unsteadiness on feet  I69.30 Unspecified Sequelae of Cerebral Infarction  M62.81 Generalized Weakness  Z91.81 History of falling/At risk for falling   Date of Evaluation 25   Additional Pertinent History Dara Sanz has a past medical history of Anxiety, Arthritis, CAD (coronary artery disease), Cerebral artery occlusion with cerebral infarction (HCC), GERD (gastroesophageal reflux disease), History of kidney stones, Hyperlipidemia, Hypertension, Kidney stone, Osteoarthritis, and Sleep apnea.  she has a past surgical history that includes Colonoscopy (2012); Dilation and curettage of uterus (); Lithotripsy (2011); Foot surgery (Left, ); Cystoscopy (); Throat surgery (2014); Cardiac catheterization; Diagnostic Cardiac Cath Lab Procedure (); Incisional breast biopsy (Left, ); and Ureter surgery (Right, 2024).     Allergies Allergies   Allergen Reactions    Other      Other reaction(s): Other  Other reaction(s): Unknown    Sulfa Antibiotics     Trulicity [Dulaglutide]      Kidney issue and tiredness.    Dilaudid [Hydromorphone Hcl] Nausea And Vomiting    Toradol [Ketorolac Tromethamine] Other (See Comments)     Lethargic, felt \"weird\"    Tramadol Nausea And Vomiting and Other (See Comments)     Pt

## 2025-07-22 ENCOUNTER — HOSPITAL ENCOUNTER (OUTPATIENT)
Dept: PHYSICAL THERAPY | Age: 75
Setting detail: THERAPIES SERIES
Discharge: HOME | End: 2025-07-22
Payer: MEDICARE

## 2025-07-22 ENCOUNTER — HOSPITAL ENCOUNTER (OUTPATIENT)
Dept: OCCUPATIONAL THERAPY | Age: 75
Setting detail: THERAPIES SERIES
Discharge: HOME | End: 2025-07-22
Payer: MEDICARE

## 2025-07-22 PROCEDURE — 97110 THERAPEUTIC EXERCISES: CPT

## 2025-07-22 PROCEDURE — 97535 SELF CARE MNGMENT TRAINING: CPT

## 2025-07-22 PROCEDURE — 97112 NEUROMUSCULAR REEDUCATION: CPT

## 2025-07-22 NOTE — PROGRESS NOTES
[facility]  OCCUPATIONAL THERAPY  [] EVALUATION  [x] DAILY NOTE (LAND) [] DAILY NOTE (AQUATIC ) [] PROGRESS NOTE [] DISCHARGE NOTE    [x] OUTPATIENT REHABILITATION CENTER [facility]   [] [place] AM[facility]ULATORY CARE [place]    [] [facility]   [] [facility]    Date: 2025  Patient Name:  Dara Sanz  : 1950  MRN: [id number]  CSN: [id number]    Referring Practitioner Johanna Oh, APRN - CNP [phone]      Diagnosis  Diagnoses       G81.94 (ICD-10-CM) - Hemiplegia affecting left nondominant side, unspecified etiology, unspecified hemiplegia type ([facility])           Treatment Diagnosis M62.81 Generalized Muscle Weakness  G81.93 Hemiplegia, Unspecific Affecting Left Dominant Side   Date of Evaluation 25   Additional Pertinent History Dara Sanz has a past medical history of Anxiety, Arthritis, CAD (coronary artery disease), Cerebral artery occlusion with cerebral infarction (HCC), GERD (gastroesophageal reflux disease), History of kidney stones, Hyperlipidemia, Hypertension, Kidney stone, Osteoarthritis, and Sleep apnea.  she has a past surgical history that includes Colonoscopy (2012); Dilation and curettage of uterus (); Lithotripsy (2011); Foot surgery (Left, ); Cystoscopy (); Throat surgery (2014); Cardiac catheterization; Diagnostic Cardiac Cath Lab Procedure (); Incisional breast biopsy (Left, ); and Ureter surgery (Right, 2024).     Allergies Allergies   Allergen Reactions    Other      Other reaction(s): Other  Other reaction(s): Unknown    Sulfa Antibiotics     Trulicity [Dulaglutide]      Kidney issue and tiredness.    Dilaudid [Hydromorphone Hcl] Nausea And Vomiting    Toradol [Ketorolac Tromethamine] Other (See Comments)     Lethargic, felt \"weird\"    Tramadol Nausea And Vomiting and Other (See Comments)     Pt states this medication makes her Loopy      Medications   Current Outpatient Medications:     solifenacin

## 2025-07-22 NOTE — PROGRESS NOTES
[facility]  PHYSICAL THERAPY  [] EVALUATION  [x] DAILY NOTE (LAND) [] DAILY NOTE (AQUATIC  ) [] PROGRESS NOTE [] DISCHARGE NOTE    [x] OUTPATIENT REHABILITATION [facility]   [] [place] AMBULATORY CARE [place]    [] [facility]   [] [facility]    Date: 2025  Patient Name:  Dara Sanz  : 1950  MRN: [id number]  CSN: [id number]    Referring Practitioner Johanna Oh, APRN - CNP [id number]      Diagnosis  Diagnoses       G81.94 (ICD-10-CM) - Hemiplegia affecting left nondominant side, unspecified etiology, unspecified hemiplegia type ([facility])           Treatment Diagnosis G81.90 Hemiplegia Affecting Unspecified Side  R26.81  Unsteadiness on feet  I69.30 Unspecified Sequelae of Cerebral Infarction  M62.81 Generalized Weakness  Z91.81 History of falling/At risk for falling   Date of Evaluation 25   Additional Pertinent History Dara Sanz has a past medical history of Anxiety, Arthritis, CAD (coronary artery disease), Cerebral artery occlusion with cerebral infarction (HCC), GERD (gastroesophageal reflux disease), History of kidney stones, Hyperlipidemia, Hypertension, Kidney stone, Osteoarthritis, and Sleep apnea.  she has a past surgical history that includes Colonoscopy (2012); Dilation and curettage of uterus (); Lithotripsy (2011); Foot surgery (Left, ); Cystoscopy (); Throat surgery (2014); Cardiac catheterization; Diagnostic Cardiac Cath Lab Procedure (); Incisional breast biopsy (Left, ); and Ureter surgery (Right, 2024).     Allergies Allergies   Allergen Reactions    Other      Other reaction(s): Other  Other reaction(s): Unknown    Sulfa Antibiotics     Trulicity [Dulaglutide]      Kidney issue and tiredness.    Dilaudid [Hydromorphone Hcl] Nausea And Vomiting    Toradol [Ketorolac Tromethamine] Other (See Comments)     Lethargic, felt \"weird\"    Tramadol Nausea And Vomiting and Other (See Comments)     Pt

## 2025-07-24 ENCOUNTER — HOSPITAL ENCOUNTER (OUTPATIENT)
Dept: PHYSICAL THERAPY | Age: 75
Setting detail: THERAPIES SERIES
Discharge: HOME | End: 2025-07-24
Payer: MEDICARE

## 2025-07-24 ENCOUNTER — HOSPITAL ENCOUNTER (OUTPATIENT)
Dept: OCCUPATIONAL THERAPY | Age: 75
Setting detail: THERAPIES SERIES
Discharge: HOME | End: 2025-07-24
Payer: MEDICARE

## 2025-07-24 PROCEDURE — 97110 THERAPEUTIC EXERCISES: CPT

## 2025-07-24 PROCEDURE — 97112 NEUROMUSCULAR REEDUCATION: CPT

## 2025-07-24 PROCEDURE — 97022 WHIRLPOOL THERAPY: CPT

## 2025-07-24 NOTE — PROGRESS NOTES
[facility]  OCCUPATIONAL THERAPY  [] EVALUATION  [x] DAILY NOTE (LAND) [] DAILY NOTE (AQUATIC ) [] PROGRESS NOTE [] DISCHARGE NOTE    [x] OUTPATIENT REHABILITATION CENTER [facility]   [] [place] AM[facility]ULATORY CARE CENTER    [] [facility]   [] [facility]    Date: 2025  Patient Name:  Dara Sanz  : 1950  MRN: [id number]  CSN: [id number]    Referring Practitioner Johanna Oh, APRN - CNP [phone]      Diagnosis  Diagnoses       G81.94 (ICD-10-CM) - Hemiplegia affecting left nondominant side, unspecified etiology, unspecified hemiplegia type ([facility])           Treatment Diagnosis M62.81 Generalized Muscle Weakness  G81.93 Hemiplegia, Unspecific Affecting Left Dominant Side   Date of Evaluation 25   Additional Pertinent History Dara Sanz has a past medical history of Anxiety, Arthritis, CAD (coronary artery disease), Cerebral artery occlusion with cerebral infarction (HCC), GERD (gastroesophageal reflux disease), History of kidney stones, Hyperlipidemia, Hypertension, Kidney stone, Osteoarthritis, and Sleep apnea.  she has a past surgical history that includes Colonoscopy (2012); Dilation and curettage of uterus (); Lithotripsy (2011); Foot surgery (Left, ); Cystoscopy (); Throat surgery (2014); Cardiac catheterization; Diagnostic Cardiac Cath Lab Procedure (); Incisional breast biopsy (Left, ); and Ureter surgery (Right, 2024).     Allergies Allergies   Allergen Reactions    Other      Other reaction(s): Other  Other reaction(s): Unknown    Sulfa Antibiotics     Trulicity [Dulaglutide]      Kidney issue and tiredness.    Dilaudid [Hydromorphone Hcl] Nausea And Vomiting    Toradol [Ketorolac Tromethamine] Other (See Comments)     Lethargic, felt \"weird\"    Tramadol Nausea And Vomiting and Other (See Comments)     Pt states this medication makes her Loopy      Medications   Current Outpatient Medications:     solifenacin

## 2025-07-24 NOTE — PROGRESS NOTES
[facility]  PHYSICAL THERAPY  [] EVALUATION  [x] DAILY NOTE (LAND) [] DAILY NOTE (AQUATIC  ) [] PROGRESS NOTE [] DISCHARGE NOTE    [x] OUTPATIENT REHABILITATION [facility]   [] [place] AMBULATORY CARE [place]    [] [facility]   [] [facility]    Date: 2025  Patient Name:  Dara Sanz  : 1950  MRN: [id number]  CSN: [id number]    Referring Practitioner Johanna Oh, APRN - CNP [phone]      Diagnosis  Diagnoses       G81.94 (ICD-10-CM) - Hemiplegia affecting left nondominant side, unspecified etiology, unspecified hemiplegia type ([facility])           Treatment Diagnosis G81.90 Hemiplegia Affecting Unspecified Side  R26.81  Unsteadiness on feet  I69.30 Unspecified Sequelae of Cerebral Infarction  M62.81 Generalized Weakness  Z91.81 History of falling/At risk for falling   Date of Evaluation 25   Additional Pertinent History Dara Sanz has a past medical history of Anxiety, Arthritis, CAD (coronary artery disease), Cerebral artery occlusion with cerebral infarction (HCC), GERD (gastroesophageal reflux disease), History of kidney stones, Hyperlipidemia, Hypertension, Kidney stone, Osteoarthritis, and Sleep apnea.  she has a past surgical history that includes Colonoscopy (2012); Dilation and curettage of uterus (); Lithotripsy (2011); Foot surgery (Left, ); Cystoscopy (); Throat surgery (2014); Cardiac catheterization; Diagnostic Cardiac Cath Lab Procedure (); Incisional breast biopsy (Left, ); and Ureter surgery (Right, 2024).     Allergies Allergies   Allergen Reactions    Other      Other reaction(s): Other  Other reaction(s): Unknown    Sulfa Antibiotics     Trulicity [Dulaglutide]      Kidney issue and tiredness.    Dilaudid [Hydromorphone Hcl] Nausea And Vomiting    Toradol [Ketorolac Tromethamine] Other (See Comments)     Lethargic, felt \"weird\"    Tramadol Nausea And Vomiting and Other (See Comments)     Pt

## 2025-07-29 ENCOUNTER — HOSPITAL ENCOUNTER (OUTPATIENT)
Dept: OCCUPATIONAL THERAPY | Age: 75
Setting detail: THERAPIES SERIES
Discharge: HOME | End: 2025-07-29
Payer: MEDICARE

## 2025-07-29 ENCOUNTER — HOSPITAL ENCOUNTER (OUTPATIENT)
Dept: PHYSICAL THERAPY | Age: 75
Setting detail: THERAPIES SERIES
Discharge: HOME | End: 2025-07-29
Payer: MEDICARE

## 2025-07-29 PROCEDURE — 97530 THERAPEUTIC ACTIVITIES: CPT

## 2025-07-29 PROCEDURE — 97112 NEUROMUSCULAR REEDUCATION: CPT

## 2025-07-29 PROCEDURE — 97110 THERAPEUTIC EXERCISES: CPT

## 2025-07-29 NOTE — PROGRESS NOTES
[facility]  PHYSICAL THERAPY  [] EVALUATION  [x] DAILY NOTE (LAND) [] DAILY NOTE (AQUATIC  ) [] PROGRESS NOTE [] DISCHARGE NOTE    [x] OUTPATIENT REHABILITATION [facility]   [] [place] AMBULATORY CARE [place]    [] [facility]   [] [facility]    Date: 2025  Patient Name:  Dara Sanz  : 1950  MRN: [id number]  CSN: [id number]    Referring Practitioner Johanna Oh, APRN - CNP [phone]      Diagnosis  Diagnoses       G81.94 (ICD-10-CM) - Hemiplegia affecting left nondominant side, unspecified etiology, unspecified hemiplegia type ([facility])           Treatment Diagnosis G81.90 Hemiplegia Affecting Unspecified Side  R26.81  Unsteadiness on feet  I69.30 Unspecified Sequelae of Cerebral Infarction  M62.81 Generalized Weakness  Z91.81 History of falling/At risk for falling   Date of Evaluation 25   Additional Pertinent History Dara Sanz has a past medical history of Anxiety, Arthritis, CAD (coronary artery disease), Cerebral artery occlusion with cerebral infarction (HCC), GERD (gastroesophageal reflux disease), History of kidney stones, Hyperlipidemia, Hypertension, Kidney stone, Osteoarthritis, and Sleep apnea.  she has a past surgical history that includes Colonoscopy (2012); Dilation and curettage of uterus (); Lithotripsy (2011); Foot surgery (Left, ); Cystoscopy (); Throat surgery (2014); Cardiac catheterization; Diagnostic Cardiac Cath Lab Procedure (); Incisional breast biopsy (Left, ); and Ureter surgery (Right, 2024).     Allergies Allergies   Allergen Reactions    Other      Other reaction(s): Other  Other reaction(s): Unknown    Sulfa Antibiotics     Trulicity [Dulaglutide]      Kidney issue and tiredness.    Dilaudid [Hydromorphone Hcl] Nausea And Vomiting    Toradol [Ketorolac Tromethamine] Other (See Comments)     Lethargic, felt \"weird\"    Tramadol Nausea And Vomiting and Other (See Comments)     Pt

## 2025-07-29 NOTE — PROGRESS NOTES
[facility]  OCCUPATIONAL THERAPY  [] EVALUATION  [x] DAILY NOTE (LAND) [] DAILY NOTE (AQUATIC ) [] PROGRESS NOTE [] DISCHARGE NOTE    [x] OUTPATIENT REHABILITATION CENTER [facility]   [] [place] AM[facility]ULATORY CARE CENTER    [] [facility]   [] [facility]    Date: 2025  Patient Name:  Dara Sanz  : 1950  MRN: [id number]  CSN: [id number]    Referring Practitioner Johanna Oh, APRN - CNP [phone]      Diagnosis  Diagnoses       G81.94 (ICD-10-CM) - Hemiplegia affecting left nondominant side, unspecified etiology, unspecified hemiplegia type ([facility])           Treatment Diagnosis M62.81 Generalized Muscle Weakness  G81.93 Hemiplegia, Unspecific Affecting Left Dominant Side   Date of Evaluation 25   Additional Pertinent History Dara Sanz has a past medical history of Anxiety, Arthritis, CAD (coronary artery disease), Cerebral artery occlusion with cerebral infarction (HCC), GERD (gastroesophageal reflux disease), History of kidney stones, Hyperlipidemia, Hypertension, Kidney stone, Osteoarthritis, and Sleep apnea.  she has a past surgical history that includes Colonoscopy (2012); Dilation and curettage of uterus (); Lithotripsy (2011); Foot surgery (Left, ); Cystoscopy (); Throat surgery (2014); Cardiac catheterization; Diagnostic Cardiac Cath Lab Procedure (); Incisional breast biopsy (Left, ); and Ureter surgery (Right, 2024).     Allergies Allergies   Allergen Reactions    Other      Other reaction(s): Other  Other reaction(s): Unknown    Sulfa Antibiotics     Trulicity [Dulaglutide]      Kidney issue and tiredness.    Dilaudid [Hydromorphone Hcl] Nausea And Vomiting    Toradol [Ketorolac Tromethamine] Other (See Comments)     Lethargic, felt \"weird\"    Tramadol Nausea And Vomiting and Other (See Comments)     Pt states this medication makes her Loopy      Medications   Current Outpatient Medications:     solifenacin

## 2025-07-31 ENCOUNTER — HOSPITAL ENCOUNTER (OUTPATIENT)
Dept: OCCUPATIONAL THERAPY | Age: 75
Setting detail: THERAPIES SERIES
Discharge: HOME | End: 2025-07-31
Payer: MEDICARE

## 2025-07-31 ENCOUNTER — HOSPITAL ENCOUNTER (OUTPATIENT)
Dept: PHYSICAL THERAPY | Age: 75
Setting detail: THERAPIES SERIES
Discharge: HOME | End: 2025-07-31
Payer: MEDICARE

## 2025-07-31 PROCEDURE — 97110 THERAPEUTIC EXERCISES: CPT

## 2025-07-31 PROCEDURE — 97022 WHIRLPOOL THERAPY: CPT

## 2025-07-31 NOTE — PROGRESS NOTES
[facility]  OCCUPATIONAL THERAPY  [] EVALUATION  [x] DAILY NOTE (LAND) [] DAILY NOTE (AQUATIC ) [] PROGRESS NOTE [] DISCHARGE NOTE    [x] OUTPATIENT REHABILITATION CENTER [facility]   [] [place] AM[facility]ULATORY CARE CENTER    [] [facility]   [] [facility]    Date: 2025  Patient Name:  Dara Sanz  : 1950  MRN: [id number]  CSN: [id number]    Referring Practitioner Johanna Oh, APRN - CNP [phone]      Diagnosis  Diagnoses       G81.94 (ICD-10-CM) - Hemiplegia affecting left nondominant side, unspecified etiology, unspecified hemiplegia type ([facility])           Treatment Diagnosis M62.81 Generalized Muscle Weakness  G81.93 Hemiplegia, Unspecific Affecting Left Dominant Side   Date of Evaluation 25   Additional Pertinent History Dara Sanz has a past medical history of Anxiety, Arthritis, CAD (coronary artery disease), Cerebral artery occlusion with cerebral infarction (HCC), GERD (gastroesophageal reflux disease), History of kidney stones, Hyperlipidemia, Hypertension, Kidney stone, Osteoarthritis, and Sleep apnea.  she has a past surgical history that includes Colonoscopy (2012); Dilation and curettage of uterus (); Lithotripsy (2011); Foot surgery (Left, ); Cystoscopy (); Throat surgery (2014); Cardiac catheterization; Diagnostic Cardiac Cath Lab Procedure (); Incisional breast biopsy (Left, ); and Ureter surgery (Right, 2024).     Allergies Allergies   Allergen Reactions    Other      Other reaction(s): Other  Other reaction(s): Unknown    Sulfa Antibiotics     Trulicity [Dulaglutide]      Kidney issue and tiredness.    Dilaudid [Hydromorphone Hcl] Nausea And Vomiting    Toradol [Ketorolac Tromethamine] Other (See Comments)     Lethargic, felt \"weird\"    Tramadol Nausea And Vomiting and Other (See Comments)     Pt states this medication makes her Loopy      Medications   Current Outpatient Medications:     solifenacin

## 2025-08-05 ENCOUNTER — HOSPITAL ENCOUNTER (OUTPATIENT)
Dept: OCCUPATIONAL THERAPY | Age: 75
Setting detail: THERAPIES SERIES
Discharge: HOME | End: 2025-08-05
Payer: MEDICARE

## 2025-08-05 ENCOUNTER — HOSPITAL ENCOUNTER (OUTPATIENT)
Dept: PHYSICAL THERAPY | Age: 75
Setting detail: THERAPIES SERIES
Discharge: HOME | End: 2025-08-05
Payer: MEDICARE

## 2025-08-05 PROCEDURE — 97110 THERAPEUTIC EXERCISES: CPT

## 2025-08-05 PROCEDURE — 97112 NEUROMUSCULAR REEDUCATION: CPT

## 2025-08-07 ENCOUNTER — HOSPITAL ENCOUNTER (OUTPATIENT)
Dept: GENERAL RADIOLOGY | Age: 75
Discharge: HOME | End: 2025-08-07
Payer: MEDICARE

## 2025-08-07 ENCOUNTER — APPOINTMENT (OUTPATIENT)
Dept: PHYSICAL THERAPY | Age: 75
End: 2025-08-07
Payer: MEDICARE

## 2025-08-07 ENCOUNTER — HOSPITAL ENCOUNTER (OUTPATIENT)
Dept: OCCUPATIONAL THERAPY | Age: 75
Setting detail: THERAPIES SERIES
Discharge: HOME | End: 2025-08-07
Payer: MEDICARE

## 2025-08-07 DIAGNOSIS — N20.0 KIDNEY STONES: ICD-10-CM

## 2025-08-07 PROCEDURE — 97110 THERAPEUTIC EXERCISES: CPT

## 2025-08-07 PROCEDURE — 74018 RADEX ABDOMEN 1 VIEW: CPT

## 2025-08-11 ENCOUNTER — HOSPITAL ENCOUNTER (OUTPATIENT)
Dept: OCCUPATIONAL THERAPY | Age: 75
Setting detail: THERAPIES SERIES
Discharge: HOME | End: 2025-08-11
Payer: MEDICARE

## 2025-08-11 ENCOUNTER — APPOINTMENT (OUTPATIENT)
Dept: PHYSICAL THERAPY | Age: 75
End: 2025-08-11
Payer: MEDICARE

## 2025-08-11 PROCEDURE — 97110 THERAPEUTIC EXERCISES: CPT

## 2025-08-12 ENCOUNTER — OFFICE VISIT (OUTPATIENT)
Dept: UROLOGY | Age: 75
End: 2025-08-12
Payer: MEDICARE

## 2025-08-12 VITALS — WEIGHT: 274 LBS | BODY MASS INDEX: 45.65 KG/M2 | HEIGHT: 65 IN | RESPIRATION RATE: 18 BRPM

## 2025-08-12 DIAGNOSIS — N39.46 URINARY INCONTINENCE, MIXED: ICD-10-CM

## 2025-08-12 DIAGNOSIS — N20.0 KIDNEY STONES: ICD-10-CM

## 2025-08-12 DIAGNOSIS — N32.81 OAB (OVERACTIVE BLADDER): Primary | ICD-10-CM

## 2025-08-12 PROCEDURE — 3017F COLORECTAL CA SCREEN DOC REV: CPT

## 2025-08-12 PROCEDURE — G8399 PT W/DXA RESULTS DOCUMENT: HCPCS

## 2025-08-12 PROCEDURE — 1123F ACP DISCUSS/DSCN MKR DOCD: CPT

## 2025-08-12 PROCEDURE — 1159F MED LIST DOCD IN RCRD: CPT

## 2025-08-12 PROCEDURE — 1090F PRES/ABSN URINE INCON ASSESS: CPT

## 2025-08-12 PROCEDURE — 99214 OFFICE O/P EST MOD 30 MIN: CPT

## 2025-08-12 PROCEDURE — 1036F TOBACCO NON-USER: CPT

## 2025-08-12 PROCEDURE — 0509F URINE INCON PLAN DOCD: CPT

## 2025-08-12 PROCEDURE — G8427 DOCREV CUR MEDS BY ELIG CLIN: HCPCS

## 2025-08-12 PROCEDURE — G8417 CALC BMI ABV UP PARAM F/U: HCPCS

## 2025-08-13 RX ORDER — RAMIPRIL 10 MG/1
10 CAPSULE ORAL DAILY
Qty: 90 CAPSULE | Refills: 0 | Status: SHIPPED | OUTPATIENT
Start: 2025-08-13

## 2025-08-13 RX ORDER — CLOPIDOGREL BISULFATE 75 MG/1
75 TABLET ORAL DAILY
Qty: 90 TABLET | Refills: 0 | Status: SHIPPED | OUTPATIENT
Start: 2025-08-13

## 2025-08-13 RX ORDER — METOPROLOL TARTRATE 50 MG
50 TABLET ORAL 2 TIMES DAILY
Qty: 180 TABLET | Refills: 0 | Status: SHIPPED | OUTPATIENT
Start: 2025-08-13

## 2025-08-13 RX ORDER — ATORVASTATIN CALCIUM 40 MG/1
40 TABLET, FILM COATED ORAL DAILY
Qty: 90 TABLET | Refills: 0 | Status: SHIPPED | OUTPATIENT
Start: 2025-08-13

## 2025-08-13 RX ORDER — FUROSEMIDE 20 MG/1
20 TABLET ORAL DAILY
Qty: 90 TABLET | Refills: 0 | Status: SHIPPED | OUTPATIENT
Start: 2025-08-13

## 2025-08-13 RX ORDER — AMLODIPINE BESYLATE 2.5 MG/1
2.5 TABLET ORAL DAILY
Qty: 90 TABLET | Refills: 0 | Status: SHIPPED | OUTPATIENT
Start: 2025-08-13

## 2025-08-14 ENCOUNTER — HOSPITAL ENCOUNTER (OUTPATIENT)
Dept: OCCUPATIONAL THERAPY | Age: 75
Setting detail: THERAPIES SERIES
Discharge: HOME | End: 2025-08-14
Payer: MEDICARE

## 2025-08-14 ENCOUNTER — HOSPITAL ENCOUNTER (OUTPATIENT)
Dept: PHYSICAL THERAPY | Age: 75
Setting detail: THERAPIES SERIES
End: 2025-08-14
Payer: MEDICARE

## 2025-08-14 PROCEDURE — 97110 THERAPEUTIC EXERCISES: CPT

## 2025-08-14 PROCEDURE — 97022 WHIRLPOOL THERAPY: CPT

## 2025-08-14 RX ORDER — SOLIFENACIN SUCCINATE 10 MG/1
10 TABLET, FILM COATED ORAL DAILY
Qty: 90 TABLET | Refills: 3 | Status: SHIPPED | OUTPATIENT
Start: 2025-08-14

## 2025-08-18 ENCOUNTER — HOSPITAL ENCOUNTER (OUTPATIENT)
Dept: OCCUPATIONAL THERAPY | Age: 75
Setting detail: THERAPIES SERIES
Discharge: HOME | End: 2025-08-18
Payer: MEDICARE

## 2025-08-18 PROCEDURE — 97530 THERAPEUTIC ACTIVITIES: CPT

## 2025-08-18 PROCEDURE — 97022 WHIRLPOOL THERAPY: CPT

## 2025-08-21 ENCOUNTER — HOSPITAL ENCOUNTER (OUTPATIENT)
Dept: OCCUPATIONAL THERAPY | Age: 75
Setting detail: THERAPIES SERIES
Discharge: HOME | End: 2025-08-21
Payer: MEDICARE

## 2025-08-21 PROCEDURE — 97110 THERAPEUTIC EXERCISES: CPT

## 2025-08-21 PROCEDURE — 97530 THERAPEUTIC ACTIVITIES: CPT

## 2025-08-26 ENCOUNTER — HOSPITAL ENCOUNTER (OUTPATIENT)
Dept: OCCUPATIONAL THERAPY | Age: 75
Setting detail: THERAPIES SERIES
Discharge: HOME | End: 2025-08-26
Payer: MEDICARE

## 2025-08-26 PROCEDURE — 97112 NEUROMUSCULAR REEDUCATION: CPT

## 2025-08-28 ENCOUNTER — HOSPITAL ENCOUNTER (OUTPATIENT)
Dept: OCCUPATIONAL THERAPY | Age: 75
Setting detail: THERAPIES SERIES
Discharge: HOME | End: 2025-08-28
Payer: MEDICARE

## 2025-08-28 PROCEDURE — 97530 THERAPEUTIC ACTIVITIES: CPT

## 2025-09-02 ENCOUNTER — HOSPITAL ENCOUNTER (OUTPATIENT)
Dept: OCCUPATIONAL THERAPY | Age: 75
Setting detail: THERAPIES SERIES
Discharge: HOME | End: 2025-09-02
Payer: MEDICARE

## 2025-09-02 PROCEDURE — 97535 SELF CARE MNGMENT TRAINING: CPT

## 2025-09-02 PROCEDURE — 97112 NEUROMUSCULAR REEDUCATION: CPT

## 2025-09-04 ENCOUNTER — HOSPITAL ENCOUNTER (OUTPATIENT)
Dept: OTHER | Age: 75
Discharge: HOME | End: 2025-09-04
Payer: MEDICARE

## 2025-09-04 ENCOUNTER — HOSPITAL ENCOUNTER (OUTPATIENT)
Dept: OCCUPATIONAL THERAPY | Age: 75
Setting detail: THERAPIES SERIES
Discharge: HOME | End: 2025-09-04
Payer: MEDICARE

## 2025-09-04 DIAGNOSIS — N28.9 RENAL INSUFFICIENCY: ICD-10-CM

## 2025-09-04 DIAGNOSIS — I10 PRIMARY HYPERTENSION: ICD-10-CM

## 2025-09-04 LAB
ALBUMIN SERPL BCG-MCNC: 3.9 G/DL (ref 3.4–4.9)
ALP SERPL-CCNC: 65 U/L (ref 38–126)
ALT SERPL W/O P-5'-P-CCNC: 20 U/L (ref 10–35)
ANION GAP SERPL CALC-SCNC: 11 MEQ/L (ref 8–16)
AST SERPL-CCNC: 23 U/L (ref 10–35)
BASOPHILS ABSOLUTE: 0 THOU/MM3 (ref 0–0.1)
BASOPHILS NFR BLD AUTO: 0.6 %
BILIRUB SERPL-MCNC: 1.3 MG/DL (ref 0.3–1.2)
BUN SERPL-MCNC: 18 MG/DL (ref 8–23)
CALCIUM SERPL-MCNC: 10.6 MG/DL (ref 8.5–10.5)
CHLORIDE SERPL-SCNC: 107 MEQ/L (ref 98–111)
CO2 SERPL-SCNC: 24 MEQ/L (ref 22–29)
CREAT SERPL-MCNC: 0.8 MG/DL (ref 0.5–0.9)
DEPRECATED RDW RBC AUTO: 42.8 FL (ref 35–45)
EOSINOPHIL NFR BLD AUTO: 1.6 %
EOSINOPHILS ABSOLUTE: 0.1 THOU/MM3 (ref 0–0.4)
ERYTHROCYTE [DISTWIDTH] IN BLOOD BY AUTOMATED COUNT: 13 % (ref 11.5–14.5)
GFR SERPL CREATININE-BSD FRML MDRD: 76 ML/MIN/1.73M2
GLUCOSE SERPL-MCNC: 105 MG/DL (ref 74–109)
HCT VFR BLD AUTO: 46.3 % (ref 37–47)
HGB BLD-MCNC: 15.9 GM/DL (ref 12–16)
IMM GRANULOCYTES # BLD AUTO: 0.03 THOU/MM3 (ref 0–0.07)
IMM GRANULOCYTES NFR BLD AUTO: 0.6 %
LYMPHOCYTES ABSOLUTE: 1.3 THOU/MM3 (ref 1–4.8)
LYMPHOCYTES NFR BLD AUTO: 26.7 %
MCH RBC QN AUTO: 31.4 PG (ref 26–33)
MCHC RBC AUTO-ENTMCNC: 34.3 GM/DL (ref 32.2–35.5)
MCV RBC AUTO: 91.5 FL (ref 81–99)
MONOCYTES ABSOLUTE: 0.4 THOU/MM3 (ref 0.4–1.3)
MONOCYTES NFR BLD AUTO: 8.8 %
NEUTROPHILS ABSOLUTE: 3 THOU/MM3 (ref 1.8–7.7)
NEUTROPHILS NFR BLD AUTO: 61.7 %
NRBC BLD AUTO-RTO: 0 /100 WBC
PLATELET # BLD AUTO: 183 THOU/MM3 (ref 130–400)
PMV BLD AUTO: 9.8 FL (ref 9.4–12.4)
POTASSIUM SERPL-SCNC: 4.2 MEQ/L (ref 3.5–5.2)
PROT SERPL-MCNC: 6.4 G/DL (ref 6.4–8.3)
RBC # BLD AUTO: 5.06 MILL/MM3 (ref 4.2–5.4)
SODIUM SERPL-SCNC: 142 MEQ/L (ref 135–145)
WBC # BLD AUTO: 4.9 THOU/MM3 (ref 4.8–10.8)

## 2025-09-04 PROCEDURE — 97530 THERAPEUTIC ACTIVITIES: CPT

## 2025-09-04 PROCEDURE — 80053 COMPREHEN METABOLIC PANEL: CPT

## 2025-09-04 PROCEDURE — 36415 COLL VENOUS BLD VENIPUNCTURE: CPT

## 2025-09-04 PROCEDURE — 85025 COMPLETE CBC W/AUTO DIFF WBC: CPT

## (undated) DEVICE — SINGLE ACTION PUMPING SYSTEM

## (undated) DEVICE — NITINOL STONE RETRIEVAL BASKET: Brand: ZERO TIP

## (undated) DEVICE — GUIDEWIRE URO L150CM DIA .035IN STIFF NIT HYDRPHL STR TIP

## (undated) DEVICE — CYSTO: Brand: MEDLINE INDUSTRIES, INC.

## (undated) DEVICE — ADAPTER URO SCP UROLOK LL

## (undated) DEVICE — MASTISOL ADHESIVE LIQ 2/3ML

## (undated) DEVICE — STRIP,CLOSURE,WOUND,MEDI-STRIP,1/2X4: Brand: MEDLINE

## (undated) DEVICE — DUAL LUMEN URETERAL CATHETER